# Patient Record
Sex: MALE | Race: WHITE | NOT HISPANIC OR LATINO | Employment: OTHER | URBAN - METROPOLITAN AREA
[De-identification: names, ages, dates, MRNs, and addresses within clinical notes are randomized per-mention and may not be internally consistent; named-entity substitution may affect disease eponyms.]

---

## 2017-03-29 ENCOUNTER — ALLSCRIPTS OFFICE VISIT (OUTPATIENT)
Dept: OTHER | Facility: OTHER | Age: 67
End: 2017-03-29

## 2017-03-29 DIAGNOSIS — E66.9 OBESITY: ICD-10-CM

## 2017-03-29 DIAGNOSIS — Z11.1 ENCOUNTER FOR SCREENING FOR RESPIRATORY TUBERCULOSIS: ICD-10-CM

## 2017-03-29 DIAGNOSIS — N52.9 MALE ERECTILE DYSFUNCTION: ICD-10-CM

## 2017-03-29 DIAGNOSIS — Z78.9 OTHER SPECIFIED HEALTH STATUS: ICD-10-CM

## 2017-03-29 DIAGNOSIS — R73.01 IMPAIRED FASTING GLUCOSE: ICD-10-CM

## 2017-03-29 DIAGNOSIS — Z13.29 ENCOUNTER FOR SCREENING FOR OTHER SUSPECTED ENDOCRINE DISORDER: ICD-10-CM

## 2017-04-03 ENCOUNTER — HOSPITAL ENCOUNTER (OUTPATIENT)
Dept: RADIOLOGY | Facility: HOSPITAL | Age: 67
Discharge: HOME | End: 2017-04-03
Payer: COMMERCIAL

## 2017-04-03 ENCOUNTER — TRANSCRIBE ORDERS (OUTPATIENT)
Dept: RADIOLOGY | Facility: HOSPITAL | Age: 67
End: 2017-04-03

## 2017-04-03 DIAGNOSIS — Z11.1 ENCOUNTER FOR SCREENING FOR RESPIRATORY TUBERCULOSIS: ICD-10-CM

## 2017-04-03 PROCEDURE — 71020 HB CHEST X-RAY 2VW FRONTAL&LATL: CPT

## 2017-04-04 ENCOUNTER — GENERIC CONVERSION - ENCOUNTER (OUTPATIENT)
Dept: OTHER | Facility: OTHER | Age: 67
End: 2017-04-04

## 2018-01-13 NOTE — PROGRESS NOTES
Assessment    1  Screening for tuberculosis (V74 1) (Z11 1)   2  Screening for hypothyroidism (V77 0) (Z13 29)   3  Hepatitis B vaccination status unknown (V49 89) (Z78 9)   4  Measles, mumps, rubella (MMR) vaccination status unknown (V49 89) (Z78 9)   5  Obesity (278 00) (E66 9)   6  Impacted cerumen of both ears (380 4) (H61 23)   7  Encounter for preventive health examination (V70 0) (Z00 00)    Plan  ED (erectile dysfunction), Obesity    · (1) LIPID PANEL FASTING W DIRECT LDL REFLEX; Status:Active; Requested  for:29Mar2017;   Hepatitis B vaccination status unknown    · (Q) HEPATITIS B SURFACE ANTIBODY QL; Status:Active; Requested for:29Mar2017;   Impacted cerumen of both ears    · Removal Impacted Cerumen Using Irrigation / Lavage one or both ears - POC;  Status:Complete;   Done: [id number]  Impaired fasting glucose    · (1) HEMOGLOBIN A1C; Status:Active; Requested for:29Mar2017;   Impaired fasting glucose, Obesity    · (1) CBC/PLT/DIFF; Status:Active; Requested for:29Mar2017;    · (1) COMPREHENSIVE METABOLIC PANEL; Status:Active; Requested for:29Mar2017;   Measles, mumps, rubella (MMR) vaccination status unknown    · (1) MUMPS  ANTIBODIES, IGG; Status:Active; Requested for:29Mar2017;    · (1) RUBELLA IMMUNITY; Status:Active; Requested for:29Mar2017;    · (Q) MEASLES ANTIBODY (IGG); Status:Active; Requested for:29Mar2017;   Obesity, Screening for hypothyroidism    · (1) TSH WITH FT4 REFLEX; Status:Active; Requested for:29Mar2017;   Screening for tuberculosis    · * XR CHEST PA & LATERAL; Status:Active; Requested for:29Mar2017;     Discussion/Summary  Impression: health maintenance visit  Currently, he eats a healthy diet and has an adequate exercise regimen  Prostate cancer screening: the risks and benefits of prostate cancer screening were discussed and PSA was ordered   Testicular cancer screening: the risks and benefits of testicular cancer screening were discussed, self testicular exam technique was taught and monthly self testicular exam was advised  Colorectal cancer screening: the risks and benefits of colorectal cancer screening were discussed, colorectal cancer screening is current and colorectal cancer screening is managed by Dr Adina Longo  Screening lab work includes glucose and lipid profile  The risks and benefits of immunizations were discussed and Declines flu vaccine  He was advised to be evaluated by an ophthalmologist  Advice and education were given regarding nutrition, aerobic exercise, weight bearing exercise, weight loss, sunscreen use, self skin examination and seat belt use  Patient discussion: discussed with the patient  Work physical forms partially completed today  B/L ear lavage performed in office today  No Qtips in inner ear canals  Advised to follow up with ophthalmologist regarding [facility] results today  Will get titers for Hepatitis B and MMR as we do no have vaccination records  Will get CXR to screen for TB as he reports a previous allergy to the TB skin antigen  Blood work (including PSA) as ordered  Declines the flu, Zostavax and Prevnar vaccines  UTD with colonoscopy (had 2/2014 with Dr Adina Longo, due 5 years from that time)  Possible side effects of new medications were reviewed with the patient/guardian today  The treatment plan was reviewed with the patient/guardian  The patient/guardian understands and agrees with the treatment plan      Chief Complaint  Patient here for employment physical       History of Present Illness  HM, Adult Male: The patient is being seen for a health maintenance evaluation  The last health maintenance visit was 2015  Social History: Household members include spouse  Work status: working part-time and occupation: [employer]   The patient has never smoked cigarettes  He reports never drinking alcohol  He has never used illicit drugs  General Health: The patient's health since the last visit is described as good  He has regular dental visits  The patient reports his last dental visit was <6 months  He denies vision problems  Vision care includes wearing reading glasses  He denies hearing loss  Immunizations status:  Declines flu vaccine  Lifestyle:  He consumes a diverse and healthy diet  He does not have any weight concerns  He exercises regularly  He exercises 7 times per week  He does not use tobacco  He denies alcohol use  He denies drug use  Reproductive health:  the patient is sexually active  birth control is not being practiced  He denies erectile dysfunction  Screening: Prostate cancer screening includes last prostate-specific antigen testing 2014  Testicular cancer screening includes monthly self testicular examinations  Colorectal cancer screening includes no previous screening and last colonoscopy done 2014  Metabolic screening includes lipid profile performed 2014, glucose screening performed 2014 and thyroid function test performed 2014  Cardiovascular risk factors: obesity and family history of cardiovascular disease, but no hypertension, no diabetes, no high LDL cholesterol, no low HDL cholesterol, no stress, no tobacco use, no illicit drug use and no sedentary lifestyle  General health risks: no elevated prostate-specific antigen, no undescended testis and no family history of prostate cancer  Safety elements used: seat belt, sunscreen and smoke detector  HPI: Pt presents by himself today for a work physical      No acute complaints today, feels well  Reports he has had an allergic reaction to the TB skin antigen in the past- will need the CXR  Blood work last performed in 2014  Exercises most days of the week  Review of Systems    Constitutional: no fever, not feeling poorly, no chills and not feeling tired  Eyes: no eyesight problems  ENT: no sore throat and no nasal discharge     Cardiovascular: no chest pain, no intermittent leg claudication, no palpitations and no extremity edema  Respiratory: no shortness of breath, no cough, no wheezing and no shortness of breath during exertion  Gastrointestinal: no abdominal pain, no nausea, no constipation, no diarrhea and no blood in stools  Genitourinary: no dysuria    The patient presents with complaints of nocturia (once/ night on some nights )  Musculoskeletal: no arthralgias and no myalgias  Integumentary: no rashes and no skin wound  Neurological: no headache, no numbness, no tingling and no dizziness  Psychiatric: no anxiety and no depression  Endocrine: erectile dysfunction, but no feelings of weakness  Hematologic/Lymphatic: no swollen glands  ROS reviewed  Active Problems    1  ED (erectile dysfunction) (607 84) (N52 9)   2  Glaucoma (365 9) (H40 9)   3  Impacted cerumen of both ears (380 4) (H61 23)   4  Impaired fasting glucose (790 21) (R73 01)   5  Obesity (278 00) (E66 9)   6  Special screening examination for neoplasm of prostate (V76 44) (Z12 5)    Past Medical History    · History of Asthma (493 90) (J45 909)   · History of Colon, diverticulosis (562 10) (K57 30)   · History of low back pain (V13 59) (Z87 39)   · History of Nonspecific reaction to 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 active tuberculosis (795 51)  (R76 11)   · History of Pneumonia (V12 61)   · History of Skin rash (782 1) (R21)    Surgical History    · History of Complete Colonoscopy   · History of Tonsillectomy    Family History  Mother    · Family history of Diabetes Mellitus (V18 0)   · Family history of coronary artery disease (V17 3) (Z80 55)  Father    · Family history of coronary artery disease (V17 3) (Z82 49)   · Family history of Stroke Syndrome (V17 1)    Social History    · Never A Smoker   · Never Drank Alcohol    Current Meds   1  Viagra 100 MG Oral Tablet; TAKE 1 TABLET 1 HOUR BEFORE ACTIVITY DAILY AS   NEEDED; Therapy: 12DDR1880 to (Evaluate:27Mar2017)  Requested for: 50CJQ4317; Last   Rx:26Jan2017 Ordered   2  Viagra 100 MG Oral Tablet; take 1 tablet 1 hour before activity daily as needed; Therapy: 21LWK8542 to (Evaluate:51Fyz0859)  Requested for: 27CBQ8286; Last   Rx:23Mar2016 Ordered    Allergies    1  No Known Drug Allergies    Vitals   Recorded: 21MUH8080 08:39AM   Temperature 98 1 F   Heart Rate 72   Respiration 16   Systolic 269   Diastolic 86   Height 5 ft 5 in   Weight 201 lb 0 80 oz   BMI Calculated 33 46   BSA Calculated 1 99   Pain Scale 0     Physical Exam    Constitutional   General appearance: No acute distress, well appearing and well nourished  Head and Face   Head and face: Normal     Palpation of the face and sinuses: No sinus tenderness  Eyes   Conjunctiva and lids: No erythema, swelling or discharge  Pupils and irises: Equal, round, reactive to light  Ophthalmoscopic examination: Normal fundi and optic discs  Ears, Nose, Mouth, and Throat   External inspection of ears and nose: Normal     Otoscopic examination: Abnormal   The right tympanic membrane was obscured completely by cerumen  The left tympanic membrane was obscured completely by cerumen  Hearing: Normal     Nasal mucosa, septum, and turbinates: Normal without edema or erythema  Lips, teeth, and gums: Normal, good dentition  Oropharynx: Normal with no erythema, edema, exudate or lesions  Neck   Neck: Supple, symmetric, trachea midline, no masses  Thyroid: Normal, no thyromegaly  Pulmonary   Respiratory effort: No increased work of breathing or signs of respiratory distress  Auscultation of lungs: Clear to auscultation  Cardiovascular   Auscultation of heart: Normal rate and rhythm, normal S1 and S2, no murmurs  Carotid pulses: 2+ bilaterally  Abdominal aorta: Normal     Examination of extremities for edema and/or varicosities: Normal     Abdomen   Abdomen: Non-tender, no masses  Liver and spleen: No hepatomegaly or splenomegaly  Examination for hernias: No hernias appreciated      Lymphatic Palpation of lymph nodes in neck: No lymphadenopathy  Musculoskeletal   Gait and station: Normal     Inspection/palpation of digits and nails: Normal without clubbing or cyanosis  Inspection/palpation of joints, bones, and muscles: Normal     Skin   Skin and subcutaneous tissue: Normal without rashes or lesions  Neurologic   Cranial nerves: Cranial nerves 2-12 intact  Psychiatric   Judgment and insight: Normal     Orientation to person, place and time: Normal     Mood and affect: Normal        Procedure    Procedure: cerumen removal    Indication: tympanic membrane(s) could not be visualized and cerumen impaction in both ears  Prep: hydrogen peroxide was placed in the canal prior to the procedure  Procedure Note: The procedure was performed by Maria Fernanda Dutta CMA  A otoscope was placed in the ear canal(s) to visualize the ear canal debris  The ear was cleaned by using warm water irrigation  The procedure was successful  Post-Procedure:   Patient Status: the patient tolerated the procedure well  Complications: there were no complications  Patient instructions: dry ear precautions and avoid using q-tips  Follow-up as needed  Procedure: Indication: routine screening  Inforrmation supplied by Maria Fernanda Dutta CMA a Snellen chart  Results: 20/40 in both eyes without corrective device, 20/50 in the right eye without corrective device, 20/40 in the left eye without corrective device      Attending Note  Collaborating Physician Note: Collaborating Physician: I did not interview and examine the patient and I agree with the Advanced Practitioner note  Signatures   Electronically signed by : Zhane Lacy; Mar 29 2017  9:22AM EST                       (Author)    Electronically signed by :  Jerica Gonzales MD; Mar 29 2017 12:18PM EST                       (Author)

## 2018-01-14 VITALS
DIASTOLIC BLOOD PRESSURE: 86 MMHG | BODY MASS INDEX: 33.49 KG/M2 | WEIGHT: 201.05 LBS | TEMPERATURE: 98.1 F | HEART RATE: 72 BPM | HEIGHT: 65 IN | SYSTOLIC BLOOD PRESSURE: 126 MMHG | RESPIRATION RATE: 16 BRPM

## 2018-01-14 NOTE — RESULT NOTES
Message   CXR normal   If he needs a copy for his work physical forms, we can mail a copy to him or he can pick it up  Verified Results  * XR CHEST PA & LATERAL [id number] 08:28AM Travis Miguel Order Number: [id number]     Test Name Result Flag Reference   XR CHEST PA & LATERAL (Report)     CHEST - DUAL ENERGY     INDICATION: 61-year-old male, positive PPD   COMPARISON: 7/18/2011 chest x-ray     VIEWS: PA (including soft tissue/bone algorithms) and lateral projections; 4 images     FINDINGS:     The lungs are clear  No pleural effusions  The cardiomediastinal silhouette is unremarkable  Bony thorax is unremarkable       Findings are stable       IMPRESSION:     No acute cardiopulmonary disease, stable        Workstation performed: [id number]     Signed by:   Aimee Saldaña MD   4/4/17

## 2018-04-23 DIAGNOSIS — N52.9 ERECTILE DYSFUNCTION, UNSPECIFIED ERECTILE DYSFUNCTION TYPE: Primary | ICD-10-CM

## 2018-04-23 RX ORDER — SILDENAFIL 100 MG/1
100 TABLET, FILM COATED ORAL AS NEEDED
Qty: 10 TABLET | Refills: 5 | Status: SHIPPED | OUTPATIENT
Start: 2018-04-23 | End: 2022-03-08 | Stop reason: SDUPTHER

## 2018-04-23 RX ORDER — SILDENAFIL 100 MG/1
1 TABLET, FILM COATED ORAL DAILY PRN
COMMUNITY
Start: 2014-07-09 | End: 2018-04-23 | Stop reason: SDUPTHER

## 2018-04-23 NOTE — TELEPHONE ENCOUNTER
Ade Pan,   I have not seen this patient for the last few years  You saw him on 2017  Please check if you can refill Rx for Viagra that patient requested  Thank you

## 2018-07-03 ENCOUNTER — OFFICE VISIT (OUTPATIENT)
Dept: FAMILY MEDICINE CLINIC | Facility: CLINIC | Age: 68
End: 2018-07-03
Payer: COMMERCIAL

## 2018-07-03 VITALS
HEART RATE: 64 BPM | BODY MASS INDEX: 36.06 KG/M2 | RESPIRATION RATE: 16 BRPM | DIASTOLIC BLOOD PRESSURE: 88 MMHG | WEIGHT: 216.4 LBS | SYSTOLIC BLOOD PRESSURE: 136 MMHG | TEMPERATURE: 97.4 F | HEIGHT: 65 IN

## 2018-07-03 DIAGNOSIS — Z00.00 WELL ADULT EXAM: Primary | ICD-10-CM

## 2018-07-03 DIAGNOSIS — Z23 NEED FOR PNEUMOCOCCAL VACCINATION: ICD-10-CM

## 2018-07-03 DIAGNOSIS — R73.01 IMPAIRED FASTING GLUCOSE: ICD-10-CM

## 2018-07-03 DIAGNOSIS — R01.0 MURMUR, FUNCTIONAL: ICD-10-CM

## 2018-07-03 DIAGNOSIS — E66.09 CLASS 2 OBESITY DUE TO EXCESS CALORIES WITHOUT SERIOUS COMORBIDITY WITH BODY MASS INDEX (BMI) OF 36.0 TO 36.9 IN ADULT: ICD-10-CM

## 2018-07-03 DIAGNOSIS — Z12.5 SCREENING FOR PROSTATE CANCER: ICD-10-CM

## 2018-07-03 DIAGNOSIS — E78.2 MIXED HYPERLIPIDEMIA: ICD-10-CM

## 2018-07-03 DIAGNOSIS — Z23 NEED FOR TDAP VACCINATION: ICD-10-CM

## 2018-07-03 PROCEDURE — 99397 PER PM REEVAL EST PAT 65+ YR: CPT | Performed by: NURSE PRACTITIONER

## 2018-07-03 PROCEDURE — 90471 IMMUNIZATION ADMIN: CPT

## 2018-07-03 PROCEDURE — 90715 TDAP VACCINE 7 YRS/> IM: CPT

## 2018-07-03 PROCEDURE — 90472 IMMUNIZATION ADMIN EACH ADD: CPT

## 2018-07-03 PROCEDURE — 90670 PCV13 VACCINE IM: CPT

## 2018-07-03 NOTE — PATIENT INSTRUCTIONS
Schedule Echo of your heart  Have labs done now  We will call you with these results  Work on regular exercise, follow a healthy diet  Colonoscopy due for repeat 2/2019 with Dr Nikki Mendoza

## 2018-07-03 NOTE — PROGRESS NOTES
Chief Complaint   Patient presents with    Physical Exam     Preventative physical exam      Health Maintenance   Topic Date Due    Hepatitis C Screening  1950    CRC Screening: Colonoscopy  1950    Fall Risk  03/14/2017    PNEUMOCOCCAL POLYSACCHARIDE VACCINE AGE 65 AND OVER  03/14/2017    DTaP,Tdap,and Td Vaccines (2 - Td) 01/29/2018    INFLUENZA VACCINE  06/01/2018    Depression Screening PHQ-9  07/03/2019     Assessment/Plan:     Diagnoses and all orders for this visit:    Well adult exam    Impaired fasting glucose  -     CBC and differential; Future  -     Comprehensive metabolic panel; Future  -     Hemoglobin A1C; Future  -     TSH, 3rd generation with Free T4 reflex; Future    Class 2 obesity due to excess calories without serious comorbidity with body mass index (BMI) of 36 0 to 36 9 in adult  -     CBC and differential; Future  -     Comprehensive metabolic panel; Future  -     Hemoglobin A1C; Future  -     Lipid panel; Future  -     TSH, 3rd generation with Free T4 reflex; Future    Mixed hyperlipidemia  -     CBC and differential; Future  -     Comprehensive metabolic panel; Future  -     Lipid panel; Future  -     TSH, 3rd generation with Free T4 reflex; Future    Murmur, functional  -     Echo complete with contrast if indicated; Future  -     CBC and differential; Future  -     Comprehensive metabolic panel; Future  -     TSH, 3rd generation with Free T4 reflex; Future    Need for Tdap vaccination  -     TDAP VACCINE GREATER THAN OR EQUAL TO 8YO IM    Need for pneumococcal vaccination  -     PNEUMOCOCCAL CONJUGATE VACCINE 13-VALENT GREATER THAN 6 MONTHS    Screening for prostate cancer  -     PSA, total and free; Future          Subjective:      Patient ID: Brenda Fam is a 79 y o  male  Assessment/Plan    Healthy male exam     1  HM- PSA and routine labs ordered today  Colonoscopy due for repeat 2/2019 with Dr Kalyan Larson and Prevnar administered today    He will check with his insurance regarding the coverage of Shingrix    2  Systolic murmur- new finding  He is asymptomatic but given this is new, we'll check an echo to r/o valvular abnormalities     3  IFG/ Obesity- work on regular exercise 30 minutes 5 x per week, work on weight loss    4  Hyperlipidemia- lipid panel ordered  Follow a low fat/ low cholesterol diet, work on weight loss     5  Patient Counseling:  --Nutrition: Stressed importance of moderation in sodium/caffeine intake, saturated fat and cholesterol, caloric balance, sufficient intake of fresh fruits, vegetables, fiber, calcium, iron, and 1 mg of folate supplement per day (for females capable of pregnancy)  --Discussed the issue of estrogen replacement, calcium supplement, and the daily use of baby aspirin  --Exercise: Stressed the importance of regular exercise  --Substance Abuse: Discussed cessation/primary prevention of tobacco, alcohol, or other drug use; driving or other dangerous activities under the influence; availability of treatment for abuse  --Sexuality: Discussed sexually transmitted diseases, partner selection, use of condoms, avoidance of unintended pregnancy  and contraceptive alternatives  --Injury prevention: Discussed safety belts, safety helmets, smoke detector, smoking near bedding or upholstery  --Dental health: Discussed importance of regular tooth brushing, flossing, and dental visits  --Immunizations reviewed  --Discussed benefits of screening colonoscopy  --After hours service discussed with patient    6  Discussed the patient's BMI with him  The BMI is above average; BMI management plan is completed    7  Follow up in 6 month  Subjective    Leonor Arreola is a 79 y o  male and is here for a comprehensive physical exam  The patient reports no problems    No lab work since 7/2014  PSA at that time 1 9  Lipid panel with / / TG 34/ HDL 55  IFG-     He recently took on more responsibilities are work and started some other side jobs as well- hasn't been exercising as much as he'd like but has been working on this     Colonoscopy done 2/2014 with Dr Demetria Lutz, due for repeat 2/2019    Do you take any herbs or supplements that were not prescribed by a doctor? no  Are you taking calcium supplements? no  Are you taking aspirin daily? No    The following portions of the patient's history were reviewed and updated as appropriate: allergies, current medications, past medical history, past social history and problem list     Review of Systems  Do you have pain that bothers you in your daily life? no  Constitutional: negative  Eyes: negative  Ears, nose, mouth, throat, and face: negative  Respiratory: negative  Cardiovascular: negative  Gastrointestinal: negative  Genitourinary:negative  Hematologic/lymphatic: negative  Musculoskeletal:negative  Neurological: negative  Behavioral/Psych: negative  Endocrine: negative  Objective    General appearance: alert and oriented, in no acute distress  Head: Normocephalic, without obvious abnormality, atraumatic  Eyes: conjunctivae/corneas clear  PERRL, EOM's intact  Fundi benign  Ears: normal TM's and external ear canals both ears  Nose: Nares normal  Septum midline  Mucosa normal  No drainage or sinus tenderness  Throat: lips, mucosa, and tongue normal; teeth and gums normal  Neck: no adenopathy, no carotid bruit, no JVD, supple, symmetrical, trachea midline and thyroid not enlarged, symmetric, no tenderness/mass/nodules  Back: symmetric, no curvature  ROM normal  No CVA tenderness    Lungs: clear to auscultation bilaterally  Heart: regular rate and rhythm, S1, S2 normal and systolic INMOFV:3/2 LUSB  Abdomen: soft, non-tender; bowel sounds normal; no masses,  no organomegaly  Extremities: extremities normal, warm and well-perfused; no cyanosis, clubbing, or edema  Skin: Skin color, texture, turgor normal  No rashes or lesions  Lymph nodes: Cervical, supraclavicular, and axillary nodes normal   Neurologic: Grossly normal             The following portions of the patient's history were reviewed and updated as appropriate: allergies, current medications, past medical history, past social history and problem list     Review of Systems  see above    Objective:    /88 (BP Location: Left arm, Patient Position: Sitting)   Pulse 64   Temp (!) 97 4 °F (36 3 °C) (Tympanic)   Resp 16   Ht 5' 5" (1 651 m)   Wt 98 2 kg (216 lb 6 4 oz)   BMI 36 01 kg/m²      Physical Exam  see above

## 2018-07-06 ENCOUNTER — LAB (OUTPATIENT)
Dept: LAB | Facility: HOSPITAL | Age: 68
End: 2018-07-06
Payer: COMMERCIAL

## 2018-07-06 DIAGNOSIS — R73.01 IMPAIRED FASTING GLUCOSE: ICD-10-CM

## 2018-07-06 DIAGNOSIS — Z12.5 SCREENING FOR PROSTATE CANCER: ICD-10-CM

## 2018-07-06 DIAGNOSIS — R01.0 MURMUR, FUNCTIONAL: ICD-10-CM

## 2018-07-06 DIAGNOSIS — E66.09 CLASS 2 OBESITY DUE TO EXCESS CALORIES WITHOUT SERIOUS COMORBIDITY WITH BODY MASS INDEX (BMI) OF 36.0 TO 36.9 IN ADULT: ICD-10-CM

## 2018-07-06 DIAGNOSIS — E78.2 MIXED HYPERLIPIDEMIA: ICD-10-CM

## 2018-07-06 LAB
ALBUMIN SERPL BCP-MCNC: 3.5 G/DL (ref 3.5–5)
ALP SERPL-CCNC: 70 U/L (ref 46–116)
ALT SERPL W P-5'-P-CCNC: 26 U/L (ref 12–78)
ANION GAP SERPL CALCULATED.3IONS-SCNC: 6 MMOL/L (ref 4–13)
AST SERPL W P-5'-P-CCNC: 17 U/L (ref 5–45)
BASOPHILS # BLD AUTO: 0.04 THOUSANDS/ΜL (ref 0–0.1)
BASOPHILS NFR BLD AUTO: 1 % (ref 0–1)
BILIRUB SERPL-MCNC: 0.47 MG/DL (ref 0.2–1)
BUN SERPL-MCNC: 22 MG/DL (ref 5–25)
CALCIUM SERPL-MCNC: 8.7 MG/DL (ref 8.3–10.1)
CHLORIDE SERPL-SCNC: 108 MMOL/L (ref 100–108)
CHOLEST SERPL-MCNC: 164 MG/DL (ref 50–200)
CO2 SERPL-SCNC: 24 MMOL/L (ref 21–32)
CREAT SERPL-MCNC: 1.23 MG/DL (ref 0.6–1.3)
EOSINOPHIL # BLD AUTO: 0.22 THOUSAND/ΜL (ref 0–0.61)
EOSINOPHIL NFR BLD AUTO: 3 % (ref 0–6)
ERYTHROCYTE [DISTWIDTH] IN BLOOD BY AUTOMATED COUNT: 12.3 % (ref 11.6–15.1)
EST. AVERAGE GLUCOSE BLD GHB EST-MCNC: 111 MG/DL
GFR SERPL CREATININE-BSD FRML MDRD: 61 ML/MIN/1.73SQ M
GLUCOSE P FAST SERPL-MCNC: 111 MG/DL (ref 65–99)
HBA1C MFR BLD: 5.5 % (ref 4.2–6.3)
HCT VFR BLD AUTO: 44.4 % (ref 36.5–49.3)
HDLC SERPL-MCNC: 44 MG/DL (ref 40–60)
HGB BLD-MCNC: 14.9 G/DL (ref 12–17)
IMM GRANULOCYTES # BLD AUTO: 0.01 THOUSAND/UL (ref 0–0.2)
IMM GRANULOCYTES NFR BLD AUTO: 0 % (ref 0–2)
LDLC SERPL CALC-MCNC: 110 MG/DL (ref 0–100)
LYMPHOCYTES # BLD AUTO: 2.06 THOUSANDS/ΜL (ref 0.6–4.47)
LYMPHOCYTES NFR BLD AUTO: 31 % (ref 14–44)
MCH RBC QN AUTO: 32.2 PG (ref 26.8–34.3)
MCHC RBC AUTO-ENTMCNC: 33.6 G/DL (ref 31.4–37.4)
MCV RBC AUTO: 96 FL (ref 82–98)
MONOCYTES # BLD AUTO: 0.67 THOUSAND/ΜL (ref 0.17–1.22)
MONOCYTES NFR BLD AUTO: 10 % (ref 4–12)
NEUTROPHILS # BLD AUTO: 3.69 THOUSANDS/ΜL (ref 1.85–7.62)
NEUTS SEG NFR BLD AUTO: 55 % (ref 43–75)
NONHDLC SERPL-MCNC: 120 MG/DL
NRBC BLD AUTO-RTO: 0 /100 WBCS
PLATELET # BLD AUTO: 189 THOUSANDS/UL (ref 149–390)
PMV BLD AUTO: 11 FL (ref 8.9–12.7)
POTASSIUM SERPL-SCNC: 4.3 MMOL/L (ref 3.5–5.3)
PROT SERPL-MCNC: 7 G/DL (ref 6.4–8.2)
RBC # BLD AUTO: 4.63 MILLION/UL (ref 3.88–5.62)
SODIUM SERPL-SCNC: 138 MMOL/L (ref 136–145)
TRIGL SERPL-MCNC: 51 MG/DL
TSH SERPL DL<=0.05 MIU/L-ACNC: 3.63 UIU/ML (ref 0.36–3.74)
WBC # BLD AUTO: 6.69 THOUSAND/UL (ref 4.31–10.16)

## 2018-07-06 PROCEDURE — 84154 ASSAY OF PSA FREE: CPT

## 2018-07-06 PROCEDURE — 80061 LIPID PANEL: CPT

## 2018-07-06 PROCEDURE — 84153 ASSAY OF PSA TOTAL: CPT

## 2018-07-06 PROCEDURE — 83036 HEMOGLOBIN GLYCOSYLATED A1C: CPT

## 2018-07-06 PROCEDURE — 85025 COMPLETE CBC W/AUTO DIFF WBC: CPT

## 2018-07-06 PROCEDURE — 80053 COMPREHEN METABOLIC PANEL: CPT

## 2018-07-06 PROCEDURE — 36415 COLL VENOUS BLD VENIPUNCTURE: CPT

## 2018-07-06 PROCEDURE — 84443 ASSAY THYROID STIM HORMONE: CPT

## 2018-07-07 LAB
PSA FREE MFR SERPL: 25.8 %
PSA FREE SERPL-MCNC: 0.98 NG/ML
PSA SERPL-MCNC: 3.8 NG/ML (ref 0–4)

## 2018-07-09 ENCOUNTER — TELEPHONE (OUTPATIENT)
Dept: FAMILY MEDICINE CLINIC | Facility: CLINIC | Age: 68
End: 2018-07-09

## 2018-07-09 DIAGNOSIS — E66.09 CLASS 2 OBESITY DUE TO EXCESS CALORIES WITHOUT SERIOUS COMORBIDITY WITH BODY MASS INDEX (BMI) OF 36.0 TO 36.9 IN ADULT: ICD-10-CM

## 2018-07-09 DIAGNOSIS — R73.01 IMPAIRED FASTING GLUCOSE: Primary | ICD-10-CM

## 2018-07-09 DIAGNOSIS — R97.20 ELEVATED PSA MEASUREMENT: ICD-10-CM

## 2018-07-09 DIAGNOSIS — E78.2 MIXED HYPERLIPIDEMIA: ICD-10-CM

## 2018-07-09 NOTE — TELEPHONE ENCOUNTER
Patient spoke to his insurance company and they require a letter from his provider stating that it is recommended that he receive a shingles shot to have it covered by insurance  Patient can be contacted at [phone] with any questions

## 2018-07-27 ENCOUNTER — HOSPITAL ENCOUNTER (OUTPATIENT)
Dept: NON INVASIVE DIAGNOSTICS | Facility: HOSPITAL | Age: 68
Discharge: HOME | End: 2018-07-27
Payer: COMMERCIAL

## 2018-07-27 ENCOUNTER — TRANSCRIBE ORDERS (OUTPATIENT)
Dept: ADMISSIONS | Facility: HOSPITAL | Age: 68
End: 2018-07-27

## 2018-07-27 DIAGNOSIS — R01.0 MURMUR, FUNCTIONAL: ICD-10-CM

## 2018-07-27 DIAGNOSIS — I35.0 SEVERE AORTIC STENOSIS: Primary | ICD-10-CM

## 2018-07-27 PROCEDURE — 93306 TTE W/DOPPLER COMPLETE: CPT

## 2018-07-27 PROCEDURE — 93306 TTE W/DOPPLER COMPLETE: CPT | Performed by: INTERNAL MEDICINE

## 2018-08-30 ENCOUNTER — OFFICE VISIT (OUTPATIENT)
Dept: CARDIOLOGY CLINIC | Facility: CLINIC | Age: 68
End: 2018-08-30
Payer: MEDICARE

## 2018-08-30 VITALS
DIASTOLIC BLOOD PRESSURE: 78 MMHG | HEART RATE: 60 BPM | SYSTOLIC BLOOD PRESSURE: 124 MMHG | HEIGHT: 67 IN | BODY MASS INDEX: 33.57 KG/M2 | WEIGHT: 213.9 LBS

## 2018-08-30 DIAGNOSIS — I35.0 SEVERE AORTIC STENOSIS: ICD-10-CM

## 2018-08-30 DIAGNOSIS — E66.09 CLASS 2 OBESITY DUE TO EXCESS CALORIES WITHOUT SERIOUS COMORBIDITY WITH BODY MASS INDEX (BMI) OF 36.0 TO 36.9 IN ADULT: Primary | ICD-10-CM

## 2018-08-30 DIAGNOSIS — E78.2 MIXED HYPERLIPIDEMIA: ICD-10-CM

## 2018-08-30 PROCEDURE — 99204 OFFICE O/P NEW MOD 45 MIN: CPT | Performed by: INTERNAL MEDICINE

## 2018-08-30 PROCEDURE — 93000 ELECTROCARDIOGRAM COMPLETE: CPT | Performed by: INTERNAL MEDICINE

## 2018-08-30 NOTE — PROGRESS NOTES
Cardiology Consultation     To Myrick  [id number]  1950  HEART & VASCULAR [facility] [facility]    I had the pleasure of seeing To Myrick for a consultation regarding aortic stenosis requested by Jemma Gagnon    History of the Presenting Illness, Discussion/Summary and My Plan are as follows:    He is a very pleasant 53-year-old gentleman who presents for evaluation of aortic stenosis by an echocardiogram from July 2018  He does not have a history of hypertension, diabetes or dyslipidemia  LDL is 110  No family history of premature coronary artery disease  His dad had a heart attack at the age of 72  For evaluation of her murmur, underwent a recent echocardiogram that reported ' severe aortic stenosis', I reviewed his images myself and did the measurements also  (peak gradient-66, mean gradient -34, VTI Ix:  28/99= 0 28; LVOT diameter 2 4, valve area -1 2), also has mild aortic insufficiency    He has been feeling more short of breath than his baseline for the last couple of months although this is only sporadic  When he exercises he does not have any symptoms, sometimes when he is playing with his grand kids he feels more short of breath  He also admits to putting on about 20 lb of weight in the last 1 year and attributes this shortness of breath to that  He denies any chest pains, presyncope or syncope  He remains mentally and physically active  Plan: Aortic stenosis:  Along with mild aortic insufficiency, At this point appears in the moderate range, see measurements above, patient is relatively asymptomatic    I discussed with him-the cardinal symptoms to watch out for-CHEST PAIN, SHORTNESS OF BREATH, LIGHTHEADEDNESS OR PASSING OUT AND INCREASING FATIGUE  He will seek attention for any of these symptoms  Recheck an echo in 6 months    Blood pressure is well controlled    Lipid profile shows a total cholesterol 164, triglycerides 51, HDL 44,     Follow-up in 6 months    1  Class 2 obesity due to excess calories without serious comorbidity with body mass index (BMI) of 36 0 to 36 9 in adult     2  Severe aortic stenosis  Ambulatory referral to Cardiology    POCT ECG    Echo complete with contrast if indicated   3  Mixed hyperlipidemia       Patient Active Problem List   Diagnosis    ED (erectile dysfunction)    Impaired fasting glucose    Obesity    Mixed hyperlipidemia    Elevated PSA measurement    Severe aortic stenosis     Past Medical History:   Diagnosis Date    Asthma     Colon, diverticulosis     Nonspecific reaction to 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 active tuberculosis     CXR HAS BEEN CLEAR   Pneumonia      Social History     Social History    Marital status: /Civil Union     Spouse name: N/A    Number of children: N/A    Years of education: N/A     Occupational History    Not on file  Social History Main Topics    Smoking status: Never Smoker    Smokeless tobacco: Never Used    Alcohol use No    Drug use: No    Sexual activity: Not on file     Other Topics Concern    Not on file     Social History Narrative    No narrative on file      Family History   Problem Relation Age of Onset    Diabetes Mother         MELLITUS    Coronary artery disease Mother     Coronary artery disease Father     Stroke Father         SYNDROME     Past Surgical History:   Procedure Laterality Date    COLONOSCOPY  11/2007    COMPLETE; DONE 11/2007 WITH NO POLYPS, +DIVERTICULA   2/14    TONSILLECTOMY      LAST ASSESSED: 7/9/14       Current Outpatient Prescriptions:     sildenafil (VIAGRA) 100 mg tablet, Take 1 tablet (100 mg total) by mouth as needed for erectile dysfunction, Disp: 10 tablet, Rfl: 5  Allergies   Allergen Reactions    Tuberculin Rash     Vitals:    08/30/18 0802   BP: 124/78   BP Location: Left arm   Patient Position: Sitting   Cuff Size: Large   Pulse: 60   Weight: 97 kg (213 lb 14 4 oz)   Height: 5' 7" (1 702 m)       Labs:not applicable    Imaging: No results found  Review of Systems:  Review of Systems   Constitutional: Negative  HENT: Negative  Eyes: Negative  Respiratory: Positive for shortness of breath  Negative for apnea, cough, choking, chest tightness, wheezing and stridor  Cardiovascular: Negative  Gastrointestinal: Negative  Endocrine: Negative  Genitourinary: Negative  Musculoskeletal: Negative  Skin: Negative  Allergic/Immunologic: Negative  Neurological: Negative  Hematological: Negative  Psychiatric/Behavioral: Negative  Physical Exam:  Physical Exam   Constitutional: He appears well-developed and well-nourished  No distress  HENT:   Head: Normocephalic and atraumatic  Eyes: Pupils are equal, round, and reactive to light  Right eye exhibits no discharge  Left eye exhibits no discharge  Neck: Normal range of motion  No JVD present  No tracheal deviation present  No thyromegaly present  Cardiovascular: Normal rate and intact distal pulses  Exam reveals no friction rub  Murmur heard  Pulmonary/Chest: Effort normal  No stridor  No respiratory distress  He has no wheezes  He has no rales  Abdominal: Soft  He exhibits no distension  There is no tenderness  There is no rebound  Musculoskeletal: Normal range of motion  He exhibits no edema or deformity  Skin: Skin is warm  No rash noted  He is not diaphoretic  No erythema  No pallor

## 2019-05-06 ENCOUNTER — TELEPHONE (OUTPATIENT)
Dept: FAMILY MEDICINE CLINIC | Facility: CLINIC | Age: 69
End: 2019-05-06

## 2021-02-13 DIAGNOSIS — Z23 ENCOUNTER FOR IMMUNIZATION: ICD-10-CM

## 2021-10-25 PROBLEM — E78.5 DYSLIPIDEMIA: Status: ACTIVE | Noted: 2018-07-03

## 2021-10-25 PROBLEM — Z00.00 WELCOME TO MEDICARE PREVENTIVE VISIT: Status: ACTIVE | Noted: 2021-10-25

## 2021-10-27 ENCOUNTER — OFFICE VISIT (OUTPATIENT)
Dept: FAMILY MEDICINE CLINIC | Facility: CLINIC | Age: 71
End: 2021-10-27
Payer: MEDICARE

## 2021-10-27 VITALS
HEART RATE: 74 BPM | WEIGHT: 212 LBS | HEIGHT: 66 IN | SYSTOLIC BLOOD PRESSURE: 124 MMHG | BODY MASS INDEX: 34.07 KG/M2 | RESPIRATION RATE: 14 BRPM | TEMPERATURE: 98.4 F | DIASTOLIC BLOOD PRESSURE: 82 MMHG | OXYGEN SATURATION: 98 %

## 2021-10-27 DIAGNOSIS — Z12.5 SCREENING FOR PROSTATE CANCER: ICD-10-CM

## 2021-10-27 DIAGNOSIS — I35.0 SEVERE AORTIC STENOSIS: ICD-10-CM

## 2021-10-27 DIAGNOSIS — Z00.00 WELCOME TO MEDICARE PREVENTIVE VISIT: Primary | ICD-10-CM

## 2021-10-27 DIAGNOSIS — Z12.11 SCREENING FOR COLON CANCER: ICD-10-CM

## 2021-10-27 DIAGNOSIS — E66.09 CLASS 2 OBESITY DUE TO EXCESS CALORIES WITHOUT SERIOUS COMORBIDITY WITH BODY MASS INDEX (BMI) OF 36.0 TO 36.9 IN ADULT: ICD-10-CM

## 2021-10-27 DIAGNOSIS — Z23 NEED FOR PNEUMOCOCCAL VACCINATION: ICD-10-CM

## 2021-10-27 DIAGNOSIS — R73.01 IMPAIRED FASTING GLUCOSE: ICD-10-CM

## 2021-10-27 DIAGNOSIS — E78.5 DYSLIPIDEMIA: ICD-10-CM

## 2021-10-27 PROCEDURE — G0009 ADMIN PNEUMOCOCCAL VACCINE: HCPCS

## 2021-10-27 PROCEDURE — 1123F ACP DISCUSS/DSCN MKR DOCD: CPT | Performed by: FAMILY MEDICINE

## 2021-10-27 PROCEDURE — G0402 INITIAL PREVENTIVE EXAM: HCPCS | Performed by: FAMILY MEDICINE

## 2021-10-27 PROCEDURE — 90732 PPSV23 VACC 2 YRS+ SUBQ/IM: CPT

## 2021-10-27 PROCEDURE — G0403 EKG FOR INITIAL PREVENT EXAM: HCPCS | Performed by: FAMILY MEDICINE

## 2021-10-31 DIAGNOSIS — L30.9 DERMATITIS: Primary | ICD-10-CM

## 2021-10-31 RX ORDER — DESOXIMETASONE 2.5 MG/G
CREAM TOPICAL 2 TIMES DAILY
Qty: 60 G | Refills: 1 | Status: SHIPPED | OUTPATIENT
Start: 2021-10-31

## 2021-11-03 ENCOUNTER — APPOINTMENT (OUTPATIENT)
Dept: LAB | Facility: HOSPITAL | Age: 71
End: 2021-11-03
Payer: MEDICARE

## 2021-11-03 DIAGNOSIS — R73.01 IMPAIRED FASTING GLUCOSE: ICD-10-CM

## 2021-11-03 DIAGNOSIS — Z12.5 SCREENING FOR PROSTATE CANCER: ICD-10-CM

## 2021-11-03 DIAGNOSIS — R97.20 ELEVATED PSA: Primary | ICD-10-CM

## 2021-11-03 DIAGNOSIS — E78.5 DYSLIPIDEMIA: ICD-10-CM

## 2021-11-03 DIAGNOSIS — E66.09 CLASS 2 OBESITY DUE TO EXCESS CALORIES WITHOUT SERIOUS COMORBIDITY WITH BODY MASS INDEX (BMI) OF 36.0 TO 36.9 IN ADULT: ICD-10-CM

## 2021-11-03 DIAGNOSIS — I35.0 SEVERE AORTIC STENOSIS: ICD-10-CM

## 2021-11-03 LAB
ALBUMIN SERPL BCP-MCNC: 3.6 G/DL (ref 3.5–5)
ALP SERPL-CCNC: 78 U/L (ref 46–116)
ALT SERPL W P-5'-P-CCNC: 36 U/L (ref 12–78)
ANION GAP SERPL CALCULATED.3IONS-SCNC: 2 MMOL/L (ref 4–13)
AST SERPL W P-5'-P-CCNC: 22 U/L (ref 5–45)
BASOPHILS # BLD AUTO: 0.05 THOUSANDS/ΜL (ref 0–0.1)
BASOPHILS NFR BLD AUTO: 1 % (ref 0–1)
BILIRUB SERPL-MCNC: 0.61 MG/DL (ref 0.2–1)
BUN SERPL-MCNC: 17 MG/DL (ref 5–25)
CALCIUM SERPL-MCNC: 8.9 MG/DL (ref 8.3–10.1)
CHLORIDE SERPL-SCNC: 106 MMOL/L (ref 100–108)
CHOLEST SERPL-MCNC: 179 MG/DL (ref 50–200)
CO2 SERPL-SCNC: 27 MMOL/L (ref 21–32)
CREAT SERPL-MCNC: 1.15 MG/DL (ref 0.6–1.3)
EOSINOPHIL # BLD AUTO: 0.11 THOUSAND/ΜL (ref 0–0.61)
EOSINOPHIL NFR BLD AUTO: 2 % (ref 0–6)
ERYTHROCYTE [DISTWIDTH] IN BLOOD BY AUTOMATED COUNT: 12.2 % (ref 11.6–15.1)
EST. AVERAGE GLUCOSE BLD GHB EST-MCNC: 105 MG/DL
GFR SERPL CREATININE-BSD FRML MDRD: 65 ML/MIN/1.73SQ M
GLUCOSE P FAST SERPL-MCNC: 133 MG/DL (ref 65–99)
HBA1C MFR BLD: 5.3 %
HCT VFR BLD AUTO: 45 % (ref 36.5–49.3)
HDLC SERPL-MCNC: 50 MG/DL
HGB BLD-MCNC: 15.1 G/DL (ref 12–17)
IMM GRANULOCYTES # BLD AUTO: 0.01 THOUSAND/UL (ref 0–0.2)
IMM GRANULOCYTES NFR BLD AUTO: 0 % (ref 0–2)
LDLC SERPL CALC-MCNC: 114 MG/DL (ref 0–100)
LYMPHOCYTES # BLD AUTO: 2.31 THOUSANDS/ΜL (ref 0.6–4.47)
LYMPHOCYTES NFR BLD AUTO: 36 % (ref 14–44)
MCH RBC QN AUTO: 32.3 PG (ref 26.8–34.3)
MCHC RBC AUTO-ENTMCNC: 33.6 G/DL (ref 31.4–37.4)
MCV RBC AUTO: 96 FL (ref 82–98)
MONOCYTES # BLD AUTO: 0.61 THOUSAND/ΜL (ref 0.17–1.22)
MONOCYTES NFR BLD AUTO: 10 % (ref 4–12)
NEUTROPHILS # BLD AUTO: 3.32 THOUSANDS/ΜL (ref 1.85–7.62)
NEUTS SEG NFR BLD AUTO: 51 % (ref 43–75)
NONHDLC SERPL-MCNC: 129 MG/DL
NRBC BLD AUTO-RTO: 0 /100 WBCS
PLATELET # BLD AUTO: 186 THOUSANDS/UL (ref 149–390)
PMV BLD AUTO: 10.6 FL (ref 8.9–12.7)
POTASSIUM SERPL-SCNC: 4 MMOL/L (ref 3.5–5.3)
PROT SERPL-MCNC: 7.3 G/DL (ref 6.4–8.2)
PSA SERPL-MCNC: 4.7 NG/ML (ref 0–4)
RBC # BLD AUTO: 4.68 MILLION/UL (ref 3.88–5.62)
SODIUM SERPL-SCNC: 135 MMOL/L (ref 136–145)
TRIGL SERPL-MCNC: 77 MG/DL
WBC # BLD AUTO: 6.41 THOUSAND/UL (ref 4.31–10.16)

## 2021-11-03 PROCEDURE — 83036 HEMOGLOBIN GLYCOSYLATED A1C: CPT

## 2021-11-03 PROCEDURE — 85025 COMPLETE CBC W/AUTO DIFF WBC: CPT

## 2021-11-03 PROCEDURE — G0103 PSA SCREENING: HCPCS

## 2021-11-03 PROCEDURE — 36415 COLL VENOUS BLD VENIPUNCTURE: CPT

## 2021-11-03 PROCEDURE — 80061 LIPID PANEL: CPT

## 2021-11-03 PROCEDURE — 80053 COMPREHEN METABOLIC PANEL: CPT

## 2021-11-11 ENCOUNTER — TELEPHONE (OUTPATIENT)
Dept: UROLOGY | Facility: AMBULATORY SURGERY CENTER | Age: 71
End: 2021-11-11

## 2021-12-07 ENCOUNTER — HOSPITAL ENCOUNTER (OUTPATIENT)
Dept: NON INVASIVE DIAGNOSTICS | Facility: CLINIC | Age: 71
Discharge: HOME | End: 2021-12-07
Payer: MEDICARE

## 2021-12-07 VITALS
DIASTOLIC BLOOD PRESSURE: 82 MMHG | BODY MASS INDEX: 34.07 KG/M2 | SYSTOLIC BLOOD PRESSURE: 124 MMHG | WEIGHT: 212 LBS | HEIGHT: 66 IN | HEART RATE: 65 BPM

## 2021-12-07 DIAGNOSIS — I35.0 SEVERE AORTIC STENOSIS: ICD-10-CM

## 2021-12-07 LAB
AORTIC ROOT: 3.4 CM
AORTIC VALVE MEAN VELOCITY: 27.1 M/S
APICAL FOUR CHAMBER EJECTION FRACTION: 60 %
AV AREA BY CONTINUOUS VTI: 1.3 CM2
AV AREA PEAK VELOCITY: 1.2 CM2
AV LVOT MEAN GRADIENT: 2 MMHG
AV LVOT PEAK GRADIENT: 3 MMHG
AV MEAN GRADIENT: 32 MMHG
AV PEAK GRADIENT: 55 MMHG
AV VALVE AREA: 1.15 CM2
AV VELOCITY RATIO: 0.27
DOP CALC AO PEAK VEL: 3.7 M/S
DOP CALC AO VTI: 90 CM
DOP CALC LVOT AREA: 4.15 CM2
DOP CALC LVOT DIAMETER: 2.3 CM
DOP CALC LVOT PEAK VEL VTI: 25 CM
DOP CALC LVOT PEAK VEL: 1 M/S
DOP CALC LVOT STROKE INDEX: 53.4 ML/M2
DOP CALC LVOT STROKE VOLUME: 103.82 CM3
E WAVE DECELERATION TIME: 253 MS
FRACTIONAL SHORTENING: 38 % (ref 28–44)
INTERVENTRICULAR SEPTUM IN DIASTOLE (PARASTERNAL SHORT AXIS VIEW): 1 CM
LEFT ATRIUM AREA SYSTOLE SINGLE PLANE A4C: 20.4 CM2
LEFT INTERNAL DIMENSION IN SYSTOLE: 3.1 CM (ref 2.1–4)
LEFT VENTRICULAR INTERNAL DIMENSION IN DIASTOLE: 5 CM (ref 5.72–8.52)
LEFT VENTRICULAR POSTERIOR WALL IN END DIASTOLE: 1 CM
LEFT VENTRICULAR STROKE VOLUME: 80 ML
LVOT STOKE VOLUME INDEX: 43 ML/M2
MV E'TISSUE VEL-SEP: 6 CM/S
MV PEAK A VEL: 0.81 M/S
MV PEAK E VEL: 45 CM/S
MV STENOSIS PRESSURE HALF TIME: 0 MS
RIGHT ATRIUM AREA SYSTOLE A4C: 12.4 CM2
RIGHT VENTRICLE ID DIMENSION: 3.4 CM
SL CV ECHO AV MEAN VELOCITY RETROGRADE: 2.6 M/S
SL CV LV EF: 60
SL CV PED ECHO LEFT VENTRICLE DIASTOLIC VOLUME (MOD BIPLANE) 2D: 120 ML
SL CV PED ECHO LEFT VENTRICLE SYSTOLIC VOLUME (MOD BIPLANE) 2D: 39 ML
TRICUSPID VALVE S': 0.9 CM/S
Z-SCORE OF LEFT VENTRICULAR DIMENSION IN END SYSTOLE: -3.33

## 2021-12-07 PROCEDURE — 93306 TTE W/DOPPLER COMPLETE: CPT

## 2021-12-07 PROCEDURE — 93306 TTE W/DOPPLER COMPLETE: CPT | Performed by: INTERNAL MEDICINE

## 2022-05-25 ENCOUNTER — TELEPHONE (OUTPATIENT)
Dept: FAMILY MEDICINE CLINIC | Facility: CLINIC | Age: 72
End: 2022-05-25

## 2022-05-25 DIAGNOSIS — R97.20 ELEVATED PSA MEASUREMENT: Primary | ICD-10-CM

## 2022-05-25 NOTE — TELEPHONE ENCOUNTER
Patient has an appointment 7/1/22 with Dr Jacklyn Velasquez and needs an updated script for a PSA so he will be ready for his urology appointment  Please call patient at [phone] when the script has been placed

## 2022-06-27 ENCOUNTER — APPOINTMENT (OUTPATIENT)
Dept: LAB | Facility: HOSPITAL | Age: 72
End: 2022-06-27
Payer: COMMERCIAL

## 2022-06-27 DIAGNOSIS — R97.20 ELEVATED PSA MEASUREMENT: ICD-10-CM

## 2022-06-27 LAB — PSA SERPL-MCNC: 5.9 NG/ML (ref 0–4)

## 2022-06-27 PROCEDURE — 84153 ASSAY OF PSA TOTAL: CPT

## 2022-07-01 ENCOUNTER — OFFICE VISIT (OUTPATIENT)
Dept: UROLOGY | Facility: AMBULATORY SURGERY CENTER | Age: 72
End: 2022-07-01
Payer: COMMERCIAL

## 2022-07-01 VITALS
OXYGEN SATURATION: 96 % | HEART RATE: 74 BPM | WEIGHT: 201 LBS | HEIGHT: 65 IN | DIASTOLIC BLOOD PRESSURE: 76 MMHG | SYSTOLIC BLOOD PRESSURE: 122 MMHG | RESPIRATION RATE: 16 BRPM | BODY MASS INDEX: 33.49 KG/M2

## 2022-07-01 DIAGNOSIS — R97.20 ELEVATED PSA: ICD-10-CM

## 2022-07-01 PROCEDURE — 1160F RVW MEDS BY RX/DR IN RCRD: CPT | Performed by: NURSE PRACTITIONER

## 2022-07-01 PROCEDURE — 99204 OFFICE O/P NEW MOD 45 MIN: CPT | Performed by: NURSE PRACTITIONER

## 2022-07-01 NOTE — PROGRESS NOTES
7/1/2022    James Cristina  1950  [id number]      Assessment  -Elevated PSA    Discussion/Plan  Travis White is a 81 y o  male who presents in consultation    1  Elevated PSA- we reviewed the results of his recent PSA from 06/27/2022 which was 5 9, previously 4 7 on 11/03/2021  Given his elevated PSA value, I discussed proceeding with further testing to rule out prostate malignancy with a TRUS prostate biopsy and multiparametric MRI of prostate  Patient verbalizes and understands the options, but does not wish to proceed with additional testing at this time  He understands the risk of undiagnosed underlying prostate malignancy  He defers digital rectal examination in the office today  Patient states he would like to return in 3 months with another repeat PSA  Follow-up in 3 months with PSA  He was advised to call sooner with any issues     -All questions answered, patient agrees with plan      History of Present Illness  81 y o  male with a history of elevated PSA presents today for follow up  Patient referred by his PCP  A recent PSA from 06/27/2022 was 5 9  Prior PSA on 11/03/2021 was 4 7  Only additional PSA available to review from 2018 was 3 8  He states he has not received a digital rectal examination  Patient denies any strong family history of prostate cancer  He denies any lower urinary tract symptoms, gross hematuria, or dysuria  Patient denies any prior urologic history, surgical intervention, or instrumentation  Review of Systems  Review of Systems   Constitutional: Negative  HENT: Negative  Respiratory: Negative  Cardiovascular: Negative  Gastrointestinal: Negative  Genitourinary: Negative for decreased urine volume, difficulty urinating, dysuria, flank pain, hematuria and urgency  Musculoskeletal: Negative  Skin: Negative  Neurological: Negative  Psychiatric/Behavioral: Negative          Past Medical History  Past Medical History:   Diagnosis Date    Asthma  Colon, diverticulosis     Nonspecific reaction to 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 active tuberculosis     CXR HAS BEEN CLEAR   Pneumonia        Past Social History  Past Surgical History:   Procedure Laterality Date    COLONOSCOPY  11/2007    COMPLETE; DONE 11/2007 WITH NO POLYPS, +DIVERTICULA  2/14    TONSILLECTOMY      LAST ASSESSED: 7/9/14       Past Family History  Family History   Problem Relation Age of Onset    Diabetes Mother         MELLITUS    Coronary artery disease Mother     Coronary artery disease Father     Stroke Father         SYNDROME       Past Social history  Social History     Socioeconomic History    Marital status: /Civil Union     Spouse name: Not on file    Number of children: Not on file    Years of education: Not on file    Highest education level: Not on file   Occupational History    Not on file   Tobacco Use    Smoking status: Never Smoker    Smokeless tobacco: Never Used   Substance and Sexual Activity    Alcohol use: No    Drug use: No    Sexual activity: Not on file   Other Topics Concern    Not on file   Social History Narrative    Not on file     Social Determinants of Health     Financial Resource Strain: Not on file   Food Insecurity: Not on file   Transportation Needs: Not on file   Physical Activity: Not on file   Stress: Not on file   Social Connections: Not on file   Intimate Partner Violence: Not on file   Housing Stability: Not on file       Current Medications  Current Outpatient Medications   Medication Sig Dispense Refill    desoximetasone (TOPICORT) 0 25 % cream Apply topically 2 (two) times a day (Patient not taking: Reported on 7/1/2022) 60 g 1    sildenafil (Viagra) 100 mg tablet Take 1 tablet (100 mg total) by mouth as needed for erectile dysfunction (Patient not taking: Reported on 7/1/2022) 10 tablet 5     No current facility-administered medications for this visit         Allergies  Allergies   Allergen Reactions    Fish-Derived Products - Food Allergy Hives    Tuberculin Rash       Past Medical History, Social History, Family History, medications and allergies were reviewed  Vitals  Vitals:    07/01/22 0817   BP: 122/76   Pulse: 74   Resp: 16   SpO2: 96%   Weight: 91 2 kg (201 lb)   Height: 5' 5" (1 651 m)       Physical Exam  Physical Exam  Constitutional:       Appearance: Normal appearance  He is well-developed  HENT:      Head: Normocephalic  Eyes:      Pupils: Pupils are equal, round, and reactive to light  Pulmonary:      Effort: Pulmonary effort is normal    Abdominal:      Palpations: Abdomen is soft  Genitourinary:     Comments: Patient defers digital rectal examination in the office today  Musculoskeletal:         General: Normal range of motion  Cervical back: Normal range of motion  Skin:     General: Skin is warm and dry  Neurological:      General: No focal deficit present  Mental Status: He is alert and oriented to person, place, and time  Psychiatric:         Mood and Affect: Mood normal          Behavior: Behavior normal          Thought Content: Thought content normal          Judgment: Judgment normal          Results    I have personally reviewed all pertinent lab results and reviewed with patient  Lab Results   Component Value Date    PSA 5 9 (H) 06/27/2022    PSA 4 7 (H) 11/03/2021    PSA 3 8 07/06/2018     Lab Results   Component Value Date    GLUCOSE 110 07/08/2014    CALCIUM 8 9 11/03/2021     07/08/2014    K 4 0 11/03/2021    CO2 27 11/03/2021     11/03/2021    BUN 17 11/03/2021    CREATININE 1 15 11/03/2021     Lab Results   Component Value Date    WBC 6 41 11/03/2021    HGB 15 1 11/03/2021    HCT 45 0 11/03/2021    MCV 96 11/03/2021     11/03/2021     No results found for this or any previous visit (from the past 1 hour(s))

## 2022-10-12 PROBLEM — Z00.00 WELCOME TO MEDICARE PREVENTIVE VISIT: Status: RESOLVED | Noted: 2021-10-25 | Resolved: 2022-10-12

## 2022-11-11 PROBLEM — I35.0 SEVERE AORTIC STENOSIS: Status: RESOLVED | Noted: 2018-08-30 | Resolved: 2022-11-11

## 2022-11-14 ENCOUNTER — OFFICE VISIT (OUTPATIENT)
Dept: FAMILY MEDICINE CLINIC | Facility: CLINIC | Age: 72
End: 2022-11-14

## 2022-11-14 VITALS
RESPIRATION RATE: 16 BRPM | TEMPERATURE: 98.7 F | DIASTOLIC BLOOD PRESSURE: 78 MMHG | SYSTOLIC BLOOD PRESSURE: 122 MMHG | OXYGEN SATURATION: 98 % | BODY MASS INDEX: 33.29 KG/M2 | HEIGHT: 65 IN | WEIGHT: 199.8 LBS | HEART RATE: 78 BPM

## 2022-11-14 DIAGNOSIS — I35.0 MODERATE AORTIC STENOSIS: ICD-10-CM

## 2022-11-14 DIAGNOSIS — Z11.59 NEED FOR HEPATITIS C SCREENING TEST: ICD-10-CM

## 2022-11-14 DIAGNOSIS — R06.02 SOB (SHORTNESS OF BREATH) ON EXERTION: ICD-10-CM

## 2022-11-14 DIAGNOSIS — E66.09 CLASS 2 OBESITY DUE TO EXCESS CALORIES WITHOUT SERIOUS COMORBIDITY WITH BODY MASS INDEX (BMI) OF 35.0 TO 35.9 IN ADULT: ICD-10-CM

## 2022-11-14 DIAGNOSIS — E78.5 DYSLIPIDEMIA: ICD-10-CM

## 2022-11-14 DIAGNOSIS — R73.01 IMPAIRED FASTING GLUCOSE: Primary | ICD-10-CM

## 2022-11-14 DIAGNOSIS — R97.20 ELEVATED PSA MEASUREMENT: ICD-10-CM

## 2022-11-14 DIAGNOSIS — Z00.00 MEDICARE ANNUAL WELLNESS VISIT, SUBSEQUENT: ICD-10-CM

## 2022-11-14 NOTE — PROGRESS NOTES
Chief Complaint   Patient presents with   • Medicare Wellness Visit     AWV     Health Maintenance   Topic Date Due   • Hepatitis C Screening  Never done   • COVID-19 Vaccine (3 - Booster) 08/25/2021   • Influenza Vaccine (1) Never done   • Medicare Annual Wellness Visit (AWV)  10/27/2022   • BMI: Followup Plan  10/27/2022   • Fall Risk  11/14/2023   • Depression Screening  11/14/2023   • BMI: Adult  11/14/2023   • Colorectal Cancer Screening  06/30/2032   • Pneumococcal Vaccine: 65+ Years  Completed   • HIB Vaccine  Aged Out   • Hepatitis B Vaccine  Aged Out   • IPV Vaccine  Aged Out   • Hepatitis A Vaccine  Aged Out   • Meningococcal ACWY Vaccine  Aged Out   • HPV Vaccine  Aged Out        Assessment and Plan:     Problem List Items Addressed This Visit        Endocrine    Impaired fasting glucose - Primary     Follow a low carb diet, regular exercise  Check HbA1C  Relevant Orders    Comprehensive metabolic panel    Hemoglobin A1C       Cardiovascular and Mediastinum    Moderate aortic stenosis     Patient reports exertional shortness of breath  Denies dizziness, chest pain  Recommended to schedule echocardiogram   Schedule ollow-up visit with cardiology to discuss further cardiac testing, TAVR  Relevant Orders    Echo complete w/ contrast if indicated    Ambulatory Referral to Cardiology       Other    Obesity     Patient lost 13 lb since October 2021  Continue to work dietary and lifestyle modifications, losing weight  Relevant Orders    Comprehensive metabolic panel    Dyslipidemia     Follow a low-cholesterol, low-fat diet, regular exercise  Check CMP, lipid panel  Relevant Orders    Lipid panel    Elevated PSA measurement     PSA 5 9 from June 2021  Patient was seen by urology CRNP in July 2021, declined prostate biopsy  Patient states that he is not interested in any invasive procedure  Recheck PSA  Follow-up with urology               Relevant Orders PSA Total, Diagnostic    Medicare annual wellness visit, subsequent      Other Visit Diagnoses     SOB (shortness of breath) on exertion        Relevant Orders    Echo complete w/ contrast if indicated    Ambulatory Referral to Cardiology    Need for hepatitis C screening test        Relevant Orders    Hepatitis C Antibody ([facility])          Schedule follow-up visit, medicare AWV in 1 year  Call office with any acute problems  BMI Counseling: Body mass index is 33 25 kg/m²  The BMI is above normal  Nutrition recommendations include decreasing portion sizes, encouraging healthy choices of fruits and vegetables, decreasing fast food intake, consuming healthier snacks, limiting drinks that contain sugar, moderation in carbohydrate intake, increasing intake of lean protein, reducing intake of saturated and trans fat and reducing intake of cholesterol  Exercise recommendations include exercising 3-5 times per week  No pharmacotherapy was ordered  Rationale for BMI follow-up plan is due to patient being overweight or obese  Preventive health issues were discussed with patient, and age appropriate screening tests were ordered as noted in patient's After Visit Summary  Personalized health advice and appropriate referrals for health education or preventive services given if needed, as noted in patient's After Visit Summary  History of Present Illness:     Patient presents for a Medicare Wellness Visit    HPI     Patient presents for annual follow-up visit, Medicare AWV  Patient remains active, follows a healthy diet  Lost 13 lb since last visit in 10/2021  PMHx: Dyslipidemia, Obesity, Moderate AS, IFG, ED  Reviewed current medications last blood at work from November 2021  Patient does not taking any medications or dietary supplements  He reports shortness of breath with exertion, needs to stop after walking 3 blocks    Denies chest pain, dizziness, heart palpitations  Echocardiogram done in December 2021 showed EF 57%, grade 1 diastolic dysfunction, moderate aortic stenosis  Patient was seen by cardiology Dr Jonna Licea last year  Patient would like to schedule follow-up visit with cardiology, discuss TAVR surgical procedure  Patient plans a trip to [facility] in March next year  Patient has history of elevated PSA level  PSA 5 9 from June 2021  Patient was seen by urology CRNP in July 2021, declined prostate biopsy  Patient states that he is not interested in any invasive procedure  Reports no urinary symptoms, no nocturia  Family history is positive for MI in his father  Mother had diabetes  Colonoscopy done June 2022 by colorectal surgeon Dr Ellen Snow who recommended to repeat colonoscopy 10 years,    Declined Flu vaccination  Patient Care Team:  Domenic Santos MD as PCP - General     Review of Systems:     Review of Systems   Constitutional: Negative for activity change, appetite change, chills, fatigue and fever  HENT: Negative for congestion, ear pain, hearing loss, nosebleeds, sore throat and trouble swallowing  Eyes: Negative  Respiratory: Positive for shortness of breath (with exertion)  Negative for cough, chest tightness and wheezing  Cardiovascular: Negative for chest pain, palpitations and leg swelling  Gastrointestinal: Negative for abdominal pain, blood in stool, constipation, diarrhea, nausea and vomiting  Genitourinary: Negative for difficulty urinating, dysuria, flank pain and hematuria  Musculoskeletal: Negative for arthralgias, back pain, gait problem, joint swelling and myalgias  Skin: Negative for rash  Neurological: Negative for dizziness, syncope and headaches  Hematological: Negative  Psychiatric/Behavioral: Negative for dysphoric mood and sleep disturbance  The patient is not nervous/anxious           Problem List:     Patient Active Problem List   Diagnosis   • ED (erectile dysfunction)   • Impaired fasting glucose   • Obesity   • Dyslipidemia   • Elevated PSA measurement   • Moderate aortic stenosis   • Medicare annual wellness visit, subsequent      Past Medical and Surgical History:     Past Medical History:   Diagnosis Date   • Asthma    • Colon, diverticulosis    • Nonspecific reaction to 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 active tuberculosis     CXR HAS BEEN CLEAR  • Pneumonia      Past Surgical History:   Procedure Laterality Date   • COLONOSCOPY  11/2007    COMPLETE; DONE 11/2007 WITH NO POLYPS, +DIVERTICULA  2/14   • TONSILLECTOMY      LAST ASSESSED: 7/9/14      Family History:     Family History   Problem Relation Age of Onset   • Diabetes Mother         MELLITUS   • Coronary artery disease Mother    • Coronary artery disease Father    • Stroke Father         SYNDROME      Social History:     Social History     Socioeconomic History   • Marital status: /Civil Union     Spouse name: None   • Number of children: None   • Years of education: None   • Highest education level: None   Occupational History   • None   Tobacco Use   • Smoking status: Never Smoker   • Smokeless tobacco: Never Used   Substance and Sexual Activity   • Alcohol use: No   • Drug use: No   • Sexual activity: None   Other Topics Concern   • None   Social History Narrative   • None     Social Determinants of Health     Financial Resource Strain: Low Risk    • Difficulty of Paying Living Expenses: Not hard at all   Food Insecurity: Not on file   Transportation Needs: No Transportation Needs   • Lack of Transportation (Medical): No   • Lack of Transportation (Non-Medical):  No   Physical Activity: Not on file   Stress: Not on file   Social Connections: Not on file   Intimate Partner Violence: Not on file   Housing Stability: Not on file      Medications and Allergies:     Current Outpatient Medications   Medication Sig Dispense Refill   • desoximetasone (TOPICORT) 0 25 % cream Apply topically 2 (two) times a day (Patient not taking: Reported on 7/1/2022) 60 g 1   • sildenafil (Viagra) 100 mg tablet Take 1 tablet (100 mg total) by mouth as needed for erectile dysfunction (Patient not taking: Reported on 7/1/2022) 10 tablet 5     No current facility-administered medications for this visit  Allergies   Allergen Reactions   • Fish-Derived Products - Food Allergy Hives   • Tuberculin Rash      Immunizations:     Immunization History   Administered Date(s) Administered   • COVID-19 MODERNA VACC 0 5 ML IM 03/04/2021, 03/25/2021   • Pneumococcal Conjugate 13-Valent 07/03/2018   • Pneumococcal Polysaccharide PPV23 10/27/2021   • Tdap 01/29/2008, 07/03/2018      Health Maintenance:         Topic Date Due   • Hepatitis C Screening  Never done   • Colorectal Cancer Screening  06/30/2032         Topic Date Due   • COVID-19 Vaccine (3 - Booster) 08/25/2021   • Influenza Vaccine (1) Never done      Medicare Screening Tests and Risk Assessments:     Jaz Sifuentes is here for his Subsequent Wellness visit  Health Risk Assessment:   Patient rates overall health as very good  Patient feels that their physical health rating is same  Patient is very satisfied with their life  Eyesight was rated as same  Hearing was rated as same  Patient feels that their emotional and mental health rating is same  Patients states they are never, rarely angry  Patient states they are never, rarely unusually tired/fatigued  Pain experienced in the last 7 days has been none  Patient states that he has experienced weight loss or gain in last 6 months  Fall Risk Screening: In the past year, patient has experienced: no history of falling in past year      Home Safety:  Patient does not have trouble with stairs inside or outside of their home  Patient has working smoke alarms and has no working carbon monoxide detector  Home safety hazards include: none  Nutrition:   Current diet is Regular       Medications:   Patient is not currently taking any over-the-counter supplements  Patient is not able to manage medications  Activities of Daily Living (ADLs)/Instrumental Activities of Daily Living (IADLs):   Walk and transfer into and out of bed and chair?: Yes  Dress and groom yourself?: Yes    Bathe or shower yourself?: Yes    Feed yourself? Yes  Do your laundry/housekeeping?: Yes  Manage your money, pay your bills and track your expenses?: Yes  Make your own meals?: Yes    Do your own shopping?: Yes    Durable Medical Equipment Suppliers  None    Previous Hospitalizations:   Any hospitalizations or ED visits within the last 12 months?: No      Advance Care Planning:   Living will: Yes    Durable POA for healthcare: Yes    Advanced directive: Yes    Advanced directive counseling given: Yes      Cognitive Screening:   Provider or family/friend/caregiver concerned regarding cognition?: No    PREVENTIVE SCREENINGS      Cardiovascular Screening:    General: Screening Current      Diabetes Screening:       Due for: Blood Glucose      Colorectal Cancer Screening:     General: Screening Current      Prostate Cancer Screening:    General: Screening Current      Osteoporosis Screening:    General: Risks and Benefits Discussed      Abdominal Aortic Aneurysm (AAA) Screening:    Risk factors include: age between 73-67 yo        Lung Cancer Screening:     General: Screening Not Indicated      Hepatitis C Screening:    General: Risks and Benefits Discussed    Hep C Screening Accepted: Yes      Screening, Brief Intervention, and Referral to Treatment (SBIRT)    Screening  Typical number of drinks in a day: 0  Typical number of drinks in a week: 0  Interpretation: Low risk drinking behavior  AUDIT-C Screenin) How often did you have a drink containing alcohol in the past year? monthly or less  2) How many drinks did you have on a typical day when you were drinking in the past year? 1 to 2  3) How often did you have 6 or more drinks on one occasion in the past year? never    AUDIT-C Score: 1  Interpretation: Score 0-3 (male): Negative screen for alcohol misuse    Single Item Drug Screening:  How often have you used an illegal drug (including marijuana) or a prescription medication for non-medical reasons in the past year? never    Single Item Drug Screen Score: 0  Interpretation: Negative screen for possible drug use disorder    Other Counseling Topics:   Car/seat belt/driving safety, skin self-exam and calcium and vitamin D intake and regular weightbearing exercise  No exam data present     Physical Exam:     /78 (BP Location: Left arm, Patient Position: Sitting)   Pulse 78   Temp 98 7 °F (37 1 °C) (Tympanic)   Resp 16   Ht 5' 5" (1 651 m)   Wt 90 6 kg (199 lb 12 8 oz)   SpO2 98%   BMI 33 25 kg/m²     Physical Exam  Vitals and nursing note reviewed  Constitutional:       Appearance: Normal appearance  He is obese  HENT:      Head: Normocephalic and atraumatic  Eyes:      Conjunctiva/sclera: Conjunctivae normal       Pupils: Pupils are equal, round, and reactive to light  Cardiovascular:      Rate and Rhythm: Normal rate and regular rhythm  Heart sounds: Murmur (harsh systolic murmur at R USB  ) heard  Pulmonary:      Effort: Pulmonary effort is normal       Breath sounds: Normal breath sounds  Abdominal:      General: Bowel sounds are normal  There is no distension  Palpations: Abdomen is soft  Tenderness: There is no abdominal tenderness  Musculoskeletal:         General: No swelling, tenderness or deformity  Normal range of motion  Cervical back: Normal range of motion and neck supple  Right lower leg: No edema  Left lower leg: No edema  Skin:     General: Skin is warm and dry  Findings: No rash  Neurological:      General: No focal deficit present  Mental Status: He is alert and oriented to person, place, and time  Motor: No weakness        Gait: Gait normal    Psychiatric:         Mood and Affect: Mood normal           Tanya Harvey MD

## 2022-11-14 NOTE — ASSESSMENT & PLAN NOTE
PSA 5 9 from June 2021  Patient was seen by urology CRNP in July 2021, declined prostate biopsy  Patient states that he is not interested in any invasive procedure  Recheck PSA  Follow-up with urology

## 2022-11-14 NOTE — PATIENT INSTRUCTIONS
Medicare Preventive Visit Patient Instructions  Thank you for completing your Welcome to Medicare Visit or Medicare Annual Wellness Visit today  Your next wellness visit will be due in one year (11/15/2023)  The screening/preventive services that you may require over the next 5-10 years are detailed below  Some tests may not apply to you based off risk factors and/or age  Screening tests ordered at today's visit but not completed yet may show as past due  Also, please note that scanned in results may not display below  Preventive Screenings:  Service Recommendations Previous Testing/Comments   Colorectal Cancer Screening  · Colonoscopy    · Fecal Occult Blood Test (FOBT)/Fecal Immunochemical Test (FIT)  · Fecal DNA/Cologuard Test  · Flexible Sigmoidoscopy Age: 39-70 years old   Colonoscopy: every 10 years (May be performed more frequently if at higher risk)  OR  FOBT/FIT: every 1 year  OR  Cologuard: every 3 years  OR  Sigmoidoscopy: every 5 years  Screening may be recommended earlier than age 39 if at higher risk for colorectal cancer  Also, an individualized decision between you and your healthcare provider will decide whether screening between the ages of 74-80 would be appropriate  Colonoscopy: 06/30/2022  FOBT/FIT: Not on file  Cologuard: Not on file  Sigmoidoscopy: Not on file          Prostate Cancer Screening Individualized decision between patient and health care provider in men between ages of 53-78   Medicare will cover every 12 months beginning on the day after your 50th birthday PSA: 5 9 ng/mL           Hepatitis C Screening Once for adults born between 1945 and 1965  More frequently in patients at high risk for Hepatitis C Hep C Antibody: Not on file        Diabetes Screening 1-2 times per year if you're at risk for diabetes or have pre-diabetes Fasting glucose: 133 mg/dL (11/3/2021)  A1C: 5 3 % (11/3/2021)      Cholesterol Screening Once every 5 years if you don't have a lipid disorder   May order more often based on risk factors  Lipid panel: 11/03/2021         Other Preventive Screenings Covered by Medicare:  1  Abdominal Aortic Aneurysm (AAA) Screening: covered once if your at risk  You're considered to be at risk if you have a family history of AAA or a male between the age of 73-68 who smoking at least 100 cigarettes in your lifetime  2  Lung Cancer Screening: covers low dose CT scan once per year if you meet all of the following conditions: (1) Age 50-69; (2) No signs or symptoms of lung cancer; (3) Current smoker or have quit smoking within the last 15 years; (4) You have a tobacco smoking history of at least 20 pack years (packs per day x number of years you smoked); (5) You get a written order from a healthcare provider  3  Glaucoma Screening: covered annually if you're considered high risk: (1) You have diabetes OR (2) Family history of glaucoma OR (3)  aged 48 and older OR (3)  American aged 72 and older  3  Osteoporosis Screening: covered every 2 years if you meet one of the following conditions: (1) Have a vertebral abnormality; (2) On glucocorticoid therapy for more than 3 months; (3) Have primary hyperparathyroidism; (4) On osteoporosis medications and need to assess response to drug therapy  5  HIV Screening: covered annually if you're between the age of 12-76  Also covered annually if you are younger than 13 and older than 72 with risk factors for HIV infection  For pregnant patients, it is covered up to 3 times per pregnancy      Immunizations:  Immunization Recommendations   Influenza Vaccine Annual influenza vaccination during flu season is recommended for all persons aged >= 6 months who do not have contraindications   Pneumococcal Vaccine   * Pneumococcal conjugate vaccine = PCV13 (Prevnar 13), PCV15 (Vaxneuvance), PCV20 (Prevnar 20)  * Pneumococcal polysaccharide vaccine = PPSV23 (Pneumovax) Adults 25-60 years old: 1-3 doses may be recommended based on certain risk factors  Adults 72 years old: 1-2 doses may be recommended based off what pneumonia vaccine you previously received   Hepatitis B Vaccine 3 dose series if at intermediate or high risk (ex: diabetes, end stage renal disease, liver disease)   Tetanus (Td) Vaccine - COST NOT COVERED BY MEDICARE PART B Following completion of primary series, a booster dose should be given every 10 years to maintain immunity against tetanus  Td may also be given as tetanus wound prophylaxis  Tdap Vaccine - COST NOT COVERED BY MEDICARE PART B Recommended at least once for all adults  For pregnant patients, recommended with each pregnancy  Shingles Vaccine (Shingrix) - COST NOT COVERED BY MEDICARE PART B  2 shot series recommended in those aged 48 and above     Health Maintenance Due:      Topic Date Due   • Hepatitis C Screening  Never done   • Colorectal Cancer Screening  06/30/2032     Immunizations Due:      Topic Date Due   • COVID-19 Vaccine (3 - Booster) 08/25/2021   • Influenza Vaccine (1) Never done     Advance Directives   What are advance directives? Advance directives are legal documents that state your wishes and plans for medical care  These plans are made ahead of time in case you lose your ability to make decisions for yourself  Advance directives can apply to any medical decision, such as the treatments you want, and if you want to donate organs  What are the types of advance directives? There are many types of advance directives, and each state has rules about how to use them  You may choose a combination of any of the following:  · Living will: This is a written record of the treatment you want  You can also choose which treatments you do not want, which to limit, and which to stop at a certain time  This includes surgery, medicine, IV fluid, and tube feedings  · Durable power of  for healthcare [facility]):   This is a written record that states who you want to make healthcare choices for you when you are unable to make them for yourself  This person, called a proxy, is usually a family member or a friend  You may choose more than 1 proxy  · Do not resuscitate (DNR) order:  A DNR order is used in case your heart stops beating or you stop breathing  It is a request not to have certain forms of treatment, such as CPR  A DNR order may be included in other types of advance directives  · Medical directive: This covers the care that you want if you are in a coma, near death, or unable to make decisions for yourself  You can list the treatments you want for each condition  Treatment may include pain medicine, surgery, blood transfusions, dialysis, IV or tube feedings, and a ventilator (breathing machine)  · Values history: This document has questions about your views, beliefs, and how you feel and think about life  This information can help others choose the care that you would choose  Why are advance directives important? An advance directive helps you control your care  Although spoken wishes may be used, it is better to have your wishes written down  Spoken wishes can be misunderstood, or not followed  Treatments may be given even if you do not want them  An advance directive may make it easier for your family to make difficult choices about your care  Weight Management   Why it is important to manage your weight:  Being overweight increases your risk of health conditions such as heart disease, high blood pressure, type 2 diabetes, and certain types of cancer  It can also increase your risk for osteoarthritis, sleep apnea, and other respiratory problems  Aim for a slow, steady weight loss  Even a small amount of weight loss can lower your risk of health problems  How to lose weight safely:  A safe and healthy way to lose weight is to eat fewer calories and get regular exercise  You can lose up about 1 pound a week by decreasing the number of calories you eat by 500 calories each day     Healthy meal plan for weight management:  A healthy meal plan includes a variety of foods, contains fewer calories, and helps you stay healthy  A healthy meal plan includes the following:  · Eat whole-grain foods more often  A healthy meal plan should contain fiber  Fiber is the part of grains, fruits, and vegetables that is not broken down by your body  Whole-grain foods are healthy and provide extra fiber in your diet  Some examples of whole-grain foods are whole-wheat breads and pastas, oatmeal, brown rice, and bulgur  · Eat a variety of vegetables every day  Include dark, leafy greens such as spinach, kale, jose angel greens, and mustard greens  Eat yellow and orange vegetables such as carrots, sweet potatoes, and winter squash  · Eat a variety of fruits every day  Choose fresh or canned fruit (canned in its own juice or light syrup) instead of juice  Fruit juice has very little or no fiber  · Eat low-fat dairy foods  Drink fat-free (skim) milk or 1% milk  Eat fat-free yogurt and low-fat cottage cheese  Try low-fat cheeses such as mozzarella and other reduced-fat cheeses  · Choose meat and other protein foods that are low in fat  Choose beans or other legumes such as split peas or lentils  Choose fish, skinless poultry (chicken or turkey), or lean cuts of red meat (beef or pork)  Before you cook meat or poultry, cut off any visible fat  · Use less fat and oil  Try baking foods instead of frying them  Add less fat, such as margarine, sour cream, regular salad dressing and mayonnaise to foods  Eat fewer high-fat foods  Some examples of high-fat foods include french fries, doughnuts, ice cream, and cakes  · Eat fewer sweets  Limit foods and drinks that are high in sugar  This includes candy, cookies, regular soda, and sweetened drinks  Exercise:  Exercise at least 30 minutes per day on most days of the week  Some examples of exercise include walking, biking, dancing, and swimming   You can also fit in more physical activity by taking the stairs instead of the elevator or parking farther away from stores  Ask your healthcare provider about the best exercise plan for you  © Copyright [employer] 2018 Information is for End User's use only and may not be sold, redistributed or otherwise used for commercial purposes   All illustrations and images included in CareNotes® are the copyrighted property of A D A M , Inc  or [address] [place] [address] [employer]

## 2022-11-14 NOTE — ASSESSMENT & PLAN NOTE
Patient reports exertional shortness of breath  Denies dizziness, chest pain  Recommended to schedule echocardiogram   Schedule ollow-up visit with cardiology to discuss further cardiac testing, TAVR

## 2022-11-14 NOTE — ASSESSMENT & PLAN NOTE
Patient lost 13 lb since October 2021  Continue to work dietary and lifestyle modifications, losing weight

## 2022-11-29 ENCOUNTER — APPOINTMENT (OUTPATIENT)
Dept: LAB | Facility: CLINIC | Age: 72
End: 2022-11-29

## 2022-11-29 DIAGNOSIS — E78.5 DYSLIPIDEMIA: ICD-10-CM

## 2022-11-29 DIAGNOSIS — R97.20 ELEVATED PSA MEASUREMENT: ICD-10-CM

## 2022-11-29 DIAGNOSIS — E66.09 CLASS 2 OBESITY DUE TO EXCESS CALORIES WITHOUT SERIOUS COMORBIDITY WITH BODY MASS INDEX (BMI) OF 35.0 TO 35.9 IN ADULT: ICD-10-CM

## 2022-11-29 DIAGNOSIS — Z11.59 NEED FOR HEPATITIS C SCREENING TEST: ICD-10-CM

## 2022-11-29 DIAGNOSIS — R97.20 ELEVATED PSA: ICD-10-CM

## 2022-11-29 DIAGNOSIS — R73.01 IMPAIRED FASTING GLUCOSE: ICD-10-CM

## 2022-11-29 LAB
ALBUMIN SERPL BCP-MCNC: 3.4 G/DL (ref 3.5–5)
ALP SERPL-CCNC: 75 U/L (ref 46–116)
ALT SERPL W P-5'-P-CCNC: 24 U/L (ref 12–78)
ANION GAP SERPL CALCULATED.3IONS-SCNC: 3 MMOL/L (ref 4–13)
AST SERPL W P-5'-P-CCNC: 13 U/L (ref 5–45)
BILIRUB SERPL-MCNC: 0.56 MG/DL (ref 0.2–1)
BUN SERPL-MCNC: 21 MG/DL (ref 5–25)
CALCIUM ALBUM COR SERPL-MCNC: 9.4 MG/DL (ref 8.3–10.1)
CALCIUM SERPL-MCNC: 8.9 MG/DL (ref 8.3–10.1)
CHLORIDE SERPL-SCNC: 109 MMOL/L (ref 96–108)
CHOLEST SERPL-MCNC: 177 MG/DL
CO2 SERPL-SCNC: 27 MMOL/L (ref 21–32)
CREAT SERPL-MCNC: 1.23 MG/DL (ref 0.6–1.3)
EST. AVERAGE GLUCOSE BLD GHB EST-MCNC: 111 MG/DL
GFR SERPL CREATININE-BSD FRML MDRD: 59 ML/MIN/1.73SQ M
GLUCOSE P FAST SERPL-MCNC: 125 MG/DL (ref 65–99)
HBA1C MFR BLD: 5.5 %
HCV AB SER QL: NORMAL
HDLC SERPL-MCNC: 48 MG/DL
LDLC SERPL CALC-MCNC: 119 MG/DL (ref 0–100)
NONHDLC SERPL-MCNC: 129 MG/DL
POTASSIUM SERPL-SCNC: 4.6 MMOL/L (ref 3.5–5.3)
PROT SERPL-MCNC: 7.8 G/DL (ref 6.4–8.4)
PSA SERPL-MCNC: 6.4 NG/ML (ref 0–4)
PSA SERPL-MCNC: 6.4 NG/ML (ref 0–4)
SODIUM SERPL-SCNC: 139 MMOL/L (ref 135–147)
TRIGL SERPL-MCNC: 51 MG/DL

## 2022-12-02 ENCOUNTER — TELEPHONE (OUTPATIENT)
Dept: UROLOGY | Facility: HOSPITAL | Age: 72
End: 2022-12-02

## 2022-12-02 ENCOUNTER — TELEPHONE (OUTPATIENT)
Dept: UROLOGY | Facility: AMBULATORY SURGERY CENTER | Age: 72
End: 2022-12-02

## 2022-12-02 NOTE — TELEPHONE ENCOUNTER
LM for patient the his PSA is 6 4 - he was last seen 7/2022 - he was to follow up in 6 motnhs but canceled appointment It is recommended that he have and MRI of prostate, a biopsy in the office or to scheduled a follow up to discuss [phone] was left for him to call

## 2022-12-02 NOTE — TELEPHONE ENCOUNTER
----- Message from [id number] Fatuma Rubio sent at 12/2/2022  2:36 PM EST -----  Received results of recent PSA which is 6 4  He was last seen in the office in July 2022  We had discussed further testing, but he deferred and was to follow-up in 3 months  Patient had canceled follow-up  Recommend patient proceed with multiparametric MRI of prostate or prostate biopsy in the office  If patient has any further questions, he should be scheduled for an appointment as soon as possible to discuss

## 2023-01-09 ENCOUNTER — HOSPITAL ENCOUNTER (OUTPATIENT)
Dept: NON INVASIVE DIAGNOSTICS | Facility: CLINIC | Age: 73
Discharge: HOME | End: 2023-01-09

## 2023-01-09 VITALS
HEART RATE: 65 BPM | BODY MASS INDEX: 33.15 KG/M2 | SYSTOLIC BLOOD PRESSURE: 122 MMHG | DIASTOLIC BLOOD PRESSURE: 78 MMHG | WEIGHT: 199 LBS | HEIGHT: 65 IN

## 2023-01-09 DIAGNOSIS — I35.0 MODERATE AORTIC STENOSIS: ICD-10-CM

## 2023-01-09 DIAGNOSIS — R06.02 SOB (SHORTNESS OF BREATH) ON EXERTION: ICD-10-CM

## 2023-01-09 LAB
AORTIC ROOT: 3.4 CM
AORTIC VALVE MEAN VELOCITY: 33.2 M/S
APICAL FOUR CHAMBER EJECTION FRACTION: 54 %
ASCENDING AORTA: 3.6 CM
AV AREA BY CONTINUOUS VTI: 0.6 CM2
AV AREA PEAK VELOCITY: 0.5 CM2
AV LVOT MEAN GRADIENT: 1 MMHG
AV LVOT PEAK GRADIENT: 1 MMHG
AV MEAN GRADIENT: 53 MMHG
AV PEAK GRADIENT: 89 MMHG
AV VALVE AREA: 0.57 CM2
AV VELOCITY RATIO: 0.12
DOP CALC AO PEAK VEL: 4.53 M/S
DOP CALC AO VTI: 116.07 CM
DOP CALC LVOT AREA: 4.15 CM2
DOP CALC LVOT DIAMETER: 2.3 CM
DOP CALC LVOT PEAK VEL VTI: 15.92 CM
DOP CALC LVOT PEAK VEL: 0.53 M/S
DOP CALC LVOT STROKE INDEX: 34.8 ML/M2
DOP CALC LVOT STROKE VOLUME: 66.11 CM3
E WAVE DECELERATION TIME: 421 MS
FRACTIONAL SHORTENING: 43 % (ref 28–44)
INTERVENTRICULAR SEPTUM IN DIASTOLE (PARASTERNAL SHORT AXIS VIEW): 1.2 CM
INTERVENTRICULAR SEPTUM: 1.2 CM (ref 0.6–1.1)
LAAS-AP2: 25.5 CM2
LAAS-AP4: 22.4 CM2
LEFT ATRIUM AREA SYSTOLE SINGLE PLANE A4C: 18.3 CM2
LEFT ATRIUM SIZE: 4.3 CM
LEFT INTERNAL DIMENSION IN SYSTOLE: 2.8 CM (ref 2.1–4)
LEFT VENTRICULAR INTERNAL DIMENSION IN DIASTOLE: 4.9 CM (ref 3.5–6)
LEFT VENTRICULAR POSTERIOR WALL IN END DIASTOLE: 1.2 CM
LEFT VENTRICULAR STROKE VOLUME: 82 ML
LVSV (TEICH): 82 ML
MV E'TISSUE VEL-SEP: 6 CM/S
MV PEAK A VEL: 0.83 M/S
MV PEAK E VEL: 59 CM/S
MV STENOSIS PRESSURE HALF TIME: 122 MS
MV VALVE AREA P 1/2 METHOD: 1.8 CM2
RIGHT ATRIUM AREA SYSTOLE A4C: 14.1 CM2
RIGHT VENTRICLE ID DIMENSION: 3.5 CM
SL CV LEFT ATRIUM LENGTH A2C: 6 CM
SL CV LV EF: 60
SL CV PED ECHO LEFT VENTRICLE DIASTOLIC VOLUME (MOD BIPLANE) 2D: 112 ML
SL CV PED ECHO LEFT VENTRICLE SYSTOLIC VOLUME (MOD BIPLANE) 2D: 30 ML
TRICUSPID ANNULAR PLANE SYSTOLIC EXCURSION: 1.7 CM

## 2023-01-10 PROBLEM — Z00.00 MEDICARE ANNUAL WELLNESS VISIT, SUBSEQUENT: Status: RESOLVED | Noted: 2021-10-25 | Resolved: 2023-01-10

## 2023-02-01 ENCOUNTER — TELEPHONE (OUTPATIENT)
Dept: CARDIAC SURGERY | Facility: CLINIC | Age: 73
End: 2023-02-01

## 2023-02-01 ENCOUNTER — TELEPHONE (OUTPATIENT)
Dept: CARDIOLOGY CLINIC | Facility: CLINIC | Age: 73
End: 2023-02-01

## 2023-02-01 ENCOUNTER — PREP FOR PROCEDURE (OUTPATIENT)
Dept: CARDIOLOGY CLINIC | Facility: CLINIC | Age: 73
End: 2023-02-01

## 2023-02-01 ENCOUNTER — OFFICE VISIT (OUTPATIENT)
Dept: CARDIOLOGY CLINIC | Facility: CLINIC | Age: 73
End: 2023-02-01

## 2023-02-01 VITALS
HEART RATE: 82 BPM | DIASTOLIC BLOOD PRESSURE: 80 MMHG | BODY MASS INDEX: 34.35 KG/M2 | HEIGHT: 65 IN | OXYGEN SATURATION: 99 % | SYSTOLIC BLOOD PRESSURE: 110 MMHG | WEIGHT: 206.2 LBS

## 2023-02-01 DIAGNOSIS — I35.0 MODERATE AORTIC STENOSIS: ICD-10-CM

## 2023-02-01 DIAGNOSIS — R06.02 SOB (SHORTNESS OF BREATH) ON EXERTION: ICD-10-CM

## 2023-02-01 DIAGNOSIS — I35.0 SEVERE AORTIC STENOSIS BY PRIOR ECHOCARDIOGRAM: Primary | ICD-10-CM

## 2023-02-01 DIAGNOSIS — I35.0 MODERATE AORTIC STENOSIS: Primary | ICD-10-CM

## 2023-02-01 DIAGNOSIS — R07.9 CHEST PAIN, UNSPECIFIED TYPE: Primary | ICD-10-CM

## 2023-02-01 NOTE — TELEPHONE ENCOUNTER
Patient scheduled for R+LHC at [facility] on 02/17/2023     with Dr Nikki Joseph  Patient aware of general instructions, labs test required     No Meds holds:   Can we please check insurance for approval

## 2023-02-01 NOTE — PROGRESS NOTES
Cardiology New Patient Visit     Nathan Rivera  [id number]  1950  HEART & VASCULAR [facility] [place]  [phone] [address]    Diagnoses and all orders for this visit:    Moderate aortic stenosis  -     Ambulatory Referral to Cardiology  -     POCT ECG    SOB (shortness of breath) on exertion  -     Ambulatory Referral to Cardiology  -     POCT ECG        I had the pleasure of seeing Nathan Rivera who presents for an overdue visit - seen in 2018 last    History of the Presenting Illness, Discussion/Summary and My Plan are as follows:::    He is a very pleasant 77-year-old gentleman who presents for a long overdue follow up of aortic stenosis now severe, originally saw me for moderate AS from July 2018      He does not have a history of hypertension, diabetes or dyslipidemia  LDL is 110  No family history of premature coronary artery disease  His dad had a heart attack at the age of 72        For evaluation of murmur, echo in 2018 reported ' severe aortic stenosis', I reviewed his images myself and did the measurements also  In 2018-(peak gradient-66, mean gradient -34, VTI Ix:  28/99= 0 28; LVOT diameter 2 4, valve area -1 2), also had mild aortic insufficiency  I personally reviewed his current echo with measurements stated below    At that time he was asymptomatic, shortness of breath only with unaccustomed exertion and in the setting of having put on 20 pounds in the preceding 1 year  Subsequently was lost to follow-up  He is here today, with shortness of breath with exertion, mild chest discomfort with exertion noticed since October 2022  In addition he feels dizzy when he stands up too quickly which is also a new symptom  Has lost a little weight recently    He also underwent an echo that now shows critical aortic stenosis       He remains mentally and physically active, 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        Plan:     Aortic stenosis:  Moderate in 2018, n now symptomatic and severe/critical aortic stenosis:   January 2023: Peak/mean gradients of 103/63,, index 0 12 (although limited by LVOT Doppler measurement), valve area 0 6, EF 60%, trace AI     CP and HURD: likely from AS, will set up for cor angio as well  Also worse with eating  Intolerant to ASA - dyspepsia  Will set him up for coronary angiography as well as give him a referral for cardiac surgery      Blood pressure is well controlled     Follow-up in 6 months     Latest Reference Range & Units 11/29/22 07:11   Cholesterol See Comment mg/dL 177   Triglycerides See Comment mg/dL 51   HDL >=40 mg/dL 48   Non-HDL Cholesterol mg/dl 129   LDL Calculated 0 - 100 mg/dL 119 (H)   (H): Data is abnormally high     Latest Reference Range & Units 11/03/21 07:28 11/29/22 07:11   BUN 5 - 25 mg/dL 17 21   Creatinine 0 60 - 1 30 mg/dL 1 15 1 23     1  Moderate aortic stenosis  Ambulatory Referral to Cardiology    POCT ECG      2  SOB (shortness of breath) on exertion  Ambulatory Referral to Cardiology    POCT ECG        Patient Active Problem List   Diagnosis   • ED (erectile dysfunction)   • Impaired fasting glucose   • Obesity   • Dyslipidemia   • Elevated PSA measurement   • Moderate aortic stenosis     Past Medical History:   Diagnosis Date   • Asthma    • Colon, diverticulosis    • Nonspecific reaction to 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 active tuberculosis     CXR HAS BEEN CLEAR     • Pneumonia      Social History     Socioeconomic History   • Marital status: /Civil Union     Spouse name: Not on file   • Number of children: Not on file   • Years of education: Not on file   • Highest education level: Not on file   Occupational History   • Not on file   Tobacco Use   • Smoking status: Never   • Smokeless tobacco: Never   Substance and Sexual Activity   • Alcohol use: No   • Drug use: No   • Sexual activity: Not on file   Other Topics Concern   • Not on file   Social History Narrative   • Not on file     Social Determinants of Health     Financial Resource Strain: Low Risk    • Difficulty of Paying Living Expenses: Not hard at all   Food Insecurity: Not on file   Transportation Needs: No Transportation Needs   • Lack of Transportation (Medical): No   • Lack of Transportation (Non-Medical): No   Physical Activity: Not on file   Stress: Not on file   Social Connections: Not on file   Intimate Partner Violence: Not on file   Housing Stability: Not on file      Family History   Problem Relation Age of Onset   • Diabetes Mother         MELLITUS   • Coronary artery disease Mother    • Coronary artery disease Father    • Stroke Father         SYNDROME     Past Surgical History:   Procedure Laterality Date   • COLONOSCOPY  11/2007    COMPLETE; DONE 11/2007 WITH NO POLYPS, +DIVERTICULA  2/14   • TONSILLECTOMY      LAST ASSESSED: 7/9/14       Current Outpatient Medications:   •  desoximetasone (TOPICORT) 0 25 % cream, Apply topically 2 (two) times a day (Patient taking differently: Apply topically 2 (two) times a day prn), Disp: 60 g, Rfl: 1  •  sildenafil (Viagra) 100 mg tablet, Take 1 tablet (100 mg total) by mouth as needed for erectile dysfunction, Disp: 10 tablet, Rfl: 5  Allergies   Allergen Reactions   • Fish-Derived Products - Food Allergy Hives   • Tuberculin, Ppd Rash     Vitals:    02/01/23 0752   BP: 110/80   BP Location: Right arm   Patient Position: Sitting   Cuff Size: Large   Pulse: 82   SpO2: 99%   Weight: 93 5 kg (206 lb 3 2 oz)   Height: 5' 5" (1 651 m)           Imaging: Echo complete w/ contrast if indicated    Result Date: 1/9/2023  Narrative: •  Left Ventricle: Left ventricular cavity size is normal  Wall thickness is mildly increased  There is mild concentric hypertrophy  The left ventricular ejection fraction is 60%   Systolic function is normal  Wall motion is normal  Diastolic function is mildly abnormal, consistent with grade I (abnormal) relaxation  •  Aortic Valve: The aortic valve is trileaflet  The leaflets are severely calcified  There is severely reduced mobility  There is critical stenosis  The aortic valve mean gradient is 53 mmHg  The dimensionless velocity index is 0 12  The aortic valve area is 0 57 cm2  •  Mitral Valve: There is mild annular calcification  Review of Systems:  Review of Systems   Constitutional: Negative  HENT: Negative  Eyes: Negative  Respiratory: Positive for chest tightness and shortness of breath  Negative for cough, choking, wheezing and stridor  Cardiovascular: Negative  Endocrine: Negative  Neurological: Positive for light-headedness  Negative for dizziness, tremors, seizures, syncope, facial asymmetry, speech difficulty, numbness and headaches  Physical Exam:  /80 (BP Location: Right arm, Patient Position: Sitting, Cuff Size: Large)   Pulse 82   Ht 5' 5" (1 651 m)   Wt 93 5 kg (206 lb 3 2 oz)   SpO2 99%   BMI 34 31 kg/m²   Physical Exam  Constitutional:       General: He is not in acute distress  Appearance: He is not ill-appearing  HENT:      Nose: No congestion or rhinorrhea  Mouth/Throat:      Mouth: Mucous membranes are moist       Pharynx: No posterior oropharyngeal erythema  Neck:      Vascular: No carotid bruit  Comments: 1+ carotid bilaterally  Cardiovascular:      Rate and Rhythm: Normal rate and regular rhythm  Heart sounds: Murmur (High-pitched aortic stenotic murmur with radiation to the carotids) heard  No gallop  Pulmonary:      Effort: Pulmonary effort is normal  No respiratory distress  Breath sounds: No wheezing or rhonchi  Abdominal:      General: There is no distension  Palpations: There is no mass  Tenderness: There is no abdominal tenderness  Hernia: No hernia is present     Musculoskeletal:         General: No swelling, tenderness, deformity or signs of injury  Normal range of motion  Cervical back: Normal range of motion  No rigidity or tenderness  Neurological:      Mental Status: He is alert  This note was completed in part utilizing m-modal fluency direct voice recognition software  Grammatical errors, random word insertion, spelling mistakes, occasional wrong word or "sound-alike" substitutions and incomplete sentences may be an occasional consequence of the system secondary to software limitations, ambient noise and hardware issues  At the time of dictation, efforts were made to edit, clarify and /or correct errors  Please read the chart carefully and recognize, using context, where substitutions have occurred  If you have any questions or concerns about the context, text or information contained within the body of this dictation, please contact myself, the provider, for further clarification

## 2023-02-01 NOTE — H&P (VIEW-ONLY)
Cardiology New Patient Visit     Emerson Gowers  [id number]  1950  HEART & VASCULAR [facility] [place]  [phone] [address]    Diagnoses and all orders for this visit:    Moderate aortic stenosis  -     Ambulatory Referral to Cardiology  -     POCT ECG    SOB (shortness of breath) on exertion  -     Ambulatory Referral to Cardiology  -     POCT ECG        I had the pleasure of seeing Emerson Gowers who presents for an overdue visit - seen in 2018 last    History of the Presenting Illness, Discussion/Summary and My Plan are as follows:::    He is a very pleasant 19-year-old gentleman who presents for a long overdue follow up of aortic stenosis now severe, originally saw me for moderate AS from July 2018      He does not have a history of hypertension, diabetes or dyslipidemia  LDL is 110  No family history of premature coronary artery disease  His dad had a heart attack at the age of 72        For evaluation of murmur, echo in 2018 reported ' severe aortic stenosis', I reviewed his images myself and did the measurements also  In 2018-(peak gradient-66, mean gradient -34, VTI Ix:  28/99= 0 28; LVOT diameter 2 4, valve area -1 2), also had mild aortic insufficiency  I personally reviewed his current echo with measurements stated below    At that time he was asymptomatic, shortness of breath only with unaccustomed exertion and in the setting of having put on 20 pounds in the preceding 1 year  Subsequently was lost to follow-up  He is here today, with shortness of breath with exertion, mild chest discomfort with exertion noticed since October 2022  In addition he feels dizzy when he stands up too quickly which is also a new symptom  Has lost a little weight recently    He also underwent an echo that now shows critical aortic stenosis       He remains mentally and physically active, 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        Plan:     Aortic stenosis:  Moderate in 2018, n now symptomatic and severe/critical aortic stenosis:   January 2023: Peak/mean gradients of 103/63,, index 0 12 (although limited by LVOT Doppler measurement), valve area 0 6, EF 60%, trace AI     CP and HURD: likely from AS, will set up for cor angio as well  Also worse with eating  Intolerant to ASA - dyspepsia  Will set him up for coronary angiography as well as give him a referral for cardiac surgery      Blood pressure is well controlled     Follow-up in 6 months     Latest Reference Range & Units 11/29/22 07:11   Cholesterol See Comment mg/dL 177   Triglycerides See Comment mg/dL 51   HDL >=40 mg/dL 48   Non-HDL Cholesterol mg/dl 129   LDL Calculated 0 - 100 mg/dL 119 (H)   (H): Data is abnormally high     Latest Reference Range & Units 11/03/21 07:28 11/29/22 07:11   BUN 5 - 25 mg/dL 17 21   Creatinine 0 60 - 1 30 mg/dL 1 15 1 23     1  Moderate aortic stenosis  Ambulatory Referral to Cardiology    POCT ECG      2  SOB (shortness of breath) on exertion  Ambulatory Referral to Cardiology    POCT ECG        Patient Active Problem List   Diagnosis   • ED (erectile dysfunction)   • Impaired fasting glucose   • Obesity   • Dyslipidemia   • Elevated PSA measurement   • Moderate aortic stenosis     Past Medical History:   Diagnosis Date   • Asthma    • Colon, diverticulosis    • Nonspecific reaction to 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 active tuberculosis     CXR HAS BEEN CLEAR     • Pneumonia      Social History     Socioeconomic History   • Marital status: /Civil Union     Spouse name: Not on file   • Number of children: Not on file   • Years of education: Not on file   • Highest education level: Not on file   Occupational History   • Not on file   Tobacco Use   • Smoking status: Never   • Smokeless tobacco: Never   Substance and Sexual Activity   • Alcohol use: No   • Drug use: No   • Sexual activity: Not on file   Other Topics Concern   • Not on file   Social History Narrative   • Not on file     Social Determinants of Health     Financial Resource Strain: Low Risk    • Difficulty of Paying Living Expenses: Not hard at all   Food Insecurity: Not on file   Transportation Needs: No Transportation Needs   • Lack of Transportation (Medical): No   • Lack of Transportation (Non-Medical): No   Physical Activity: Not on file   Stress: Not on file   Social Connections: Not on file   Intimate Partner Violence: Not on file   Housing Stability: Not on file      Family History   Problem Relation Age of Onset   • Diabetes Mother         MELLITUS   • Coronary artery disease Mother    • Coronary artery disease Father    • Stroke Father         SYNDROME     Past Surgical History:   Procedure Laterality Date   • COLONOSCOPY  11/2007    COMPLETE; DONE 11/2007 WITH NO POLYPS, +DIVERTICULA  2/14   • TONSILLECTOMY      LAST ASSESSED: 7/9/14       Current Outpatient Medications:   •  desoximetasone (TOPICORT) 0 25 % cream, Apply topically 2 (two) times a day (Patient taking differently: Apply topically 2 (two) times a day prn), Disp: 60 g, Rfl: 1  •  sildenafil (Viagra) 100 mg tablet, Take 1 tablet (100 mg total) by mouth as needed for erectile dysfunction, Disp: 10 tablet, Rfl: 5  Allergies   Allergen Reactions   • Fish-Derived Products - Food Allergy Hives   • Tuberculin, Ppd Rash     Vitals:    02/01/23 0752   BP: 110/80   BP Location: Right arm   Patient Position: Sitting   Cuff Size: Large   Pulse: 82   SpO2: 99%   Weight: 93 5 kg (206 lb 3 2 oz)   Height: 5' 5" (1 651 m)           Imaging: Echo complete w/ contrast if indicated    Result Date: 1/9/2023  Narrative: •  Left Ventricle: Left ventricular cavity size is normal  Wall thickness is mildly increased  There is mild concentric hypertrophy  The left ventricular ejection fraction is 60%   Systolic function is normal  Wall motion is normal  Diastolic function is mildly abnormal, consistent with grade I (abnormal) relaxation  •  Aortic Valve: The aortic valve is trileaflet  The leaflets are severely calcified  There is severely reduced mobility  There is critical stenosis  The aortic valve mean gradient is 53 mmHg  The dimensionless velocity index is 0 12  The aortic valve area is 0 57 cm2  •  Mitral Valve: There is mild annular calcification  Review of Systems:  Review of Systems   Constitutional: Negative  HENT: Negative  Eyes: Negative  Respiratory: Positive for chest tightness and shortness of breath  Negative for cough, choking, wheezing and stridor  Cardiovascular: Negative  Endocrine: Negative  Neurological: Positive for light-headedness  Negative for dizziness, tremors, seizures, syncope, facial asymmetry, speech difficulty, numbness and headaches  Physical Exam:  /80 (BP Location: Right arm, Patient Position: Sitting, Cuff Size: Large)   Pulse 82   Ht 5' 5" (1 651 m)   Wt 93 5 kg (206 lb 3 2 oz)   SpO2 99%   BMI 34 31 kg/m²   Physical Exam  Constitutional:       General: He is not in acute distress  Appearance: He is not ill-appearing  HENT:      Nose: No congestion or rhinorrhea  Mouth/Throat:      Mouth: Mucous membranes are moist       Pharynx: No posterior oropharyngeal erythema  Neck:      Vascular: No carotid bruit  Comments: 1+ carotid bilaterally  Cardiovascular:      Rate and Rhythm: Normal rate and regular rhythm  Heart sounds: Murmur (High-pitched aortic stenotic murmur with radiation to the carotids) heard  No gallop  Pulmonary:      Effort: Pulmonary effort is normal  No respiratory distress  Breath sounds: No wheezing or rhonchi  Abdominal:      General: There is no distension  Palpations: There is no mass  Tenderness: There is no abdominal tenderness  Hernia: No hernia is present     Musculoskeletal:         General: No swelling, tenderness, deformity or signs of injury  Normal range of motion  Cervical back: Normal range of motion  No rigidity or tenderness  Neurological:      Mental Status: He is alert  This note was completed in part utilizing m-modal fluency direct voice recognition software  Grammatical errors, random word insertion, spelling mistakes, occasional wrong word or "sound-alike" substitutions and incomplete sentences may be an occasional consequence of the system secondary to software limitations, ambient noise and hardware issues  At the time of dictation, efforts were made to edit, clarify and /or correct errors  Please read the chart carefully and recognize, using context, where substitutions have occurred  If you have any questions or concerns about the context, text or information contained within the body of this dictation, please contact myself, the provider, for further clarification

## 2023-02-04 ENCOUNTER — APPOINTMENT (OUTPATIENT)
Dept: LAB | Facility: CLINIC | Age: 73
End: 2023-02-04

## 2023-02-04 LAB
ALBUMIN SERPL BCP-MCNC: 3.7 G/DL (ref 3.5–5)
ALP SERPL-CCNC: 70 U/L (ref 46–116)
ALT SERPL W P-5'-P-CCNC: 21 U/L (ref 12–78)
ANION GAP SERPL CALCULATED.3IONS-SCNC: 5 MMOL/L (ref 4–13)
AST SERPL W P-5'-P-CCNC: 15 U/L (ref 5–45)
BASOPHILS # BLD AUTO: 0.04 THOUSANDS/ÂΜL (ref 0–0.1)
BASOPHILS NFR BLD AUTO: 1 % (ref 0–1)
BILIRUB SERPL-MCNC: 0.58 MG/DL (ref 0.2–1)
BUN SERPL-MCNC: 17 MG/DL (ref 5–25)
CALCIUM SERPL-MCNC: 8.5 MG/DL (ref 8.3–10.1)
CHLORIDE SERPL-SCNC: 106 MMOL/L (ref 96–108)
CO2 SERPL-SCNC: 27 MMOL/L (ref 21–32)
CREAT SERPL-MCNC: 1.15 MG/DL (ref 0.6–1.3)
EOSINOPHIL # BLD AUTO: 0.09 THOUSAND/ÂΜL (ref 0–0.61)
EOSINOPHIL NFR BLD AUTO: 1 % (ref 0–6)
ERYTHROCYTE [DISTWIDTH] IN BLOOD BY AUTOMATED COUNT: 12.3 % (ref 11.6–15.1)
GFR SERPL CREATININE-BSD FRML MDRD: 64 ML/MIN/1.73SQ M
GLUCOSE P FAST SERPL-MCNC: 113 MG/DL (ref 65–99)
HCT VFR BLD AUTO: 44 % (ref 36.5–49.3)
HGB BLD-MCNC: 14.9 G/DL (ref 12–17)
IMM GRANULOCYTES # BLD AUTO: 0.01 THOUSAND/UL (ref 0–0.2)
IMM GRANULOCYTES NFR BLD AUTO: 0 % (ref 0–2)
LYMPHOCYTES # BLD AUTO: 2.56 THOUSANDS/ÂΜL (ref 0.6–4.47)
LYMPHOCYTES NFR BLD AUTO: 38 % (ref 14–44)
MCH RBC QN AUTO: 32 PG (ref 26.8–34.3)
MCHC RBC AUTO-ENTMCNC: 33.9 G/DL (ref 31.4–37.4)
MCV RBC AUTO: 95 FL (ref 82–98)
MONOCYTES # BLD AUTO: 0.62 THOUSAND/ÂΜL (ref 0.17–1.22)
MONOCYTES NFR BLD AUTO: 9 % (ref 4–12)
NEUTROPHILS # BLD AUTO: 3.39 THOUSANDS/ÂΜL (ref 1.85–7.62)
NEUTS SEG NFR BLD AUTO: 51 % (ref 43–75)
NRBC BLD AUTO-RTO: 0 /100 WBCS
PLATELET # BLD AUTO: 205 THOUSANDS/UL (ref 149–390)
PMV BLD AUTO: 10.4 FL (ref 8.9–12.7)
POTASSIUM SERPL-SCNC: 3.8 MMOL/L (ref 3.5–5.3)
PROT SERPL-MCNC: 7.3 G/DL (ref 6.4–8.4)
RBC # BLD AUTO: 4.65 MILLION/UL (ref 3.88–5.62)
SODIUM SERPL-SCNC: 138 MMOL/L (ref 135–147)
WBC # BLD AUTO: 6.71 THOUSAND/UL (ref 4.31–10.16)

## 2023-02-16 PROBLEM — I35.0 CRITICAL AORTIC VALVE STENOSIS: Status: ACTIVE | Noted: 2023-02-09

## 2023-02-17 ENCOUNTER — HOSPITAL ENCOUNTER (OUTPATIENT)
Facility: HOSPITAL | Age: 73
Setting detail: OUTPATIENT SURGERY
Discharge: HOME | End: 2023-02-17
Attending: INTERNAL MEDICINE | Admitting: INTERNAL MEDICINE

## 2023-02-17 VITALS
RESPIRATION RATE: 18 BRPM | OXYGEN SATURATION: 96 % | BODY MASS INDEX: 32.62 KG/M2 | SYSTOLIC BLOOD PRESSURE: 109 MMHG | HEIGHT: 66 IN | DIASTOLIC BLOOD PRESSURE: 55 MMHG | HEART RATE: 59 BPM | WEIGHT: 203 LBS | TEMPERATURE: 97.6 F

## 2023-02-17 DIAGNOSIS — I25.10 CORONARY ARTERY DISEASE INVOLVING NATIVE CORONARY ARTERY: Primary | ICD-10-CM

## 2023-02-17 DIAGNOSIS — I35.0 SEVERE AORTIC STENOSIS BY PRIOR ECHOCARDIOGRAM: ICD-10-CM

## 2023-02-17 DIAGNOSIS — L30.9 DERMATITIS: ICD-10-CM

## 2023-02-17 LAB
ATRIAL RATE: 68 BPM
P AXIS: 3 DEGREES
PR INTERVAL: 140 MS
QRS AXIS: -19 DEGREES
QRSD INTERVAL: 88 MS
QT INTERVAL: 398 MS
QTC INTERVAL: 423 MS
T WAVE AXIS: 58 DEGREES
VENTRICULAR RATE: 68 BPM

## 2023-02-17 RX ORDER — ROSUVASTATIN CALCIUM 20 MG/1
20 TABLET, COATED ORAL DAILY
Qty: 30 TABLET | Refills: 6 | Status: SHIPPED | OUTPATIENT
Start: 2023-02-17

## 2023-02-17 RX ORDER — VERAPAMIL HYDROCHLORIDE 2.5 MG/ML
INJECTION, SOLUTION INTRAVENOUS CODE/TRAUMA/SEDATION MEDICATION
Status: DISCONTINUED | OUTPATIENT
Start: 2023-02-17 | End: 2023-02-17 | Stop reason: HOSPADM

## 2023-02-17 RX ORDER — NITROGLYCERIN 20 MG/100ML
INJECTION INTRAVENOUS CODE/TRAUMA/SEDATION MEDICATION
Status: DISCONTINUED | OUTPATIENT
Start: 2023-02-17 | End: 2023-02-17 | Stop reason: HOSPADM

## 2023-02-17 RX ORDER — ASPIRIN 81 MG/1
324 TABLET, CHEWABLE ORAL ONCE
Status: COMPLETED | OUTPATIENT
Start: 2023-02-17 | End: 2023-02-17

## 2023-02-17 RX ORDER — METOPROLOL SUCCINATE 25 MG/1
25 TABLET, EXTENDED RELEASE ORAL DAILY
Qty: 30 TABLET | Refills: 6 | Status: SHIPPED | OUTPATIENT
Start: 2023-02-17

## 2023-02-17 RX ORDER — SODIUM CHLORIDE 9 MG/ML
125 INJECTION, SOLUTION INTRAVENOUS CONTINUOUS
Status: DISCONTINUED | OUTPATIENT
Start: 2023-02-17 | End: 2023-02-17

## 2023-02-17 RX ORDER — ONDANSETRON 2 MG/ML
4 INJECTION INTRAMUSCULAR; INTRAVENOUS EVERY 8 HOURS PRN
Status: DISCONTINUED | OUTPATIENT
Start: 2023-02-17 | End: 2023-02-17 | Stop reason: HOSPADM

## 2023-02-17 RX ORDER — LIDOCAINE HYDROCHLORIDE 10 MG/ML
INJECTION, SOLUTION EPIDURAL; INFILTRATION; INTRACAUDAL; PERINEURAL CODE/TRAUMA/SEDATION MEDICATION
Status: DISCONTINUED | OUTPATIENT
Start: 2023-02-17 | End: 2023-02-17 | Stop reason: HOSPADM

## 2023-02-17 RX ORDER — ASPIRIN 81 MG/1
81 TABLET, CHEWABLE ORAL DAILY
COMMUNITY
End: 2023-02-17

## 2023-02-17 RX ORDER — ASPIRIN 81 MG/1
81 TABLET ORAL DAILY
Refills: 0
Start: 2023-02-17

## 2023-02-17 RX ORDER — FENTANYL CITRATE 50 UG/ML
INJECTION, SOLUTION INTRAMUSCULAR; INTRAVENOUS CODE/TRAUMA/SEDATION MEDICATION
Status: DISCONTINUED | OUTPATIENT
Start: 2023-02-17 | End: 2023-02-17 | Stop reason: HOSPADM

## 2023-02-17 RX ORDER — SODIUM CHLORIDE 9 MG/ML
100 INJECTION, SOLUTION INTRAVENOUS CONTINUOUS
Status: DISCONTINUED | OUTPATIENT
Start: 2023-02-17 | End: 2023-02-17 | Stop reason: HOSPADM

## 2023-02-17 RX ORDER — HEPARIN SODIUM 1000 [USP'U]/ML
INJECTION, SOLUTION INTRAVENOUS; SUBCUTANEOUS CODE/TRAUMA/SEDATION MEDICATION
Status: DISCONTINUED | OUTPATIENT
Start: 2023-02-17 | End: 2023-02-17 | Stop reason: HOSPADM

## 2023-02-17 RX ORDER — MIDAZOLAM HYDROCHLORIDE 2 MG/2ML
INJECTION, SOLUTION INTRAMUSCULAR; INTRAVENOUS CODE/TRAUMA/SEDATION MEDICATION
Status: DISCONTINUED | OUTPATIENT
Start: 2023-02-17 | End: 2023-02-17 | Stop reason: HOSPADM

## 2023-02-17 RX ORDER — ACETAMINOPHEN 325 MG/1
650 TABLET ORAL EVERY 6 HOURS PRN
Status: DISCONTINUED | OUTPATIENT
Start: 2023-02-17 | End: 2023-02-17 | Stop reason: HOSPADM

## 2023-02-17 RX ORDER — DESOXIMETASONE 2.5 MG/G
CREAM TOPICAL 2 TIMES DAILY
Start: 2023-02-17

## 2023-02-17 RX ADMIN — SODIUM CHLORIDE 125 ML/HR: 0.9 INJECTION, SOLUTION INTRAVENOUS at 07:06

## 2023-02-17 RX ADMIN — ASPIRIN 324 MG: 81 TABLET, CHEWABLE ORAL at 06:55

## 2023-02-17 NOTE — INTERVAL H&P NOTE
H&P reviewed  After examining the patient I find no changes in the patients condition since the H&P had been written      Vitals:    02/17/23 0653   BP: 117/74   Pulse: 76   Resp: 16   Temp: (!) 97 4 °F (36 3 °C)   SpO2: 98%       For pre-TAVR vs SAVR coronary angiography in setting of symptomatic critical aortic stenosis (REKHA 0 5 cm2, mean 53 mmHg, 4 5 m/s, DI 0 14)  -1/9 TTE: LVEF 60%, mild LVH, no WMA, G1 DD, normal RV size & function    ECG 02/17/23  Sinus rhythm, normal ECG, HR 68      Annmarie Candelario MD / 02/17/23 / 7:24 AM

## 2023-02-17 NOTE — DISCHARGE INSTR - AVS FIRST PAGE
1  Please see the post cardiac catheterization dishcarge instructions  No heavy lifting, greater than 10 lbs  or strenuous  activity for 48 hrs  2 Remove band aid tomorrow  Shower and wash area- wrist gently with soap and water- beginning tomorrow  Rinse and pat dry  Apply new water seal band aid  Repeat this process for 5 days  No powders, creams lotions or antibiotic ointments  for 5 days  No tub baths, hot tubs or swimming for 5 days  3  Please call our office ([phone]) if you have any fever, redness, swelling, discharge from your wrist access site      4 No driving for 1 day

## 2023-02-22 ENCOUNTER — OFFICE VISIT (OUTPATIENT)
Dept: CARDIAC SURGERY | Facility: CLINIC | Age: 73
End: 2023-02-22

## 2023-02-22 VITALS
HEIGHT: 66 IN | SYSTOLIC BLOOD PRESSURE: 125 MMHG | HEART RATE: 60 BPM | BODY MASS INDEX: 33.41 KG/M2 | OXYGEN SATURATION: 99 % | DIASTOLIC BLOOD PRESSURE: 62 MMHG | WEIGHT: 207.9 LBS | TEMPERATURE: 97.4 F

## 2023-02-22 DIAGNOSIS — R07.9 CHEST PAIN, UNSPECIFIED TYPE: ICD-10-CM

## 2023-02-22 DIAGNOSIS — Z01.810 PRE-OPERATIVE CARDIOVASCULAR EXAMINATION: ICD-10-CM

## 2023-02-22 DIAGNOSIS — Z01.812 ENCOUNTER FOR PRE-OPERATIVE LABORATORY TESTING: ICD-10-CM

## 2023-02-22 DIAGNOSIS — Z13.1 ENCOUNTER FOR SCREENING FOR DIABETES MELLITUS: ICD-10-CM

## 2023-02-22 DIAGNOSIS — I35.0 CRITICAL AORTIC VALVE STENOSIS: Primary | ICD-10-CM

## 2023-02-22 DIAGNOSIS — R06.02 SHORTNESS OF BREATH: ICD-10-CM

## 2023-02-22 DIAGNOSIS — I25.10 CORONARY ARTERY DISEASE INVOLVING NATIVE CORONARY ARTERY OF NATIVE HEART WITHOUT ANGINA PECTORIS: ICD-10-CM

## 2023-02-22 RX ORDER — CHLORHEXIDINE GLUCONATE 0.12 MG/ML
15 RINSE ORAL ONCE
OUTPATIENT
Start: 2023-02-22 | End: 2023-02-22

## 2023-02-22 RX ORDER — CEFAZOLIN SODIUM 2 G/50ML
2000 SOLUTION INTRAVENOUS ONCE
OUTPATIENT
Start: 2023-02-22 | End: 2023-02-22

## 2023-02-22 NOTE — H&P
Preop History and Physical - Cardiothoracic Surgery   Farooq Figueroa 81 y o  male MRN: [id number]    Physician Requesting Consult: No att  providers found    Reason for Consult / Principal Problem: Aortic stenosis, Non-Rheumatic, Coronary artery disease    History of Present Illness: Farooq Figueroa is a 81y o  year old male who has been referred for critical aortic stenosis and coronary artery disease  His medical history is significant for HLD, diverticulosis, elevated PSA, CAD, impaired fasting glucose, aortic stenosis  He has been following with Dr Darrian Higgins for aortic stenosis since 2018 - an ECHO at that time demonstrated moderate aortic stenosis  An ECHO in 2021 again revealed moderate AS  He was lost to cardiology follow up, but had continued routine follow up with his PCP who ordered an ECHO to follow AS, and an ECHO on 1/9/23 revealed now critical AS with peak velocity of 4 53 m/s, mean gradient of 53 mmHg and REKHA 0 6 cm2  A cardiac cath was ordered, and performed on 2/17 - demonstrated ostial LAD disease of 90%  Patient was then referred to our office to discuss his surgical options  Patient has a pertinent family history of SCD in his dad, who had MI at age 72  Patient is retired, 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[facility] [place]  He remains very active - he works on his yard, has recently vacuumed the house and cleaned his car, and had no symptoms  He endorses HURD with going up a hill - he needs to stop, rest for about 15 seconds - and then is able to continue on  He denies fatigue, lightheadedness, orthopnea, PND, LE edema, weight changes  He lives with his wife in [place], and travels frequently with her  He drives, does not use an assist device for ambulation  He is a lifelong nonsmoker, does not drink  Up to date with dental care  Had a colonoscopy in June 2022       Past Medical History:  Past Medical History:   Diagnosis Date   • Asthma    • Colon, diverticulosis    • Nonspecific reaction to 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 active tuberculosis     CXR HAS BEEN CLEAR  • Pneumonia          Past Surgical History:   Past Surgical History:   Procedure Laterality Date   • CARDIAC CATHETERIZATION  02/17/2023    Procedure: Cardiac catheterization;  Surgeon: Emily Madrid DO;  Location: [facility]; Service: Cardiology   • CARDIAC CATHETERIZATION N/A 02/17/2023    Procedure: Cardiac Coronary Angiogram;  Surgeon: Emily Madrid DO;  Location: [facility]; Service: Cardiology   • COLONOSCOPY  11/2007    COMPLETE; DONE 11/2007 WITH NO POLYPS, +DIVERTICULA  2/14   • COLONOSCOPY  06/10/2022   • TONSILLECTOMY      LAST ASSESSED: 7/9/14         Family History:  Family History   Problem Relation Age of Onset   • Diabetes Mother         MELLITUS   • Coronary artery disease Mother    • Coronary artery disease Father    • Stroke Father         SYNDROME         Social History:    Social History     Substance and Sexual Activity   Alcohol Use No     Social History     Substance and Sexual Activity   Drug Use No     Social History     Tobacco Use   Smoking Status Never   Smokeless Tobacco Never       Home Medications:   Prior to Admission medications    Medication Sig Start Date End Date Taking? Authorizing Provider   aspirin (ECOTRIN LOW STRENGTH) 81 mg EC tablet Take 1 tablet (81 mg total) by mouth daily 2/17/23   MEI Cates   desoximetasone (TOPICORT) 0 25 % cream Apply topically 2 (two) times a day prn 2/17/23   MEI Cates   metoprolol succinate (TOPROL-XL) 25 mg 24 hr tablet Take 1 tablet (25 mg total) by mouth daily 2/17/23   MEI Cates   metoprolol tartrate (LOPRESSOR) 25 mg tablet  2/17/23   Historical Provider, MD   rosuvastatin (CRESTOR) 20 MG tablet Take 1 tablet (20 mg total) by mouth daily 2/17/23   MEI Cates       Allergies:   Allergies   Allergen Reactions   • Fish-Derived Products - Food Allergy Hives   • Tuberculin, Ppd Rash       Review of Systems:  Review of Systems - History obtained from chart review and the patient  General ROS: negative  Psychological ROS: negative  Ophthalmic ROS: negative  ENT ROS: negative  Allergy and Immunology ROS: negative  Hematological and Lymphatic ROS: negative  Endocrine ROS: negative  Respiratory ROS: positive for - shortness of breath  Cardiovascular ROS: positive for - dyspnea on exertion and shortness of breath  Gastrointestinal ROS: no abdominal pain, change in bowel habits, or black or bloody stools  Genito-Urinary ROS: no dysuria, trouble voiding, or hematuria  Musculoskeletal ROS: negative  Neurological ROS: no TIA or stroke symptoms  Dermatological ROS: negative    Vital Signs:     Vitals:    02/22/23 0949 02/22/23 0952   BP: 129/63 125/62   BP Location: Left arm Right arm   Patient Position: Sitting Sitting   Cuff Size: Large Standard   Pulse: 60    Temp: (!) 97 4 °F (36 3 °C)    TempSrc: Tympanic    SpO2: 99%    Weight: 94 3 kg (207 lb 14 4 oz)    Height: 5' 6" (1 676 m)        Physical Exam:    General: alert, oriented, NAD   HEENT/NECK:  PERRL  No jugular venous distention  Cardiac:Regular rate and rhythm, grade IV/VI systolic Murmur  Carotid arteries: bruit vs radiation of cardiac murmur   Pulmonary:  Breath sounds clear bilaterally  Abdomen:  Non-tender, Non-distended  Positive bowel sounds  Upper extremities: 2+ radial pulses; brisk capillary refill  Lower extremities: Extremities warm/dry  PT/DP pulses 2+ bilaterally  No edema B/L  Neuro: Alert and oriented X 3  Sensation is grossly intact  No focal deficits  Musculoskeletal: MEJÍA   Skin: Warm/Dry, without rashes or lesions      Lab Results:               Invalid input(s): LABGLOM      Lab Results   Component Value Date    HGBA1C 5 5 11/29/2022     No results found for: CKTOTAL, CKMB, CKMBINDEX, TROPONINI    Imaging Studies:     Cardiac Catheterization: 1/9/23  Left Anterior Descending   [facility] LAD lesion is 90% stenosed  FRIEDA flow is 3  The lesion is located at the bifurcation  The lesion is calcified  Left Circumflex   There is mild diffuse disease throughout the vessel  Right Coronary Artery   There is mild diffuse disease throughout the vessel  Echocardiogram: 1/9/23  Interpretation Summary       •  Left Ventricle: Left ventricular cavity size is normal  Wall thickness is mildly increased  There is mild concentric hypertrophy  The left ventricular ejection fraction is 60%  Systolic function is normal  Wall motion is normal  Diastolic function is mildly abnormal, consistent with grade I (abnormal) relaxation  •  Aortic Valve: The aortic valve is trileaflet  The leaflets are severely calcified  There is severely reduced mobility  There is critical stenosis  The aortic valve mean gradient is 53 mmHg  The dimensionless velocity index is 0 12  The aortic valve area is 0 57 cm2  •  Mitral Valve: There is mild annular calcification      Findings    Left Ventricle Left ventricular cavity size is normal  Wall thickness is mildly increased  There is mild concentric hypertrophy  The left ventricular ejection fraction is 60%  Systolic function is normal   Wall motion is normal  Diastolic function is mildly abnormal, consistent with grade I (abnormal) relaxation  Right Ventricle Right ventricular cavity size is normal  Systolic function is normal  Wall thickness is normal    Left Atrium The atrium is normal in size  Right Atrium The atrium is normal in size  Aortic Valve The aortic valve is trileaflet  The leaflets are severely calcified  There is severely reduced mobility  There is trace regurgitation  There is critical stenosis  The aortic valve mean gradient is 53 mmHg  The dimensionless velocity index is 0 12  The aortic valve area is 0 57 cm2  Mitral Valve The leaflets exhibit normal mobility  There is mild annular calcification  There is trace regurgitation   There is no evidence of stenosis  Tricuspid Valve Tricuspid valve structure is normal  There is trace regurgitation  There is no evidence of stenosis  Pulmonic Valve Pulmonic valve structure is normal  There is trace regurgitation  There is no evidence of stenosis  Ascending Aorta The aortic root is normal in size  IVC/SVC The inferior vena cava is normal in size  Respirophasic changes were normal    Pericardium There is no pericardial effusion  The pericardium is normal in appearance       Left Ventricle Measurements    Function/Volumes   A4C EF 54 %         LVOT stroke volume 66 11 cm3         LVOT stroke volume index 34 8 ml/m2         Dimensions   LVIDd 4 9 cm         LVIDS 2 8 cm         IVSd 1 2 cm         LVPWd 1 2 cm         LVOT area 4 15 cm2         FS 43 %         Diastolic Filling   MV E' Tissue Velocity Septal 6 cm/s         E wave deceleration time 421 ms         MV Peak E Leonel 59 cm/s         MV Peak A Leonel 0 83 m/s          Report Measurements   AV LVOT peak gradient 1 mmHg              Interventricular Septum Measurements    Shunt Ratio   LVOT peak VTI 15 92 cm         LVOT peak leonel 0 53 m/s              Right Ventricle Measurements    Dimensions   RVID d 3 5 cm         Tricuspid annular plane systolic excursion 1 7 cm               Left Atrium Measurements    Dimensions   LA size 4 3 cm         LA length (A2C) 6 cm         JONATHAN A4C 18 3 cm2               Right Atrium Measurements    Dimensions   RAA A4C 14 1 cm2               Atrial Septum Measurements    Shunt Ratio   LVOT peak VTI 15 92 cm         LVOT peak leonel 0 53 m/s               Aortic Valve Measurements    Stenosis   Aortic valve peak velocity 4 53 m/s         LVOT peak leonel 0 53 m/s         Ao  07 cm         LVOT peak VTI 15 92 cm         AV mean gradient 53 mmHg         LVOT mn grad 1 mmHg         AV peak gradient 89 mmHg         AV LVOT peak gradient 1 mmHg         Area/Dimensions   DVI 0 12          AV valve area 0 57 cm2         AV area by cont VTI 0 6 cm2         AV area peak didi 0 5 cm2         LVOT diameter 2 3 cm         LVOT area 4 15 cm2               Mitral Valve Measurements    Stenosis   MV stenosis pressure 1/2 time 122 ms         MV valve area p 1/2 method 1 8 cm2               Aorta Measurements    Aortic Dimensions   Ao root 3 4 cm         Asc Ao 3 6 cm                  I have personally reviewed pertinent reports  Assessment:  Patient Active Problem List    Diagnosis Date Noted   • Coronary artery disease involving native coronary artery 02/17/2023   • Critical aortic valve stenosis 02/09/2023   • Elevated PSA measurement 07/09/2018   • Dyslipidemia 07/03/2018   • Obesity 09/02/2015   • ED (erectile dysfunction) 07/09/2014   • Impaired fasting glucose 08/17/2012     Severe coronary artery disease; Ongoing CABG workup  Severe aortic stenosis; Ongoing AVR workup    Plan:  Risks and benefits of aortic valve replacement and coronary artery bypass grafting were discussed in detail today with the patient  They understand and wish to proceed with further workup and ultimately surgical intervention  We have ordered routine preoperative laboratory and vascular studies, including carotid artery ultrasound and CT chest without contrast   Pending the results of these tests, they will be scheduled for surgery on Monday March 6, 2023 with VERE[facility] was comfortable with our recommendations, and their questions were answered to their satisfaction  Thank you for allowing us to participate in the care of this patient  The patient recently had a screening colonoscopy in June 2022  Therefore GI referral is not indicated at this time       SIGNATURE: Meseret Lomeli PA-C  DATE: February 22, 2023  TIME: 10:36 AM

## 2023-02-22 NOTE — LETTER
February 22, 2023     Benja ReGalo [address]    Patient: Evelyn Davison   YOB: 1950   Date of Visit: 2/22/2023       Dear Dr Marcy Blake:    Thank you for referring Evelyn Davison to me for evaluation  Below are my notes for this consultation  If you have questions, please do not hesitate to call me  I look forward to following your patient along with you  Sincerely,        Teresita Valdes,         CC: MD Patti Addison PA-C  2/22/2023 11:06 AM  Attested  Consultation - Cardiothoracic Surgery   Evelyn Davison 81 y o  male MRN: [id number]    Physician Requesting Consult: No att  providers found    Reason for Consult / Principal Problem: Aortic stenosis, Non-Rheumatic, Coronary artery disease    History of Present Illness: Evelyn Davison is a 81y o  year old male who has been referred for critical aortic stenosis and coronary artery disease  His medical history is significant for HLD, diverticulosis, elevated PSA, CAD, impaired fasting glucose, aortic stenosis  He has been following with Dr Marcy Blake for aortic stenosis since 2018 - an ECHO at that time demonstrated moderate aortic stenosis  An ECHO in 2021 again revealed moderate AS  He was lost to cardiology follow up, but had continued routine follow up with his PCP who ordered an ECHO to follow AS, and an ECHO on 1/9/23 revealed now critical AS with peak velocity of 4 53 m/s, mean gradient of 53 mmHg and REKHA 0 6 cm2  A cardiac cath was ordered, and performed on 2/17 - demonstrated ostial LAD disease of 90%  Patient was then referred to our office to discuss his surgical options  Patient has a pertinent family history of SCD in his dad, who had MI at age 72  Patient is retired, 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[facility]   He remains very active - he works on his yard, has recently vacuumed the house and cleaned his car, and had no symptoms  He endorses HURD with going up a hill - he needs to stop, rest for about 15 seconds - and then is able to continue on  He denies fatigue, lightheadedness, orthopnea, PND, LE edema, weight changes  He lives with his wife in [place], and travels frequently with her  He drives, does not use an assist device for ambulation  He is a lifelong nonsmoker, does not drink  Up to date with dental care  Had a colonoscopy in June 2022  Past Medical History:  Past Medical History:   Diagnosis Date   • Asthma    • Colon, diverticulosis    • Nonspecific reaction to 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 active tuberculosis     CXR HAS BEEN CLEAR  • Pneumonia          Past Surgical History:   Past Surgical History:   Procedure Laterality Date   • CARDIAC CATHETERIZATION  02/17/2023    Procedure: Cardiac catheterization;  Surgeon: Antonieta Layton DO;  Location: [facility]; Service: Cardiology   • CARDIAC CATHETERIZATION N/A 02/17/2023    Procedure: Cardiac Coronary Angiogram;  Surgeon: Antonieta Layton DO;  Location: [facility]; Service: Cardiology   • COLONOSCOPY  11/2007    COMPLETE; DONE 11/2007 WITH NO POLYPS, +DIVERTICULA  2/14   • COLONOSCOPY  06/10/2022   • TONSILLECTOMY      LAST ASSESSED: 7/9/14         Family History:  Family History   Problem Relation Age of Onset   • Diabetes Mother         MELLITUS   • Coronary artery disease Mother    • Coronary artery disease Father    • Stroke Father         SYNDROME         Social History:    Social History     Substance and Sexual Activity   Alcohol Use No     Social History     Substance and Sexual Activity   Drug Use No     Social History     Tobacco Use   Smoking Status Never   Smokeless Tobacco Never       Home Medications:   Prior to Admission medications    Medication Sig Start Date End Date Taking?  Authorizing Provider   aspirin (ECOTRIN LOW STRENGTH) 81 mg EC tablet Take 1 tablet (81 mg total) by mouth daily 2/17/23   MEI Thompson desoximetasone (TOPICORT) 0 25 % cream Apply topically 2 (two) times a day prn 2/17/23   MEI Viera   metoprolol succinate (TOPROL-XL) 25 mg 24 hr tablet Take 1 tablet (25 mg total) by mouth daily 2/17/23   MEI Viera   metoprolol tartrate (LOPRESSOR) 25 mg tablet  2/17/23   Historical Provider, MD   rosuvastatin (CRESTOR) 20 MG tablet Take 1 tablet (20 mg total) by mouth daily 2/17/23   MEI Viera       Allergies: Allergies   Allergen Reactions   • Fish-Derived Products - Food Allergy Hives   • Tuberculin, Ppd Rash       Review of Systems:  Review of Systems - History obtained from chart review and the patient  General ROS: negative  Psychological ROS: negative  Ophthalmic ROS: negative  ENT ROS: negative  Allergy and Immunology ROS: negative  Hematological and Lymphatic ROS: negative  Endocrine ROS: negative  Respiratory ROS: positive for - shortness of breath  Cardiovascular ROS: positive for - dyspnea on exertion and shortness of breath  Gastrointestinal ROS: no abdominal pain, change in bowel habits, or black or bloody stools  Genito-Urinary ROS: no dysuria, trouble voiding, or hematuria  Musculoskeletal ROS: negative  Neurological ROS: no TIA or stroke symptoms  Dermatological ROS: negative    Vital Signs:     Vitals:    02/22/23 0949 02/22/23 0952   BP: 129/63 125/62   BP Location: Left arm Right arm   Patient Position: Sitting Sitting   Cuff Size: Large Standard   Pulse: 60    Temp: (!) 97 4 °F (36 3 °C)    TempSrc: Tympanic    SpO2: 99%    Weight: 94 3 kg (207 lb 14 4 oz)    Height: 5' 6" (1 676 m)        Physical Exam:    General: alert, oriented, NAD   HEENT/NECK:  PERRL  No jugular venous distention  Cardiac:Regular rate and rhythm, grade IV/VI systolic Murmur  Carotid arteries: bruit vs radiation of cardiac murmur   Pulmonary:  Breath sounds clear bilaterally  Abdomen:  Non-tender, Non-distended  Positive bowel sounds     Upper extremities: 2+ radial pulses; brisk capillary refill  Lower extremities: Extremities warm/dry  PT/DP pulses 2+ bilaterally  No edema B/L  Neuro: Alert and oriented X 3  Sensation is grossly intact  No focal deficits  Musculoskeletal: MEJÍA   Skin: Warm/Dry, without rashes or lesions  Lab Results:               Invalid input(s): LABGLOM      Lab Results   Component Value Date    HGBA1C 5 5 11/29/2022     No results found for: CKTOTAL, CKMB, CKMBINDEX, TROPONINI    Imaging Studies:     Cardiac Catheterization: 1/9/23  Left Anterior Descending   Ost LAD lesion is 90% stenosed  FRIEDA flow is 3  The lesion is located at the bifurcation  The lesion is calcified  Left Circumflex   There is mild diffuse disease throughout the vessel  Right Coronary Artery   There is mild diffuse disease throughout the vessel  Echocardiogram: 1/9/23  Interpretation Summary       •  Left Ventricle: Left ventricular cavity size is normal  Wall thickness is mildly increased  There is mild concentric hypertrophy  The left ventricular ejection fraction is 60%  Systolic function is normal  Wall motion is normal  Diastolic function is mildly abnormal, consistent with grade I (abnormal) relaxation  •  Aortic Valve: The aortic valve is trileaflet  The leaflets are severely calcified  There is severely reduced mobility  There is critical stenosis  The aortic valve mean gradient is 53 mmHg  The dimensionless velocity index is 0 12  The aortic valve area is 0 57 cm2  •  Mitral Valve: There is mild annular calcification      Findings    Left Ventricle Left ventricular cavity size is normal  Wall thickness is mildly increased  There is mild concentric hypertrophy  The left ventricular ejection fraction is 60%  Systolic function is normal   Wall motion is normal  Diastolic function is mildly abnormal, consistent with grade I (abnormal) relaxation     Right Ventricle Right ventricular cavity size is normal  Systolic function is normal  Wall thickness is normal  Left Atrium The atrium is normal in size  Right Atrium The atrium is normal in size  Aortic Valve The aortic valve is trileaflet  The leaflets are severely calcified  There is severely reduced mobility  There is trace regurgitation  There is critical stenosis  The aortic valve mean gradient is 53 mmHg  The dimensionless velocity index is 0 12  The aortic valve area is 0 57 cm2  Mitral Valve The leaflets exhibit normal mobility  There is mild annular calcification  There is trace regurgitation  There is no evidence of stenosis  Tricuspid Valve Tricuspid valve structure is normal  There is trace regurgitation  There is no evidence of stenosis  Pulmonic Valve Pulmonic valve structure is normal  There is trace regurgitation  There is no evidence of stenosis  Ascending Aorta The aortic root is normal in size  IVC/SVC The inferior vena cava is normal in size  Respirophasic changes were normal    Pericardium There is no pericardial effusion  The pericardium is normal in appearance       Left Ventricle Measurements    Function/Volumes   A4C EF 54 %         LVOT stroke volume 66 11 cm3         LVOT stroke volume index 34 8 ml/m2         Dimensions   LVIDd 4 9 cm         LVIDS 2 8 cm         IVSd 1 2 cm         LVPWd 1 2 cm         LVOT area 4 15 cm2         FS 43 %         Diastolic Filling   MV E' Tissue Velocity Septal 6 cm/s         E wave deceleration time 421 ms         MV Peak E Leoenl 59 cm/s         MV Peak A Leonel 0 83 m/s          Report Measurements   AV LVOT peak gradient 1 mmHg              Interventricular Septum Measurements    Shunt Ratio   LVOT peak VTI 15 92 cm         LVOT peak leonel 0 53 m/s              Right Ventricle Measurements    Dimensions   RVID d 3 5 cm         Tricuspid annular plane systolic excursion 1 7 cm               Left Atrium Measurements    Dimensions   LA size 4 3 cm         LA length (A2C) 6 cm         JONATHAN A4C 18 3 cm2               Right Atrium Measurements    Dimensions RAA A4C 14 1 cm2               Atrial Septum Measurements    Shunt Ratio   LVOT peak VTI 15 92 cm         LVOT peak didi 0 53 m/s               Aortic Valve Measurements    Stenosis   Aortic valve peak velocity 4 53 m/s         LVOT peak didi 0 53 m/s         Ao  07 cm         LVOT peak VTI 15 92 cm         AV mean gradient 53 mmHg         LVOT mn grad 1 mmHg         AV peak gradient 89 mmHg         AV LVOT peak gradient 1 mmHg         Area/Dimensions   DVI 0 12          AV valve area 0 57 cm2         AV area by cont VTI 0 6 cm2         AV area peak didi 0 5 cm2         LVOT diameter 2 3 cm         LVOT area 4 15 cm2               Mitral Valve Measurements    Stenosis   MV stenosis pressure 1/2 time 122 ms         MV valve area p 1/2 method 1 8 cm2               Aorta Measurements    Aortic Dimensions   Ao root 3 4 cm         Asc Ao 3 6 cm                  I have personally reviewed pertinent reports  Assessment:  Patient Active Problem List    Diagnosis Date Noted   • Coronary artery disease involving native coronary artery 02/17/2023   • Critical aortic valve stenosis 02/09/2023   • Elevated PSA measurement 07/09/2018   • Dyslipidemia 07/03/2018   • Obesity 09/02/2015   • ED (erectile dysfunction) 07/09/2014   • Impaired fasting glucose 08/17/2012     Severe coronary artery disease; Ongoing CABG workup  Severe aortic stenosis; Ongoing AVR workup    Plan:  Risks and benefits of aortic valve replacement and coronary artery bypass grafting were discussed in detail today with the patient  They understand and wish to proceed with further workup and ultimately surgical intervention  We have ordered routine preoperative laboratory and vascular studies, including carotid artery ultrasound and CT chest without contrast   Pending the results of these tests, they will be scheduled for surgery on Monday March 6, 2023 with VERENA [facility] [employer] was comfortable with our recommendations, and their questions were answered to their satisfaction  Thank you for allowing us to participate in the care of this patient  The patient recently had a screening colonoscopy in June 2022  Therefore GI referral is not indicated at this time  SIGNATURE: Varsha Badillo PA-C  DATE: February 22, 2023  TIME: 10:36 AM  Attestation signed by Gaby Teran DO at 2/22/2023 12:49 PM:  I supervised the Advanced Practitioner  ? I performed, in its entirety, the assessment/plan component of the visit  I agree with the Advanced Practitioner's note with the following additions/exceptions:      Mr Molly Garcia was seen in the office for his aortic stenosis and proximal LAD stenosis  His echocardiogram as well as his cardiac catheterization of been reviewed by myself personally and findings shared with him  This demonstrates severe aortic stenosis with associated proximal LAD stenosis  We discussed treatment options and I have recommended a tissue aortic valve replacement with CABG x1 with LIMA to LAD  The risks, benefits, and alternatives of the surgery been discussed with him  He consents and is willing to proceed with surgery      Gaby Teran DO 02/22/23

## 2023-02-22 NOTE — PROGRESS NOTES
Consultation - Cardiothoracic Surgery   Nereida Villegas 81 y o  male MRN: [id number]    Physician Requesting Consult: No att  providers found    Reason for Consult / Principal Problem: Aortic stenosis, Non-Rheumatic, Coronary artery disease    History of Present Illness: Nereida Villegas is a 81y o  year old male who has been referred for critical aortic stenosis and coronary artery disease  His medical history is significant for HLD, diverticulosis, elevated PSA, CAD, impaired fasting glucose, aortic stenosis  He has been following with Dr Inez Rao for aortic stenosis since 2018 - an ECHO at that time demonstrated moderate aortic stenosis  An ECHO in 2021 again revealed moderate AS  He was lost to cardiology follow up, but had continued routine follow up with his PCP who ordered an ECHO to follow AS, and an ECHO on 1/9/23 revealed now critical AS with peak velocity of 4 53 m/s, mean gradient of 53 mmHg and REKHA 0 6 cm2  A cardiac cath was ordered, and performed on 2/17 - demonstrated ostial LAD disease of 90%  Patient was then referred to our office to discuss his surgical options  Patient has a pertinent family history of SCD in his dad, who had MI at age 72  Patient is retired, 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 [place]  He remains very active - he works on his yard, has recently vacuumed the house and cleaned his car, and had no symptoms  He endorses HURD with going up a hill - he needs to stop, rest for about 15 seconds - and then is able to continue on  He denies fatigue, lightheadedness, orthopnea, PND, LE edema, weight changes  He lives with his wife in [place], and travels frequently with her  He drives, does not use an assist device for ambulation  He is a lifelong nonsmoker, does not drink  Up to date with dental care  Had a colonoscopy in June 2022       Past Medical History:  Past Medical History:   Diagnosis Date   • Asthma    • Colon, diverticulosis • Nonspecific reaction to 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 active tuberculosis     CXR HAS BEEN CLEAR  • Pneumonia          Past Surgical History:   Past Surgical History:   Procedure Laterality Date   • CARDIAC CATHETERIZATION  02/17/2023    Procedure: Cardiac catheterization;  Surgeon: Nicholas Reed DO;  [facility]; Service: Cardiology   • CARDIAC CATHETERIZATION N/A 02/17/2023    Procedure: Cardiac Coronary Angiogram;  Surgeon: Nicholas Reed DO;  [facility]; Service: Cardiology   • COLONOSCOPY  11/2007    COMPLETE; DONE 11/2007 WITH NO POLYPS, +DIVERTICULA  2/14   • COLONOSCOPY  06/10/2022   • TONSILLECTOMY      LAST ASSESSED: 7/9/14         Family History:  Family History   Problem Relation Age of Onset   • Diabetes Mother         MELLITUS   • Coronary artery disease Mother    • Coronary artery disease Father    • Stroke Father         SYNDROME         Social History:    Social History     Substance and Sexual Activity   Alcohol Use No     Social History     Substance and Sexual Activity   Drug Use No     Social History     Tobacco Use   Smoking Status Never   Smokeless Tobacco Never       Home Medications:   Prior to Admission medications    Medication Sig Start Date End Date Taking? Authorizing Provider   aspirin (ECOTRIN LOW STRENGTH) 81 mg EC tablet Take 1 tablet (81 mg total) by mouth daily 2/17/23   MEI Pagan   desoximetasone (TOPICORT) 0 25 % cream Apply topically 2 (two) times a day prn 2/17/23   MEI Pagan   metoprolol succinate (TOPROL-XL) 25 mg 24 hr tablet Take 1 tablet (25 mg total) by mouth daily 2/17/23   MEI Pagan   metoprolol tartrate (LOPRESSOR) 25 mg tablet  2/17/23   Historical Provider, MD   rosuvastatin (CRESTOR) 20 MG tablet Take 1 tablet (20 mg total) by mouth daily 2/17/23   MEI Pagan       Allergies:   Allergies   Allergen Reactions   • Fish-Derived Products - Food Allergy Hives   • Tuberculin, Ppd Rash       Review of Systems:  Review of Systems - History obtained from chart review and the patient  General ROS: negative  Psychological ROS: negative  Ophthalmic ROS: negative  ENT ROS: negative  Allergy and Immunology ROS: negative  Hematological and Lymphatic ROS: negative  Endocrine ROS: negative  Respiratory ROS: positive for - shortness of breath  Cardiovascular ROS: positive for - dyspnea on exertion and shortness of breath  Gastrointestinal ROS: no abdominal pain, change in bowel habits, or black or bloody stools  Genito-Urinary ROS: no dysuria, trouble voiding, or hematuria  Musculoskeletal ROS: negative  Neurological ROS: no TIA or stroke symptoms  Dermatological ROS: negative    Vital Signs:     Vitals:    02/22/23 0949 02/22/23 0952   BP: 129/63 125/62   BP Location: Left arm Right arm   Patient Position: Sitting Sitting   Cuff Size: Large Standard   Pulse: 60    Temp: (!) 97 4 °F (36 3 °C)    TempSrc: Tympanic    SpO2: 99%    Weight: 94 3 kg (207 lb 14 4 oz)    Height: 5' 6" (1 676 m)        Physical Exam:    General: alert, oriented, NAD   HEENT/NECK:  PERRL  No jugular venous distention  Cardiac:Regular rate and rhythm, grade IV/VI systolic Murmur  Carotid arteries: bruit vs radiation of cardiac murmur   Pulmonary:  Breath sounds clear bilaterally  Abdomen:  Non-tender, Non-distended  Positive bowel sounds  Upper extremities: 2+ radial pulses; brisk capillary refill  Lower extremities: Extremities warm/dry  PT/DP pulses 2+ bilaterally  No edema B/L  Neuro: Alert and oriented X 3  Sensation is grossly intact  No focal deficits  Musculoskeletal: MEJÍA   Skin: Warm/Dry, without rashes or lesions      Lab Results:               Invalid input(s): LABGLOM      Lab Results   Component Value Date    HGBA1C 5 5 11/29/2022     No results found for: CKTOTAL, CKMB, CKMBINDEX, TROPONINI    Imaging Studies:     Cardiac Catheterization: 1/9/23  Left Anterior Descending   Ost LAD lesion is 90% stenosed  FRIEDA flow is 3  The lesion is located at the bifurcation  The lesion is calcified  Left Circumflex   There is mild diffuse disease throughout the vessel  Right Coronary Artery   There is mild diffuse disease throughout the vessel  Echocardiogram: 1/9/23  Interpretation Summary       •  Left Ventricle: Left ventricular cavity size is normal  Wall thickness is mildly increased  There is mild concentric hypertrophy  The left ventricular ejection fraction is 60%  Systolic function is normal  Wall motion is normal  Diastolic function is mildly abnormal, consistent with grade I (abnormal) relaxation  •  Aortic Valve: The aortic valve is trileaflet  The leaflets are severely calcified  There is severely reduced mobility  There is critical stenosis  The aortic valve mean gradient is 53 mmHg  The dimensionless velocity index is 0 12  The aortic valve area is 0 57 cm2  •  Mitral Valve: There is mild annular calcification      Findings    Left Ventricle Left ventricular cavity size is normal  Wall thickness is mildly increased  There is mild concentric hypertrophy  The left ventricular ejection fraction is 60%  Systolic function is normal   Wall motion is normal  Diastolic function is mildly abnormal, consistent with grade I (abnormal) relaxation  Right Ventricle Right ventricular cavity size is normal  Systolic function is normal  Wall thickness is normal    Left Atrium The atrium is normal in size  Right Atrium The atrium is normal in size  Aortic Valve The aortic valve is trileaflet  The leaflets are severely calcified  There is severely reduced mobility  There is trace regurgitation  There is critical stenosis  The aortic valve mean gradient is 53 mmHg  The dimensionless velocity index is 0 12  The aortic valve area is 0 57 cm2  Mitral Valve The leaflets exhibit normal mobility  There is mild annular calcification  There is trace regurgitation   There is no evidence of stenosis  Tricuspid Valve Tricuspid valve structure is normal  There is trace regurgitation  There is no evidence of stenosis  Pulmonic Valve Pulmonic valve structure is normal  There is trace regurgitation  There is no evidence of stenosis  Ascending Aorta The aortic root is normal in size  IVC/SVC The inferior vena cava is normal in size  Respirophasic changes were normal    Pericardium There is no pericardial effusion  The pericardium is normal in appearance       Left Ventricle Measurements    Function/Volumes   A4C EF 54 %         LVOT stroke volume 66 11 cm3         LVOT stroke volume index 34 8 ml/m2         Dimensions   LVIDd 4 9 cm         LVIDS 2 8 cm         IVSd 1 2 cm         LVPWd 1 2 cm         LVOT area 4 15 cm2         FS 43 %         Diastolic Filling   MV E' Tissue Velocity Septal 6 cm/s         E wave deceleration time 421 ms         MV Peak E Leonel 59 cm/s         MV Peak A Leonel 0 83 m/s          Report Measurements   AV LVOT peak gradient 1 mmHg              Interventricular Septum Measurements    Shunt Ratio   LVOT peak VTI 15 92 cm         LVOT peak leonel 0 53 m/s              Right Ventricle Measurements    Dimensions   RVID d 3 5 cm         Tricuspid annular plane systolic excursion 1 7 cm               Left Atrium Measurements    Dimensions   LA size 4 3 cm         LA length (A2C) 6 cm         JONATHAN A4C 18 3 cm2               Right Atrium Measurements    Dimensions   RAA A4C 14 1 cm2               Atrial Septum Measurements    Shunt Ratio   LVOT peak VTI 15 92 cm         LVOT peak leonel 0 53 m/s               Aortic Valve Measurements    Stenosis   Aortic valve peak velocity 4 53 m/s         LVOT peak leonel 0 53 m/s         Ao  07 cm         LVOT peak VTI 15 92 cm         AV mean gradient 53 mmHg         LVOT mn grad 1 mmHg         AV peak gradient 89 mmHg         AV LVOT peak gradient 1 mmHg         Area/Dimensions   DVI 0 12          AV valve area 0 57 cm2         AV area by cont VTI 0 6 cm2         AV area peak didi 0 5 cm2         LVOT diameter 2 3 cm         LVOT area 4 15 cm2               Mitral Valve Measurements    Stenosis   MV stenosis pressure 1/2 time 122 ms         MV valve area p 1/2 method 1 8 cm2               Aorta Measurements    Aortic Dimensions   Ao root 3 4 cm         Asc Ao 3 6 cm                  I have personally reviewed pertinent reports  Assessment:  Patient Active Problem List    Diagnosis Date Noted   • Coronary artery disease involving native coronary artery 02/17/2023   • Critical aortic valve stenosis 02/09/2023   • Elevated PSA measurement 07/09/2018   • Dyslipidemia 07/03/2018   • Obesity 09/02/2015   • ED (erectile dysfunction) 07/09/2014   • Impaired fasting glucose 08/17/2012     Severe coronary artery disease; Ongoing CABG workup  Severe aortic stenosis; Ongoing AVR workup    Plan:  Risks and benefits of aortic valve replacement and coronary artery bypass grafting were discussed in detail today with the patient  They understand and wish to proceed with further workup and ultimately surgical intervention  We have ordered routine preoperative laboratory and vascular studies, including carotid artery ultrasound and CT chest without contrast   Pending the results of these tests, they will be scheduled for surgery on Monday March 6, 2023 with VERENA Macario was comfortable with our recommendations, and their questions were answered to their satisfaction  Thank you for allowing us to participate in the care of this patient  The patient recently had a screening colonoscopy in June 2022  Therefore GI referral is not indicated at this time       SIGNATURE: Anibal Hunt PA-C  DATE: February 22, 2023  TIME: 10:36 AM

## 2023-02-22 NOTE — H&P (VIEW-ONLY)
Consultation - Cardiothoracic Surgery   Cristo Dolan 81 y o  male MRN: [id number]    Physician Requesting Consult: No att  providers found    Reason for Consult / Principal Problem: Aortic stenosis, Non-Rheumatic, Coronary artery disease    History of Present Illness: Cristo Dolan is a 81y o  year old male who has been referred for critical aortic stenosis and coronary artery disease  His medical history is significant for HLD, diverticulosis, elevated PSA, CAD, impaired fasting glucose, aortic stenosis  He has been following with Dr Rhea Yung for aortic stenosis since 2018 - an ECHO at that time demonstrated moderate aortic stenosis  An ECHO in 2021 again revealed moderate AS  He was lost to cardiology follow up, but had continued routine follow up with his PCP who ordered an ECHO to follow AS, and an ECHO on 1/9/23 revealed now critical AS with peak velocity of 4 53 m/s, mean gradient of 53 mmHg and REKHA 0 6 cm2  A cardiac cath was ordered, and performed on 2/17 - demonstrated ostial LAD disease of 90%  Patient was then referred to our office to discuss his surgical options  Patient has a pertinent family history of SCD in his dad, who had MI at age 72  Patient is retired, 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[facility]  He remains very active - he works on his yard, has recently vacuumed the house and cleaned his car, and had no symptoms  He endorses HURD with going up a hill - he needs to stop, rest for about 15 seconds - and then is able to continue on  He denies fatigue, lightheadedness, orthopnea, PND, LE edema, weight changes  He lives with his wife in [facility], and travels frequently with her  He drives, does not use an assist device for ambulation  He is a lifelong nonsmoker, does not drink  Up to date with dental care  Had a colonoscopy in June 2022       Past Medical History:  Past Medical History:   Diagnosis Date   • Asthma    • Colon, diverticulosis • Nonspecific reaction to 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 active tuberculosis     CXR HAS BEEN CLEAR  • Pneumonia          Past Surgical History:   Past Surgical History:   Procedure Laterality Date   • CARDIAC CATHETERIZATION  02/17/2023    Procedure: Cardiac catheterization;  Surgeon: Tramaine Brown DO;  [facility]; Service: Cardiology   • CARDIAC CATHETERIZATION N/A 02/17/2023    Procedure: Cardiac Coronary Angiogram;  Surgeon: Tramaine Brown DO;  [facility]; Service: Cardiology   • COLONOSCOPY  11/2007    COMPLETE; DONE 11/2007 WITH NO POLYPS, +DIVERTICULA  2/14   • COLONOSCOPY  06/10/2022   • TONSILLECTOMY      LAST ASSESSED: 7/9/14         Family History:  Family History   Problem Relation Age of Onset   • Diabetes Mother         MELLITUS   • Coronary artery disease Mother    • Coronary artery disease Father    • Stroke Father         SYNDROME         Social History:    Social History     Substance and Sexual Activity   Alcohol Use No     Social History     Substance and Sexual Activity   Drug Use No     Social History     Tobacco Use   Smoking Status Never   Smokeless Tobacco Never       Home Medications:   Prior to Admission medications    Medication Sig Start Date End Date Taking? Authorizing Provider   aspirin (ECOTRIN LOW STRENGTH) 81 mg EC tablet Take 1 tablet (81 mg total) by mouth daily 2/17/23   MEI Low   desoximetasone (TOPICORT) 0 25 % cream Apply topically 2 (two) times a day prn 2/17/23   MEI Low   metoprolol succinate (TOPROL-XL) 25 mg 24 hr tablet Take 1 tablet (25 mg total) by mouth daily 2/17/23   MEI Low   metoprolol tartrate (LOPRESSOR) 25 mg tablet  2/17/23   Historical Provider, MD   rosuvastatin (CRESTOR) 20 MG tablet Take 1 tablet (20 mg total) by mouth daily 2/17/23   MEI Low       Allergies:   Allergies   Allergen Reactions   • Fish-Derived Products - Food Allergy Hives   • Tuberculin, Ppd Rash       Review of Systems:  Review of Systems - History obtained from chart review and the patient  General ROS: negative  Psychological ROS: negative  Ophthalmic ROS: negative  ENT ROS: negative  Allergy and Immunology ROS: negative  Hematological and Lymphatic ROS: negative  Endocrine ROS: negative  Respiratory ROS: positive for - shortness of breath  Cardiovascular ROS: positive for - dyspnea on exertion and shortness of breath  Gastrointestinal ROS: no abdominal pain, change in bowel habits, or black or bloody stools  Genito-Urinary ROS: no dysuria, trouble voiding, or hematuria  Musculoskeletal ROS: negative  Neurological ROS: no TIA or stroke symptoms  Dermatological ROS: negative    Vital Signs:     Vitals:    02/22/23 0949 02/22/23 0952   BP: 129/63 125/62   BP Location: Left arm Right arm   Patient Position: Sitting Sitting   Cuff Size: Large Standard   Pulse: 60    Temp: (!) 97 4 °F (36 3 °C)    TempSrc: Tympanic    SpO2: 99%    Weight: 94 3 kg (207 lb 14 4 oz)    Height: 5' 6" (1 676 m)        Physical Exam:    General: alert, oriented, NAD   HEENT/NECK:  PERRL  No jugular venous distention  Cardiac:Regular rate and rhythm, grade IV/VI systolic Murmur  Carotid arteries: bruit vs radiation of cardiac murmur   Pulmonary:  Breath sounds clear bilaterally  Abdomen:  Non-tender, Non-distended  Positive bowel sounds  Upper extremities: 2+ radial pulses; brisk capillary refill  Lower extremities: Extremities warm/dry  PT/DP pulses 2+ bilaterally  No edema B/L  Neuro: Alert and oriented X 3  Sensation is grossly intact  No focal deficits  Musculoskeletal: MEJÍA   Skin: Warm/Dry, without rashes or lesions      Lab Results:               Invalid input(s): LABGLOM      Lab Results   Component Value Date    HGBA1C 5 5 11/29/2022     No results found for: CKTOTAL, CKMB, CKMBINDEX, TROPONINI    Imaging Studies:     Cardiac Catheterization: 1/9/23  Left Anterior Descending   Ost LAD lesion is 90% stenosed  FRIEDA flow is 3  The lesion is located at the bifurcation  The lesion is calcified  Left Circumflex   There is mild diffuse disease throughout the vessel  Right Coronary Artery   There is mild diffuse disease throughout the vessel  Echocardiogram: 1/9/23  Interpretation Summary       •  Left Ventricle: Left ventricular cavity size is normal  Wall thickness is mildly increased  There is mild concentric hypertrophy  The left ventricular ejection fraction is 60%  Systolic function is normal  Wall motion is normal  Diastolic function is mildly abnormal, consistent with grade I (abnormal) relaxation  •  Aortic Valve: The aortic valve is trileaflet  The leaflets are severely calcified  There is severely reduced mobility  There is critical stenosis  The aortic valve mean gradient is 53 mmHg  The dimensionless velocity index is 0 12  The aortic valve area is 0 57 cm2  •  Mitral Valve: There is mild annular calcification      Findings    Left Ventricle Left ventricular cavity size is normal  Wall thickness is mildly increased  There is mild concentric hypertrophy  The left ventricular ejection fraction is 60%  Systolic function is normal   Wall motion is normal  Diastolic function is mildly abnormal, consistent with grade I (abnormal) relaxation  Right Ventricle Right ventricular cavity size is normal  Systolic function is normal  Wall thickness is normal    Left Atrium The atrium is normal in size  Right Atrium The atrium is normal in size  Aortic Valve The aortic valve is trileaflet  The leaflets are severely calcified  There is severely reduced mobility  There is trace regurgitation  There is critical stenosis  The aortic valve mean gradient is 53 mmHg  The dimensionless velocity index is 0 12  The aortic valve area is 0 57 cm2  Mitral Valve The leaflets exhibit normal mobility  There is mild annular calcification  There is trace regurgitation   There is no evidence of stenosis  Tricuspid Valve Tricuspid valve structure is normal  There is trace regurgitation  There is no evidence of stenosis  Pulmonic Valve Pulmonic valve structure is normal  There is trace regurgitation  There is no evidence of stenosis  Ascending Aorta The aortic root is normal in size  IVC/SVC The inferior vena cava is normal in size  Respirophasic changes were normal    Pericardium There is no pericardial effusion  The pericardium is normal in appearance       Left Ventricle Measurements    Function/Volumes   A4C EF 54 %         LVOT stroke volume 66 11 cm3         LVOT stroke volume index 34 8 ml/m2         Dimensions   LVIDd 4 9 cm         LVIDS 2 8 cm         IVSd 1 2 cm         LVPWd 1 2 cm         LVOT area 4 15 cm2         FS 43 %         Diastolic Filling   MV E' Tissue Velocity Septal 6 cm/s         E wave deceleration time 421 ms         MV Peak E Leonel 59 cm/s         MV Peak A Leonel 0 83 m/s          Report Measurements   AV LVOT peak gradient 1 mmHg              Interventricular Septum Measurements    Shunt Ratio   LVOT peak VTI 15 92 cm         LVOT peak leonel 0 53 m/s              Right Ventricle Measurements    Dimensions   RVID d 3 5 cm         Tricuspid annular plane systolic excursion 1 7 cm               Left Atrium Measurements    Dimensions   LA size 4 3 cm         LA length (A2C) 6 cm         JONATHAN A4C 18 3 cm2               Right Atrium Measurements    Dimensions   RAA A4C 14 1 cm2               Atrial Septum Measurements    Shunt Ratio   LVOT peak VTI 15 92 cm         LVOT peak leonel 0 53 m/s               Aortic Valve Measurements    Stenosis   Aortic valve peak velocity 4 53 m/s         LVOT peak leonel 0 53 m/s         Ao  07 cm         LVOT peak VTI 15 92 cm         AV mean gradient 53 mmHg         LVOT mn grad 1 mmHg         AV peak gradient 89 mmHg         AV LVOT peak gradient 1 mmHg         Area/Dimensions   DVI 0 12          AV valve area 0 57 cm2         AV area by cont VTI 0 6 cm2         AV area peak didi 0 5 cm2         LVOT diameter 2 3 cm         LVOT area 4 15 cm2               Mitral Valve Measurements    Stenosis   MV stenosis pressure 1/2 time 122 ms         MV valve area p 1/2 method 1 8 cm2               Aorta Measurements    Aortic Dimensions   Ao root 3 4 cm         Asc Ao 3 6 cm                  I have personally reviewed pertinent reports  Assessment:  Patient Active Problem List    Diagnosis Date Noted   • Coronary artery disease involving native coronary artery 02/17/2023   • Critical aortic valve stenosis 02/09/2023   • Elevated PSA measurement 07/09/2018   • Dyslipidemia 07/03/2018   • Obesity 09/02/2015   • ED (erectile dysfunction) 07/09/2014   • Impaired fasting glucose 08/17/2012     Severe coronary artery disease; Ongoing CABG workup  Severe aortic stenosis; Ongoing AVR workup    Plan:  Risks and benefits of aortic valve replacement and coronary artery bypass grafting were discussed in detail today with the patient  They understand and wish to proceed with further workup and ultimately surgical intervention  We have ordered routine preoperative laboratory and vascular studies, including carotid artery ultrasound and CT chest without contrast   Pending the results of these tests, they will be scheduled for surgery on Monday March 6, 2023 with Dr Sharlotte Currier, D O Emerson Gowers was comfortable with our recommendations, and their questions were answered to their satisfaction  Thank you for allowing us to participate in the care of this patient  The patient recently had a screening colonoscopy in June 2022  Therefore GI referral is not indicated at this time       SIGNATURE: Rubi Branch PA-C  DATE: February 22, 2023  TIME: 10:36 AM

## 2023-02-25 ENCOUNTER — APPOINTMENT (OUTPATIENT)
Dept: LAB | Facility: CLINIC | Age: 73
End: 2023-02-25

## 2023-02-25 DIAGNOSIS — Z13.1 ENCOUNTER FOR SCREENING FOR DIABETES MELLITUS: ICD-10-CM

## 2023-02-25 DIAGNOSIS — I35.0 CRITICAL AORTIC VALVE STENOSIS: ICD-10-CM

## 2023-02-25 DIAGNOSIS — I25.10 CORONARY ARTERY DISEASE INVOLVING NATIVE CORONARY ARTERY OF NATIVE HEART WITHOUT ANGINA PECTORIS: ICD-10-CM

## 2023-02-25 DIAGNOSIS — Z01.812 ENCOUNTER FOR PRE-OPERATIVE LABORATORY TESTING: ICD-10-CM

## 2023-02-25 DIAGNOSIS — Z01.810 PRE-OPERATIVE CARDIOVASCULAR EXAMINATION: ICD-10-CM

## 2023-02-25 LAB
ABO GROUP BLD: NORMAL
BLD GP AB SCN SERPL QL: NEGATIVE
CHOLEST SERPL-MCNC: 110 MG/DL
EST. AVERAGE GLUCOSE BLD GHB EST-MCNC: 108 MG/DL
HBA1C MFR BLD: 5.4 %
HDLC SERPL-MCNC: 49 MG/DL
INR PPP: 1.03 (ref 0.84–1.19)
LDLC SERPL CALC-MCNC: 52 MG/DL (ref 0–100)
LDLC SERPL DIRECT ASSAY-MCNC: 57 MG/DL (ref 0–100)
NONHDLC SERPL-MCNC: 61 MG/DL
PROTHROMBIN TIME: 13.7 SECONDS (ref 11.6–14.5)
RH BLD: POSITIVE
SPECIMEN EXPIRATION DATE: NORMAL
TRIGL SERPL-MCNC: 45 MG/DL

## 2023-02-26 LAB — MRSA NOSE QL CULT: NORMAL

## 2023-02-27 NOTE — PROGRESS NOTES
2/28/2023    Tao Levi  1950  [id number]      Assessment  -Elevated PSA    Discussion/Plan  Oracio Doyle is a 81 y o  male being managed by our office    1  Elevated PSA- we discussed the results of his recent PSA from 11/29/2022 which is 6 4, previously 5 9  His PSA continues to rise  Would recommend proceeding with multiparametric MRI of prostate for further evaluation  However, he is scheduled for urgent aortic valve replacement on 3/6/2023 and would like to hold off on further testing of elevated PSA at this time  We discussed repeating PSA after cardiac surgery to rule out false elevated result and likely schedule a multiparametric MRI of prostate if PSA remains elevated  Patient has no other urinary complaints  Follow-up in 3 months with repeat PSA  He was advised to call sooner with any questions or issues     -All questions answered, patient agrees with plan      History of Present Illness  81 y o  male with a history of elevated PSA presents today for follow up  Patient last seen in the office in consultation in July 2022  He had presented with PSA of 5 9 from 6/27/2022  Since her last office visit, patient developed symptom of shortness of breath and was evaluated by cardiology  He was found to have severe aortic stenosis and is currently scheduled for aortic valve replacement on 3/6/2023  Patient denies any lower urinary tract symptoms, gross hematuria, or dysuria  He denies any strong family history of prostate malignancy  PSAs    11/29/2022  6 4  6/27/2022  5 9  11/3/2021  4 7  7/6/2018  3 8      Review of Systems  Review of Systems   Constitutional: Negative  HENT: Negative  Respiratory: Negative  Cardiovascular: Negative  Gastrointestinal: Negative  Genitourinary: Negative for decreased urine volume, difficulty urinating, dysuria, flank pain, frequency, hematuria and urgency  Musculoskeletal: Negative  Skin: Negative  Neurological: Negative  Psychiatric/Behavioral: Negative  AUA SYMPTOM SCORE    Flowsheet Row Most Recent Value   AUA SYMPTOM SCORE    How often have you had a sensation of not emptying your bladder completely after you finished urinating? 0 (P)     How often have you had to urinate again less than two hours after you finished urinating? 1 (P)     How often have you found you stopped and started again several times when you urinate? 0 (P)     How often have you found it difficult to postpone urination? 0 (P)     How often have you had a weak urinary stream? 0 (P)     How often have you had to push or strain to begin urination? 0 (P)     How many times did you most typically get up to urinate from the time you went to bed at night until the time you got up in the morning? 0 (P)     Quality of Life: If you were to spend the rest of your life with your urinary condition just the way it is now, how would you feel about that? 0 (P)     AUA SYMPTOM SCORE 1 (P)           Past Medical History  Past Medical History:   Diagnosis Date   • Asthma    • Colon, diverticulosis    • Nonspecific reaction to 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 active tuberculosis     CXR HAS BEEN CLEAR  • Pneumonia        Past Social History  Past Surgical History:   Procedure Laterality Date   • CARDIAC CATHETERIZATION  02/17/2023    Procedure: Cardiac catheterization;  Surgeon: Vicky Tran DO;  Location: [facility]; Service: Cardiology   • CARDIAC CATHETERIZATION N/A 02/17/2023    Procedure: Cardiac Coronary Angiogram;  Surgeon: Vicky Tran DO;  Location: [facility]; Service: Cardiology   • COLONOSCOPY  11/2007    COMPLETE; DONE 11/2007 WITH NO POLYPS, +DIVERTICULA   2/14   • COLONOSCOPY  06/10/2022   • TONSILLECTOMY      LAST ASSESSED: 7/9/14       Past Family History  Family History   Problem Relation Age of Onset   • Diabetes Mother         MELLITUS   • Coronary artery disease Mother    • Coronary artery disease Father    • Stroke Father         SYNDROME       Past Social history  Social History     Socioeconomic History   • Marital status: /Civil Union     Spouse name: Not on file   • Number of children: Not on file   • Years of education: Not on file   • Highest education level: Not on file   Occupational History   • Not on file   Tobacco Use   • Smoking status: Never   • Smokeless tobacco: Never   Substance and Sexual Activity   • Alcohol use: No   • Drug use: No   • Sexual activity: Not on file   Other Topics Concern   • Not on file   Social History Narrative   • Not on file     Social Determinants of Health     Financial Resource Strain: Low Risk    • Difficulty of Paying Living Expenses: Not hard at all   Food Insecurity: Not on file   Transportation Needs: No Transportation Needs   • Lack of Transportation (Medical): No   • Lack of Transportation (Non-Medical): No   Physical Activity: Not on file   Stress: Not on file   Social Connections: Not on file   Intimate Partner Violence: Not on file   Housing Stability: Not on file       Current Medications  Current Outpatient Medications   Medication Sig Dispense Refill   • aspirin (ECOTRIN LOW STRENGTH) 81 mg EC tablet Take 1 tablet (81 mg total) by mouth daily  0   • desoximetasone (TOPICORT) 0 25 % cream Apply topically 2 (two) times a day prn     • metoprolol succinate (TOPROL-XL) 25 mg 24 hr tablet Take 1 tablet (25 mg total) by mouth daily (Patient not taking: Reported on 2/22/2023) 30 tablet 6   • metoprolol tartrate (LOPRESSOR) 25 mg tablet  (Patient not taking: Reported on 2/22/2023)     • mupirocin (BACTROBAN) 2 % nasal ointment into each nostril 2 (two) times a day 10 g 0   • rosuvastatin (CRESTOR) 20 MG tablet Take 1 tablet (20 mg total) by mouth daily 30 tablet 6     No current facility-administered medications for this visit         Allergies  Allergies   Allergen Reactions   • Fish-Derived Products - Food Allergy Hives   • Tuberculin, Ppd Rash       Past Medical History, Social History, Family History, medications and allergies were reviewed  Vitals  There were no vitals filed for this visit  Physical Exam  Physical Exam  Constitutional:       Appearance: Normal appearance  He is well-developed  HENT:      Head: Normocephalic  Eyes:      Pupils: Pupils are equal, round, and reactive to light  Pulmonary:      Effort: Pulmonary effort is normal    Abdominal:      Palpations: Abdomen is soft  Musculoskeletal:         General: Normal range of motion  Cervical back: Normal range of motion  Skin:     General: Skin is warm and dry  Neurological:      General: No focal deficit present  Mental Status: He is alert and oriented to person, place, and time  Psychiatric:         Mood and Affect: Mood normal          Behavior: Behavior normal          Thought Content: Thought content normal          Judgment: Judgment normal          Results    I have personally reviewed all pertinent lab results and reviewed with patient  Lab Results   Component Value Date    PSA 6 4 (H) 11/29/2022    PSA 6 4 (H) 11/29/2022    PSA 5 9 (H) 06/27/2022     Lab Results   Component Value Date    GLUCOSE 110 07/08/2014    CALCIUM 8 5 02/04/2023     07/08/2014    K 3 8 02/04/2023    CO2 27 02/04/2023     02/04/2023    BUN 17 02/04/2023    CREATININE 1 15 02/04/2023     Lab Results   Component Value Date    WBC 6 71 02/04/2023    HGB 14 9 02/04/2023    HCT 44 0 02/04/2023    MCV 95 02/04/2023     02/04/2023     No results found for this or any previous visit (from the past 1 hour(s))

## 2023-02-28 ENCOUNTER — OFFICE VISIT (OUTPATIENT)
Dept: UROLOGY | Facility: AMBULATORY SURGERY CENTER | Age: 73
End: 2023-02-28

## 2023-02-28 VITALS
OXYGEN SATURATION: 97 % | HEART RATE: 57 BPM | DIASTOLIC BLOOD PRESSURE: 70 MMHG | SYSTOLIC BLOOD PRESSURE: 110 MMHG | HEIGHT: 66 IN | WEIGHT: 207 LBS | BODY MASS INDEX: 33.27 KG/M2

## 2023-02-28 DIAGNOSIS — R97.20 ELEVATED PSA: Primary | ICD-10-CM

## 2023-03-01 ENCOUNTER — HOSPITAL ENCOUNTER (OUTPATIENT)
Dept: RADIOLOGY | Facility: HOSPITAL | Age: 73
Discharge: HOME | End: 2023-03-01

## 2023-03-01 DIAGNOSIS — Z01.810 PRE-OPERATIVE CARDIOVASCULAR EXAMINATION: ICD-10-CM

## 2023-03-01 DIAGNOSIS — I25.10 CORONARY ARTERY DISEASE INVOLVING NATIVE CORONARY ARTERY OF NATIVE HEART WITHOUT ANGINA PECTORIS: ICD-10-CM

## 2023-03-01 DIAGNOSIS — I35.0 CRITICAL AORTIC VALVE STENOSIS: ICD-10-CM

## 2023-03-01 DIAGNOSIS — Z01.812 ENCOUNTER FOR PRE-OPERATIVE LABORATORY TESTING: ICD-10-CM

## 2023-03-02 ENCOUNTER — HOSPITAL ENCOUNTER (OUTPATIENT)
Dept: NON INVASIVE DIAGNOSTICS | Facility: CLINIC | Age: 73
Discharge: HOME | End: 2023-03-02

## 2023-03-02 DIAGNOSIS — I25.10 CORONARY ARTERY DISEASE INVOLVING NATIVE CORONARY ARTERY OF NATIVE HEART WITHOUT ANGINA PECTORIS: ICD-10-CM

## 2023-03-02 DIAGNOSIS — Z01.812 ENCOUNTER FOR PRE-OPERATIVE LABORATORY TESTING: ICD-10-CM

## 2023-03-02 DIAGNOSIS — Z01.810 PRE-OPERATIVE CARDIOVASCULAR EXAMINATION: ICD-10-CM

## 2023-03-02 DIAGNOSIS — I35.0 CRITICAL AORTIC VALVE STENOSIS: ICD-10-CM

## 2023-03-02 LAB
ABO GROUP BLD: NORMAL
BLD GP AB SCN SERPL QL: NEGATIVE
RH BLD: POSITIVE
SPECIMEN EXPIRATION DATE: NORMAL

## 2023-03-03 ENCOUNTER — TELEPHONE (OUTPATIENT)
Dept: CARDIAC SURGERY | Facility: CLINIC | Age: 73
End: 2023-03-03

## 2023-03-06 ENCOUNTER — ANESTHESIA (OUTPATIENT)
Dept: PERIOP | Facility: HOSPITAL | Age: 73
End: 2023-03-06

## 2023-03-06 ENCOUNTER — ANESTHESIA EVENT (OUTPATIENT)
Dept: PERIOP | Facility: HOSPITAL | Age: 73
End: 2023-03-06

## 2023-03-06 ENCOUNTER — APPOINTMENT (OUTPATIENT)
Dept: RADIOLOGY | Facility: HOSPITAL | Age: 73
End: 2023-03-06

## 2023-03-06 ENCOUNTER — HOSPITAL ENCOUNTER (INPATIENT)
Facility: HOSPITAL | Age: 73
LOS: 4 days | Discharge: HOME HEALTH | End: 2023-03-10
Attending: THORACIC SURGERY (CARDIOTHORACIC VASCULAR SURGERY) | Admitting: THORACIC SURGERY (CARDIOTHORACIC VASCULAR SURGERY)

## 2023-03-06 ENCOUNTER — APPOINTMENT (OUTPATIENT)
Dept: NON INVASIVE DIAGNOSTICS | Facility: HOSPITAL | Age: 73
End: 2023-03-06
Attending: THORACIC SURGERY (CARDIOTHORACIC VASCULAR SURGERY)

## 2023-03-06 DIAGNOSIS — I25.10 CAD IN NATIVE ARTERY: ICD-10-CM

## 2023-03-06 DIAGNOSIS — Z95.2 S/P AVR (AORTIC VALVE REPLACEMENT): ICD-10-CM

## 2023-03-06 DIAGNOSIS — Z95.1 S/P CABG X 1: ICD-10-CM

## 2023-03-06 DIAGNOSIS — I35.9 NONRHEUMATIC AORTIC VALVE DISORDER: ICD-10-CM

## 2023-03-06 DIAGNOSIS — I35.0 CRITICAL AORTIC VALVE STENOSIS: ICD-10-CM

## 2023-03-06 DIAGNOSIS — N17.9 AKI (ACUTE KIDNEY INJURY) (HCC): ICD-10-CM

## 2023-03-06 DIAGNOSIS — R73.01 IMPAIRED FASTING GLUCOSE: ICD-10-CM

## 2023-03-06 DIAGNOSIS — I25.10 CORONARY ARTERY DISEASE INVOLVING NATIVE CORONARY ARTERY OF NATIVE HEART WITHOUT ANGINA PECTORIS: Primary | ICD-10-CM

## 2023-03-06 DIAGNOSIS — D62 ACUTE BLOOD LOSS AS CAUSE OF POSTOPERATIVE ANEMIA: ICD-10-CM

## 2023-03-06 LAB
ABO GROUP BLD BPU: NORMAL
ABO GROUP BLD: NORMAL
ANION GAP SERPL CALCULATED.3IONS-SCNC: 3 MMOL/L (ref 4–13)
APTT PPP: 29 SECONDS (ref 23–37)
APTT PPP: 32 SECONDS (ref 23–37)
ATRIAL RATE: 52 BPM
BACTERIA UR QL AUTO: NORMAL /HPF
BASE EXCESS BLDA CALC-SCNC: -1 MMOL/L (ref -2–3)
BASE EXCESS BLDA CALC-SCNC: -2 MMOL/L (ref -2–3)
BASE EXCESS BLDA CALC-SCNC: -2 MMOL/L (ref -2–3)
BASE EXCESS BLDA CALC-SCNC: -3 MMOL/L (ref -2–3)
BASE EXCESS BLDA CALC-SCNC: -5.5 MMOL/L
BASE EXCESS BLDA CALC-SCNC: 0 MMOL/L (ref -2–3)
BASE EXCESS BLDA CALC-SCNC: 0 MMOL/L (ref -2–3)
BILIRUB UR QL STRIP: NEGATIVE
BLD GP AB SCN SERPL QL: NEGATIVE
BPU ID: NORMAL
BUN SERPL-MCNC: 14 MG/DL (ref 5–25)
CA-I BLD-SCNC: 1.09 MMOL/L (ref 1.12–1.32)
CA-I BLD-SCNC: 1.1 MMOL/L (ref 1.12–1.32)
CA-I BLD-SCNC: 1.1 MMOL/L (ref 1.12–1.32)
CA-I BLD-SCNC: 1.11 MMOL/L (ref 1.12–1.32)
CA-I BLD-SCNC: 1.24 MMOL/L (ref 1.12–1.32)
CA-I BLD-SCNC: 1.3 MMOL/L (ref 1.12–1.32)
CALCIUM SERPL-MCNC: 8.7 MG/DL (ref 8.3–10.1)
CFFFLEV: 372.3 MG/DL (ref 278–581)
CFFMA (FUNCTIONAL FIBRINOGEN MAX AMPLITUDE): 20.4 MM (ref 15–32)
CHLORIDE SERPL-SCNC: 113 MMOL/L (ref 96–108)
CKA(ANGLE): 71.4 DEG (ref 63–78)
CKHR(HEPARINASE REACTION TIME): 7 MIN (ref 4.3–8.3)
CKK(CLOT KINETICS): 1.5 MIN (ref 0.8–2.1)
CKMA(MAX AMPLITUDE): 59.2 MM (ref 52–69)
CKR(REACTION TIME): 7.1 MIN (ref 4.6–9.1)
CLARITY UR: CLEAR
CO2 SERPL-SCNC: 24 MMOL/L (ref 21–32)
COLOR UR: COLORLESS
CREAT SERPL-MCNC: 1.01 MG/DL (ref 0.6–1.3)
CROSSMATCH: NORMAL
CRTMA(RAPIDTEG MAX AMPLITUDE): 59.6 MM (ref 52–70)
ERYTHROCYTE [DISTWIDTH] IN BLOOD BY AUTOMATED COUNT: 12.6 % (ref 11.6–15.1)
FIBRINOGEN PPP-MCNC: 292 MG/DL (ref 227–495)
FIBRINOGEN PPP-MCNC: 295 MG/DL (ref 227–495)
FIO2 GAS DIL.REBREATH: 65 L
GFR SERPL CREATININE-BSD FRML MDRD: 75 ML/MIN/1.73SQ M
GLUCOSE SERPL-MCNC: 100 MG/DL (ref 65–140)
GLUCOSE SERPL-MCNC: 126 MG/DL (ref 65–140)
GLUCOSE SERPL-MCNC: 128 MG/DL (ref 65–140)
GLUCOSE SERPL-MCNC: 138 MG/DL (ref 65–140)
GLUCOSE SERPL-MCNC: 138 MG/DL (ref 65–140)
GLUCOSE SERPL-MCNC: 139 MG/DL (ref 65–140)
GLUCOSE SERPL-MCNC: 142 MG/DL (ref 65–140)
GLUCOSE SERPL-MCNC: 143 MG/DL (ref 65–140)
GLUCOSE SERPL-MCNC: 149 MG/DL (ref 65–140)
GLUCOSE SERPL-MCNC: 151 MG/DL (ref 65–140)
GLUCOSE SERPL-MCNC: 159 MG/DL (ref 65–140)
GLUCOSE SERPL-MCNC: 159 MG/DL (ref 65–140)
GLUCOSE SERPL-MCNC: 169 MG/DL (ref 65–140)
GLUCOSE SERPL-MCNC: 176 MG/DL (ref 65–140)
GLUCOSE SERPL-MCNC: 197 MG/DL (ref 65–140)
GLUCOSE UR STRIP-MCNC: NEGATIVE MG/DL
HCO3 BLDA-SCNC: 18.4 MMOL/L (ref 22–28)
HCO3 BLDA-SCNC: 23.2 MMOL/L (ref 22–28)
HCO3 BLDA-SCNC: 23.4 MMOL/L (ref 22–28)
HCO3 BLDA-SCNC: 23.6 MMOL/L (ref 22–28)
HCO3 BLDA-SCNC: 24 MMOL/L (ref 24–30)
HCO3 BLDA-SCNC: 24.3 MMOL/L (ref 22–28)
HCO3 BLDA-SCNC: 25.2 MMOL/L (ref 22–28)
HCO3 BLDA-SCNC: 25.3 MMOL/L (ref 24–30)
HCO3 BLDA-SCNC: 25.4 MMOL/L (ref 22–28)
HCT VFR BLD AUTO: 33.4 % (ref 36.5–49.3)
HCT VFR BLD AUTO: 33.7 % (ref 36.5–49.3)
HCT VFR BLD CALC: 27 % (ref 36.5–49.3)
HCT VFR BLD CALC: 28 % (ref 36.5–49.3)
HCT VFR BLD CALC: 28 % (ref 36.5–49.3)
HCT VFR BLD CALC: 30 % (ref 36.5–49.3)
HCT VFR BLD CALC: 35 % (ref 36.5–49.3)
HCT VFR BLD CALC: 36 % (ref 36.5–49.3)
HCT VFR BLD CALC: 38 % (ref 36.5–49.3)
HCT VFR BLD CALC: 39 % (ref 36.5–49.3)
HGB BLD-MCNC: 11.2 G/DL (ref 12–17)
HGB BLD-MCNC: 11.4 G/DL (ref 12–17)
HGB BLDA-MCNC: 10.2 G/DL (ref 12–17)
HGB BLDA-MCNC: 11.9 G/DL (ref 12–17)
HGB BLDA-MCNC: 12.2 G/DL (ref 12–17)
HGB BLDA-MCNC: 12.9 G/DL (ref 12–17)
HGB BLDA-MCNC: 13.3 G/DL (ref 12–17)
HGB BLDA-MCNC: 9.2 G/DL (ref 12–17)
HGB BLDA-MCNC: 9.5 G/DL (ref 12–17)
HGB BLDA-MCNC: 9.5 G/DL (ref 12–17)
HGB UR QL STRIP.AUTO: ABNORMAL
INR PPP: 1.36 (ref 0.84–1.19)
INR PPP: 1.38 (ref 0.84–1.19)
KCT BLD-ACNC: 144 SEC (ref 89–137)
KCT BLD-ACNC: 151 SEC (ref 89–137)
KCT BLD-ACNC: 455 SEC (ref 89–137)
KCT BLD-ACNC: 581 SEC (ref 89–137)
KCT BLD-ACNC: 619 SEC (ref 89–137)
KCT BLD-ACNC: 825 SEC (ref 89–137)
KCT BLD-ACNC: >1000 SEC (ref 89–137)
KETONES UR STRIP-MCNC: NEGATIVE MG/DL
LEUKOCYTE ESTERASE UR QL STRIP: NEGATIVE
MCH RBC QN AUTO: 32.5 PG (ref 26.8–34.3)
MCHC RBC AUTO-ENTMCNC: 34.1 G/DL (ref 31.4–37.4)
MCV RBC AUTO: 95 FL (ref 82–98)
NITRITE UR QL STRIP: NEGATIVE
NON-SQ EPI CELLS URNS QL MICRO: NORMAL /HPF
O2 CT BLDA-SCNC: 16.4 ML/DL (ref 16–23)
OXYHGB MFR BLDA: 95.7 % (ref 94–97)
P AXIS: 23 DEGREES
PCO2 BLD: 25 MMOL/L (ref 21–32)
PCO2 BLD: 26 MMOL/L (ref 21–32)
PCO2 BLD: 26 MMOL/L (ref 21–32)
PCO2 BLD: 27 MMOL/L (ref 21–32)
PCO2 BLD: 27 MMOL/L (ref 21–32)
PCO2 BLD: 40 MM HG (ref 36–44)
PCO2 BLD: 42.3 MM HG (ref 36–44)
PCO2 BLD: 45.7 MM HG (ref 36–44)
PCO2 BLD: 45.7 MM HG (ref 42–50)
PCO2 BLD: 48.4 MM HG (ref 36–44)
PCO2 BLD: 48.5 MM HG (ref 42–50)
PCO2 BLD: 49 MM HG (ref 36–44)
PCO2 BLD: 49.3 MM HG (ref 36–44)
PCO2 BLDA: 31.1 MM HG (ref 36–44)
PH BLD: 7.29 [PH] (ref 7.35–7.45)
PH BLD: 7.3 [PH] (ref 7.35–7.45)
PH BLD: 7.3 [PH] (ref 7.35–7.45)
PH BLD: 7.31 [PH] (ref 7.35–7.45)
PH BLD: 7.33 [PH] (ref 7.3–7.4)
PH BLD: 7.33 [PH] (ref 7.3–7.4)
PH BLD: 7.39 [PH] (ref 7.35–7.45)
PH BLD: 7.41 [PH] (ref 7.35–7.45)
PH BLDA: 7.39 [PH] (ref 7.35–7.45)
PH UR STRIP.AUTO: 6 [PH]
PLATELET # BLD AUTO: 125 THOUSANDS/UL (ref 149–390)
PLATELET # BLD AUTO: 126 THOUSANDS/UL (ref 149–390)
PLATELET # BLD AUTO: 160 THOUSANDS/UL (ref 149–390)
PMV BLD AUTO: 10.5 FL (ref 8.9–12.7)
PMV BLD AUTO: 10.6 FL (ref 8.9–12.7)
PMV BLD AUTO: 10.9 FL (ref 8.9–12.7)
PO2 BLD: 121 MM HG (ref 75–129)
PO2 BLD: 243 MM HG (ref 75–129)
PO2 BLD: 43 MM HG (ref 35–45)
PO2 BLD: 62 MM HG (ref 35–45)
PO2 BLD: 66 MM HG (ref 75–129)
PO2 BLD: 70 MM HG (ref 75–129)
PO2 BLD: >400 MM HG (ref 75–129)
PO2 BLD: >400 MM HG (ref 75–129)
PO2 BLDA: 95.9 MM HG (ref 75–129)
POTASSIUM BLD-SCNC: 3.8 MMOL/L (ref 3.5–5.3)
POTASSIUM BLD-SCNC: 3.9 MMOL/L (ref 3.5–5.3)
POTASSIUM BLD-SCNC: 4.1 MMOL/L (ref 3.5–5.3)
POTASSIUM BLD-SCNC: 4.2 MMOL/L (ref 3.5–5.3)
POTASSIUM BLD-SCNC: 4.4 MMOL/L (ref 3.5–5.3)
POTASSIUM BLD-SCNC: 4.7 MMOL/L (ref 3.5–5.3)
POTASSIUM SERPL-SCNC: 4 MMOL/L (ref 3.5–5.3)
POTASSIUM SERPL-SCNC: 4.2 MMOL/L (ref 3.5–5.3)
POTASSIUM SERPL-SCNC: 4.4 MMOL/L (ref 3.5–5.3)
PR INTERVAL: 144 MS
PROT UR STRIP-MCNC: NEGATIVE MG/DL
PROTHROMBIN TIME: 17 SECONDS (ref 11.6–14.5)
PROTHROMBIN TIME: 17.2 SECONDS (ref 11.6–14.5)
QRS AXIS: 30 DEGREES
QRSD INTERVAL: 88 MS
QT INTERVAL: 438 MS
QTC INTERVAL: 407 MS
RBC # BLD AUTO: 3.51 MILLION/UL (ref 3.88–5.62)
RBC #/AREA URNS AUTO: NORMAL /HPF
RH BLD: POSITIVE
SAO2 % BLD FROM PO2: 100 % (ref 60–85)
SAO2 % BLD FROM PO2: 75 % (ref 60–85)
SAO2 % BLD FROM PO2: 89 % (ref 60–85)
SAO2 % BLD FROM PO2: 91 % (ref 60–85)
SAO2 % BLD FROM PO2: 91 % (ref 60–85)
SAO2 % BLD FROM PO2: 98 % (ref 60–85)
SODIUM BLD-SCNC: 135 MMOL/L (ref 136–145)
SODIUM BLD-SCNC: 135 MMOL/L (ref 136–145)
SODIUM BLD-SCNC: 138 MMOL/L (ref 136–145)
SODIUM BLD-SCNC: 138 MMOL/L (ref 136–145)
SODIUM BLD-SCNC: 139 MMOL/L (ref 136–145)
SODIUM BLD-SCNC: 141 MMOL/L (ref 136–145)
SODIUM SERPL-SCNC: 140 MMOL/L (ref 135–147)
SP GR UR STRIP.AUTO: 1.01 (ref 1–1.03)
SPECIMEN EXPIRATION DATE: NORMAL
SPECIMEN SOURCE: ABNORMAL
T WAVE AXIS: -66 DEGREES
UNIT DISPENSE STATUS: NORMAL
UNIT PRODUCT CODE: NORMAL
UNIT PRODUCT VOLUME: 350 ML
UNIT RH: NORMAL
UROBILINOGEN UR STRIP-ACNC: <2 MG/DL
VENTRICULAR RATE: 52 BPM
WBC # BLD AUTO: 12.06 THOUSAND/UL (ref 4.31–10.16)
WBC #/AREA URNS AUTO: NORMAL /HPF

## 2023-03-06 PROCEDURE — 02RF08Z REPLACEMENT OF AORTIC VALVE WITH ZOOPLASTIC TISSUE, OPEN APPROACH: ICD-10-PCS | Performed by: THORACIC SURGERY (CARDIOTHORACIC VASCULAR SURGERY)

## 2023-03-06 PROCEDURE — 5A1221Z PERFORMANCE OF CARDIAC OUTPUT, CONTINUOUS: ICD-10-PCS | Performed by: THORACIC SURGERY (CARDIOTHORACIC VASCULAR SURGERY)

## 2023-03-06 PROCEDURE — 30233N0 TRANSFUSION OF AUTOLOGOUS RED BLOOD CELLS INTO PERIPHERAL VEIN, PERCUTANEOUS APPROACH: ICD-10-PCS | Performed by: THORACIC SURGERY (CARDIOTHORACIC VASCULAR SURGERY)

## 2023-03-06 PROCEDURE — 02HV33Z INSERTION OF INFUSION DEVICE INTO SUPERIOR VENA CAVA, PERCUTANEOUS APPROACH: ICD-10-PCS | Performed by: ANESTHESIOLOGY

## 2023-03-06 PROCEDURE — 30233R1 TRANSFUSION OF NONAUTOLOGOUS PLATELETS INTO PERIPHERAL VEIN, PERCUTANEOUS APPROACH: ICD-10-PCS | Performed by: THORACIC SURGERY (CARDIOTHORACIC VASCULAR SURGERY)

## 2023-03-06 PROCEDURE — 02100Z9 BYPASS CORONARY ARTERY, ONE ARTERY FROM LEFT INTERNAL MAMMARY, OPEN APPROACH: ICD-10-PCS | Performed by: THORACIC SURGERY (CARDIOTHORACIC VASCULAR SURGERY)

## 2023-03-06 PROCEDURE — 5A1223Z PERFORMANCE OF CARDIAC PACING, CONTINUOUS: ICD-10-PCS | Performed by: THORACIC SURGERY (CARDIOTHORACIC VASCULAR SURGERY)

## 2023-03-06 DEVICE — VALVE AORTIC INSPIRIS RESILIA 25MM: Type: IMPLANTABLE DEVICE | Site: HEART | Status: FUNCTIONAL

## 2023-03-06 RX ORDER — ROCURONIUM BROMIDE 10 MG/ML
INJECTION, SOLUTION INTRAVENOUS AS NEEDED
Status: DISCONTINUED | OUTPATIENT
Start: 2023-03-06 | End: 2023-03-06

## 2023-03-06 RX ORDER — HEPARIN SODIUM 1000 [USP'U]/ML
10000 INJECTION, SOLUTION INTRAVENOUS; SUBCUTANEOUS ONCE
Status: COMPLETED | OUTPATIENT
Start: 2023-03-06 | End: 2023-03-06

## 2023-03-06 RX ORDER — ESMOLOL HYDROCHLORIDE 10 MG/ML
INJECTION INTRAVENOUS AS NEEDED
Status: DISCONTINUED | OUTPATIENT
Start: 2023-03-06 | End: 2023-03-06

## 2023-03-06 RX ORDER — CEFAZOLIN SODIUM 2 G/50ML
2000 SOLUTION INTRAVENOUS EVERY 8 HOURS
Status: COMPLETED | OUTPATIENT
Start: 2023-03-06 | End: 2023-03-07

## 2023-03-06 RX ORDER — PROTAMINE SULFATE 10 MG/ML
INJECTION, SOLUTION INTRAVENOUS AS NEEDED
Status: DISCONTINUED | OUTPATIENT
Start: 2023-03-06 | End: 2023-03-06

## 2023-03-06 RX ORDER — HYDROMORPHONE HCL/PF 1 MG/ML
0.5 SYRINGE (ML) INJECTION EVERY 2 HOUR PRN
Status: DISCONTINUED | OUTPATIENT
Start: 2023-03-06 | End: 2023-03-07

## 2023-03-06 RX ORDER — CEFAZOLIN SODIUM 2 G/50ML
2000 SOLUTION INTRAVENOUS ONCE
Status: COMPLETED | OUTPATIENT
Start: 2023-03-06 | End: 2023-03-06

## 2023-03-06 RX ORDER — SODIUM CHLORIDE 9 MG/ML
INJECTION, SOLUTION INTRAVENOUS CONTINUOUS PRN
Status: DISCONTINUED | OUTPATIENT
Start: 2023-03-06 | End: 2023-03-06

## 2023-03-06 RX ORDER — CARDIOPLEGIC SOLUTION NO.16 26/1052.8
PLASTIC BAG, PERFUSION (ML) PERFUSION AS NEEDED
Status: DISCONTINUED | OUTPATIENT
Start: 2023-03-06 | End: 2023-03-06

## 2023-03-06 RX ORDER — HEPARIN SODIUM 1000 [USP'U]/ML
INJECTION, SOLUTION INTRAVENOUS; SUBCUTANEOUS AS NEEDED
Status: DISCONTINUED | OUTPATIENT
Start: 2023-03-06 | End: 2023-03-06

## 2023-03-06 RX ORDER — AMIODARONE HYDROCHLORIDE 200 MG/1
200 TABLET ORAL EVERY 8 HOURS SCHEDULED
Status: DISCONTINUED | OUTPATIENT
Start: 2023-03-06 | End: 2023-03-10 | Stop reason: HOSPADM

## 2023-03-06 RX ORDER — PANTOPRAZOLE SODIUM 40 MG/1
40 TABLET, DELAYED RELEASE ORAL
Status: DISCONTINUED | OUTPATIENT
Start: 2023-03-07 | End: 2023-03-10 | Stop reason: HOSPADM

## 2023-03-06 RX ORDER — POTASSIUM CHLORIDE 14.9 MG/ML
20 INJECTION INTRAVENOUS
Status: DISCONTINUED | OUTPATIENT
Start: 2023-03-06 | End: 2023-03-07

## 2023-03-06 RX ORDER — HEPARIN SODIUM 1000 [USP'U]/ML
400 INJECTION, SOLUTION INTRAVENOUS; SUBCUTANEOUS ONCE
Status: DISCONTINUED | OUTPATIENT
Start: 2023-03-06 | End: 2023-03-06 | Stop reason: HOSPADM

## 2023-03-06 RX ORDER — DEXMEDETOMIDINE HYDROCHLORIDE 4 UG/ML
.1-.7 INJECTION, SOLUTION INTRAVENOUS
Status: DISCONTINUED | OUTPATIENT
Start: 2023-03-06 | End: 2023-03-06

## 2023-03-06 RX ORDER — PROPOFOL 10 MG/ML
INJECTION, EMULSION INTRAVENOUS AS NEEDED
Status: DISCONTINUED | OUTPATIENT
Start: 2023-03-06 | End: 2023-03-06

## 2023-03-06 RX ORDER — ALBUMIN, HUMAN INJ 5% 5 %
12.5 SOLUTION INTRAVENOUS ONCE
Status: COMPLETED | OUTPATIENT
Start: 2023-03-06 | End: 2023-03-06

## 2023-03-06 RX ORDER — ALBUMIN, HUMAN INJ 5% 5 %
25 SOLUTION INTRAVENOUS ONCE
Status: COMPLETED | OUTPATIENT
Start: 2023-03-06 | End: 2023-03-06

## 2023-03-06 RX ORDER — ACETAMINOPHEN 650 MG/1
650 SUPPOSITORY RECTAL EVERY 4 HOURS PRN
Status: DISCONTINUED | OUTPATIENT
Start: 2023-03-06 | End: 2023-03-07

## 2023-03-06 RX ORDER — POTASSIUM CHLORIDE 14.9 MG/ML
20 INJECTION INTRAVENOUS ONCE AS NEEDED
Status: DISCONTINUED | OUTPATIENT
Start: 2023-03-06 | End: 2023-03-07

## 2023-03-06 RX ORDER — AMINOCAPROIC ACID 250 MG/ML
INJECTION, SOLUTION INTRAVENOUS AS NEEDED
Status: DISCONTINUED | OUTPATIENT
Start: 2023-03-06 | End: 2023-03-06

## 2023-03-06 RX ORDER — FONDAPARINUX SODIUM 2.5 MG/.5ML
2.5 INJECTION SUBCUTANEOUS DAILY
Status: DISCONTINUED | OUTPATIENT
Start: 2023-03-07 | End: 2023-03-07

## 2023-03-06 RX ORDER — CHLORHEXIDINE GLUCONATE 0.12 MG/ML
15 RINSE ORAL 2 TIMES DAILY
Status: DISCONTINUED | OUTPATIENT
Start: 2023-03-06 | End: 2023-03-07

## 2023-03-06 RX ORDER — ATORVASTATIN CALCIUM 80 MG/1
80 TABLET, FILM COATED ORAL
Status: DISCONTINUED | OUTPATIENT
Start: 2023-03-06 | End: 2023-03-10 | Stop reason: HOSPADM

## 2023-03-06 RX ORDER — ASPIRIN 325 MG
325 TABLET ORAL DAILY
Status: DISCONTINUED | OUTPATIENT
Start: 2023-03-06 | End: 2023-03-10 | Stop reason: HOSPADM

## 2023-03-06 RX ORDER — CHLORHEXIDINE GLUCONATE 0.12 MG/ML
15 RINSE ORAL ONCE
Status: COMPLETED | OUTPATIENT
Start: 2023-03-06 | End: 2023-03-06

## 2023-03-06 RX ORDER — FENTANYL CITRATE 50 UG/ML
50 INJECTION, SOLUTION INTRAMUSCULAR; INTRAVENOUS ONCE
Status: COMPLETED | OUTPATIENT
Start: 2023-03-06 | End: 2023-03-06

## 2023-03-06 RX ORDER — SODIUM CHLORIDE 450 MG/100ML
20 INJECTION, SOLUTION INTRAVENOUS CONTINUOUS
Status: DISCONTINUED | OUTPATIENT
Start: 2023-03-06 | End: 2023-03-09

## 2023-03-06 RX ORDER — NEOSTIGMINE METHYLSULFATE 1 MG/ML
INJECTION INTRAVENOUS AS NEEDED
Status: DISCONTINUED | OUTPATIENT
Start: 2023-03-06 | End: 2023-03-06

## 2023-03-06 RX ORDER — SODIUM CHLORIDE, SODIUM GLUCONATE, SODIUM ACETATE, POTASSIUM CHLORIDE, MAGNESIUM CHLORIDE, SODIUM PHOSPHATE, DIBASIC, AND POTASSIUM PHOSPHATE .53; .5; .37; .037; .03; .012; .00082 G/100ML; G/100ML; G/100ML; G/100ML; G/100ML; G/100ML; G/100ML
INJECTION, SOLUTION INTRAVENOUS AS NEEDED
Status: DISCONTINUED | OUTPATIENT
Start: 2023-03-06 | End: 2023-03-06

## 2023-03-06 RX ORDER — OXYCODONE HYDROCHLORIDE 5 MG/1
5 TABLET ORAL EVERY 4 HOURS PRN
Status: DISCONTINUED | OUTPATIENT
Start: 2023-03-06 | End: 2023-03-10 | Stop reason: HOSPADM

## 2023-03-06 RX ORDER — FENTANYL CITRATE 50 UG/ML
50 INJECTION, SOLUTION INTRAMUSCULAR; INTRAVENOUS
Status: DISCONTINUED | OUTPATIENT
Start: 2023-03-06 | End: 2023-03-07

## 2023-03-06 RX ORDER — SODIUM CHLORIDE, SODIUM LACTATE, POTASSIUM CHLORIDE, CALCIUM CHLORIDE 600; 310; 30; 20 MG/100ML; MG/100ML; MG/100ML; MG/100ML
125 INJECTION, SOLUTION INTRAVENOUS CONTINUOUS
Status: DISCONTINUED | OUTPATIENT
Start: 2023-03-06 | End: 2023-03-06

## 2023-03-06 RX ORDER — OXYCODONE HYDROCHLORIDE 5 MG/1
2.5 TABLET ORAL EVERY 4 HOURS PRN
Status: DISCONTINUED | OUTPATIENT
Start: 2023-03-06 | End: 2023-03-10 | Stop reason: HOSPADM

## 2023-03-06 RX ORDER — BISACODYL 10 MG
10 SUPPOSITORY, RECTAL RECTAL DAILY PRN
Status: DISCONTINUED | OUTPATIENT
Start: 2023-03-06 | End: 2023-03-10 | Stop reason: HOSPADM

## 2023-03-06 RX ORDER — MAGNESIUM SULFATE HEPTAHYDRATE 40 MG/ML
2 INJECTION, SOLUTION INTRAVENOUS ONCE
Status: COMPLETED | OUTPATIENT
Start: 2023-03-06 | End: 2023-03-06

## 2023-03-06 RX ORDER — MAGNESIUM HYDROXIDE 1200 MG/15ML
LIQUID ORAL AS NEEDED
Status: DISCONTINUED | OUTPATIENT
Start: 2023-03-06 | End: 2023-03-06 | Stop reason: HOSPADM

## 2023-03-06 RX ORDER — SODIUM CHLORIDE, SODIUM LACTATE, POTASSIUM CHLORIDE, CALCIUM CHLORIDE 600; 310; 30; 20 MG/100ML; MG/100ML; MG/100ML; MG/100ML
INJECTION, SOLUTION INTRAVENOUS CONTINUOUS PRN
Status: DISCONTINUED | OUTPATIENT
Start: 2023-03-06 | End: 2023-03-06

## 2023-03-06 RX ORDER — ONDANSETRON 2 MG/ML
4 INJECTION INTRAMUSCULAR; INTRAVENOUS EVERY 6 HOURS PRN
Status: DISCONTINUED | OUTPATIENT
Start: 2023-03-06 | End: 2023-03-10 | Stop reason: HOSPADM

## 2023-03-06 RX ORDER — CALCIUM CHLORIDE 100 MG/ML
1 INJECTION INTRAVENOUS; INTRAVENTRICULAR ONCE
Status: DISCONTINUED | OUTPATIENT
Start: 2023-03-06 | End: 2023-03-07

## 2023-03-06 RX ORDER — GLYCOPYRROLATE 0.2 MG/ML
INJECTION INTRAMUSCULAR; INTRAVENOUS AS NEEDED
Status: DISCONTINUED | OUTPATIENT
Start: 2023-03-06 | End: 2023-03-06

## 2023-03-06 RX ORDER — FENTANYL CITRATE 50 UG/ML
INJECTION, SOLUTION INTRAMUSCULAR; INTRAVENOUS AS NEEDED
Status: DISCONTINUED | OUTPATIENT
Start: 2023-03-06 | End: 2023-03-06

## 2023-03-06 RX ORDER — VANCOMYCIN HYDROCHLORIDE 1 G/20ML
INJECTION, POWDER, LYOPHILIZED, FOR SOLUTION INTRAVENOUS AS NEEDED
Status: DISCONTINUED | OUTPATIENT
Start: 2023-03-06 | End: 2023-03-06 | Stop reason: HOSPADM

## 2023-03-06 RX ORDER — SUCCINYLCHOLINE/SOD CL,ISO/PF 100 MG/5ML
SYRINGE (ML) INTRAVENOUS AS NEEDED
Status: DISCONTINUED | OUTPATIENT
Start: 2023-03-06 | End: 2023-03-06

## 2023-03-06 RX ORDER — FUROSEMIDE 10 MG/ML
40 INJECTION INTRAMUSCULAR; INTRAVENOUS EVERY 6 HOURS PRN
Status: DISCONTINUED | OUTPATIENT
Start: 2023-03-06 | End: 2023-03-07

## 2023-03-06 RX ORDER — MANNITOL 250 MG/ML
INJECTION, SOLUTION INTRAVENOUS AS NEEDED
Status: DISCONTINUED | OUTPATIENT
Start: 2023-03-06 | End: 2023-03-06

## 2023-03-06 RX ORDER — POLYETHYLENE GLYCOL 3350 17 G/17G
17 POWDER, FOR SOLUTION ORAL DAILY
Status: DISCONTINUED | OUTPATIENT
Start: 2023-03-07 | End: 2023-03-10 | Stop reason: HOSPADM

## 2023-03-06 RX ORDER — ATROPINE SULFATE 1 MG/ML
INJECTION, SOLUTION INTRAVENOUS AS NEEDED
Status: DISCONTINUED | OUTPATIENT
Start: 2023-03-06 | End: 2023-03-06

## 2023-03-06 RX ORDER — ACETAMINOPHEN 325 MG/1
975 TABLET ORAL EVERY 8 HOURS
Status: DISCONTINUED | OUTPATIENT
Start: 2023-03-06 | End: 2023-03-10 | Stop reason: HOSPADM

## 2023-03-06 RX ADMIN — FENTANYL CITRATE 1000 MCG: 0.05 INJECTION, SOLUTION INTRAMUSCULAR; INTRAVENOUS at 08:40

## 2023-03-06 RX ADMIN — PHENYLEPHRINE HYDROCHLORIDE 30 MCG/MIN: 50 INJECTION INTRAVENOUS at 14:00

## 2023-03-06 RX ADMIN — PHENYLEPHRINE HYDROCHLORIDE 500 MCG: 50 INJECTION INTRAVENOUS at 10:29

## 2023-03-06 RX ADMIN — CEFAZOLIN SODIUM 2000 MG: 2 SOLUTION INTRAVENOUS at 18:19

## 2023-03-06 RX ADMIN — Medication 750 ML: at 09:18

## 2023-03-06 RX ADMIN — MAGNESIUM SULFATE HEPTAHYDRATE 2 G: 40 INJECTION, SOLUTION INTRAVENOUS at 12:06

## 2023-03-06 RX ADMIN — AMINOCAPROIC ACID 1 G/HR: 250 INJECTION, SOLUTION INTRAVENOUS at 12:31

## 2023-03-06 RX ADMIN — HEPARIN SODIUM 5000 UNITS: 1000 INJECTION INTRAVENOUS; SUBCUTANEOUS at 08:17

## 2023-03-06 RX ADMIN — MUPIROCIN 1 APPLICATION.: 20 OINTMENT TOPICAL at 05:46

## 2023-03-06 RX ADMIN — SODIUM CHLORIDE: 0.9 INJECTION, SOLUTION INTRAVENOUS at 07:47

## 2023-03-06 RX ADMIN — INSULIN HUMAN 5 UNITS: 100 INJECTION, SOLUTION PARENTERAL at 10:15

## 2023-03-06 RX ADMIN — SODIUM CHLORIDE 20 ML/HR: 0.45 INJECTION, SOLUTION INTRAVENOUS at 12:00

## 2023-03-06 RX ADMIN — ESMOLOL HYDROCHLORIDE 100 MG: 10 INJECTION, SOLUTION INTRAVENOUS at 07:31

## 2023-03-06 RX ADMIN — LIDOCAINE HYDROCHLORIDE 100 MG: 20 INJECTION INTRAVENOUS at 10:30

## 2023-03-06 RX ADMIN — AMIODARONE HYDROCHLORIDE 200 MG: 200 TABLET ORAL at 21:28

## 2023-03-06 RX ADMIN — ROCURONIUM 50 MG: 50 INJECTION, SOLUTION INTRAVENOUS at 07:33

## 2023-03-06 RX ADMIN — OXYCODONE HYDROCHLORIDE 5 MG: 5 TABLET ORAL at 18:25

## 2023-03-06 RX ADMIN — Medication 12.5 MG: at 05:46

## 2023-03-06 RX ADMIN — PROPOFOL 170 MG: 10 INJECTION, EMULSION INTRAVENOUS at 07:30

## 2023-03-06 RX ADMIN — HEPARIN SODIUM 1 EACH: 5000 INJECTION INTRAVENOUS; SUBCUTANEOUS at 08:19

## 2023-03-06 RX ADMIN — NEOSTIGMINE METHYLSULFATE 4 MG: 1 INJECTION INTRAVENOUS at 11:16

## 2023-03-06 RX ADMIN — HYDROMORPHONE HYDROCHLORIDE 0.5 MG: 1 INJECTION, SOLUTION INTRAMUSCULAR; INTRAVENOUS; SUBCUTANEOUS at 16:04

## 2023-03-06 RX ADMIN — AMIODARONE HYDROCHLORIDE 150 MG: 50 INJECTION, SOLUTION INTRAVENOUS at 09:44

## 2023-03-06 RX ADMIN — HEPARIN SODIUM 5000 UNITS: 1000 INJECTION, SOLUTION INTRAVENOUS; SUBCUTANEOUS at 10:07

## 2023-03-06 RX ADMIN — HEPARIN SODIUM 5000 UNITS: 1000 INJECTION INTRAVENOUS; SUBCUTANEOUS at 09:51

## 2023-03-06 RX ADMIN — MANNITOL 25 G: 12.5 INJECTION, SOLUTION INTRAVENOUS at 08:18

## 2023-03-06 RX ADMIN — Medication 100 MG: at 07:30

## 2023-03-06 RX ADMIN — FENTANYL CITRATE 50 MCG: 50 INJECTION INTRAMUSCULAR; INTRAVENOUS at 11:50

## 2023-03-06 RX ADMIN — DEXMEDETOMIDINE HYDROCHLORIDE 0.3 MCG/KG/HR: 400 INJECTION INTRAVENOUS at 12:08

## 2023-03-06 RX ADMIN — HEPARIN SODIUM 37000 UNITS: 1000 INJECTION INTRAVENOUS; SUBCUTANEOUS at 08:47

## 2023-03-06 RX ADMIN — PHENYLEPHRINE HYDROCHLORIDE 500 MCG: 50 INJECTION INTRAVENOUS at 10:09

## 2023-03-06 RX ADMIN — SODIUM CHLORIDE 3 UNITS/HR: 9 INJECTION, SOLUTION INTRAVENOUS at 14:12

## 2023-03-06 RX ADMIN — HEPARIN SODIUM 5000 UNITS: 1000 INJECTION, SOLUTION INTRAVENOUS; SUBCUTANEOUS at 09:53

## 2023-03-06 RX ADMIN — AMINOCAPROIC ACID 1 G/HR: 250 INJECTION, SOLUTION INTRAVENOUS at 08:20

## 2023-03-06 RX ADMIN — CHLORHEXIDINE GLUCONATE 15 ML: 1.2 SOLUTION ORAL at 05:46

## 2023-03-06 RX ADMIN — SODIUM CHLORIDE, SODIUM GLUCONATE, SODIUM ACETATE, POTASSIUM CHLORIDE AND MAGNESIUM CHLORIDE 500 ML: 526; 502; 368; 37; 30 INJECTION, SOLUTION INTRAVENOUS at 08:17

## 2023-03-06 RX ADMIN — INSULIN HUMAN 3 UNITS: 100 INJECTION, SOLUTION PARENTERAL at 08:56

## 2023-03-06 RX ADMIN — ALBUMIN (HUMAN) 25 G: 12.5 INJECTION, SOLUTION INTRAVENOUS at 20:31

## 2023-03-06 RX ADMIN — CEFAZOLIN SODIUM 2000 MG: 2 SOLUTION INTRAVENOUS at 10:44

## 2023-03-06 RX ADMIN — SODIUM CHLORIDE, SODIUM LACTATE, POTASSIUM CHLORIDE, AND CALCIUM CHLORIDE: .6; .31; .03; .02 INJECTION, SOLUTION INTRAVENOUS at 07:22

## 2023-03-06 RX ADMIN — ALBUMIN (HUMAN) 25 G: 12.5 INJECTION, SOLUTION INTRAVENOUS at 21:32

## 2023-03-06 RX ADMIN — CEFAZOLIN SODIUM 2000 MG: 2 SOLUTION INTRAVENOUS at 07:55

## 2023-03-06 RX ADMIN — OXYCODONE HYDROCHLORIDE 5 MG: 5 TABLET ORAL at 22:51

## 2023-03-06 RX ADMIN — NICARDIPINE HYDROCHLORIDE 5 MG/HR: 2.5 INJECTION, SOLUTION INTRAVENOUS at 12:30

## 2023-03-06 RX ADMIN — LIDOCAINE HYDROCHLORIDE 100 MG: 20 INJECTION INTRAVENOUS at 07:30

## 2023-03-06 RX ADMIN — PROTAMINE SULFATE 340 MG: 10 INJECTION, SOLUTION INTRAVENOUS at 10:39

## 2023-03-06 RX ADMIN — PHENYLEPHRINE HYDROCHLORIDE 120 MCG/MIN: 50 INJECTION INTRAVENOUS at 20:33

## 2023-03-06 RX ADMIN — MUPIROCIN 1 APPLICATION.: 20 OINTMENT TOPICAL at 21:34

## 2023-03-06 RX ADMIN — MAGNESIUM SULFATE HEPTAHYDRATE 2 G: 500 INJECTION, SOLUTION INTRAMUSCULAR; INTRAVENOUS at 10:28

## 2023-03-06 RX ADMIN — PROTAMINE SULFATE 10 MG: 10 INJECTION, SOLUTION INTRAVENOUS at 10:38

## 2023-03-06 RX ADMIN — SODIUM BICARBONATE 50 MEQ: 84 INJECTION, SOLUTION INTRAVENOUS at 08:18

## 2023-03-06 RX ADMIN — PHENYLEPHRINE HYDROCHLORIDE 500 MCG: 50 INJECTION INTRAVENOUS at 09:31

## 2023-03-06 RX ADMIN — AMINOCAPROIC ACID 5 G: 250 INJECTION, SOLUTION INTRAVENOUS at 08:20

## 2023-03-06 RX ADMIN — GLYCOPYRROLATE 0.6 MCG: 0.2 INJECTION, SOLUTION INTRAMUSCULAR; INTRAVENOUS at 11:16

## 2023-03-06 RX ADMIN — PHENYLEPHRINE HYDROCHLORIDE 500 MCG: 50 INJECTION INTRAVENOUS at 10:13

## 2023-03-06 RX ADMIN — ONDANSETRON 4 MG: 2 INJECTION INTRAMUSCULAR; INTRAVENOUS at 19:35

## 2023-03-06 RX ADMIN — FENTANYL CITRATE 50 MCG: 50 INJECTION INTRAMUSCULAR; INTRAVENOUS at 12:36

## 2023-03-06 RX ADMIN — PHENYLEPHRINE HYDROCHLORIDE 30 MCG/MIN: 10 INJECTION INTRAVENOUS at 10:49

## 2023-03-06 RX ADMIN — ALBUMIN (HUMAN) 12.5 G: 12.5 INJECTION, SOLUTION INTRAVENOUS at 17:19

## 2023-03-06 RX ADMIN — DEXMEDETOMIDINE 0.3 MCG/KG/HR: 100 INJECTION, SOLUTION, CONCENTRATE INTRAVENOUS at 08:25

## 2023-03-06 RX ADMIN — ACETAMINOPHEN 975 MG: 325 TABLET ORAL at 21:28

## 2023-03-06 RX ADMIN — SODIUM CHLORIDE, SODIUM LACTATE, POTASSIUM CHLORIDE, AND CALCIUM CHLORIDE 250 ML: .6; .31; .03; .02 INJECTION, SOLUTION INTRAVENOUS at 14:11

## 2023-03-06 RX ADMIN — ATROPINE SULFATE 1 MG: 1 INJECTION, SOLUTION INTRAMUSCULAR; INTRAVENOUS; SUBCUTANEOUS at 08:12

## 2023-03-06 RX ADMIN — CHLORHEXIDINE GLUCONATE 15 ML: 1.2 SOLUTION ORAL at 18:19

## 2023-03-06 NOTE — ANESTHESIA PROCEDURE NOTES
Central Line Insertion  Performed by: Sahara Blackwell MD  Authorized by: Sahaar Blackwell MD     Date/Time: 3/6/2023 7:47 AM  Catheter Type:  triple lumen  Consent: Written consent obtained  Risks and benefits: risks, benefits and alternatives were discussed  Consent given by: patient  Patient understanding: patient states understanding of the procedure being performed  Patient consent: the patient's understanding of the procedure matches consent given  Procedure consent: procedure consent matches procedure scheduled  Required items: required blood products, implants, devices, and special equipment available  Patient identity confirmed: arm band  Time out: Immediately prior to procedure a "time out" was called to verify the correct patient, procedure, equipment, support staff and site/side marked as required    Indications: vascular access and central pressure monitoring  Patient position: Trendelenburg    Sedation:  Patient sedated: yes    Assessment: blood return through all ports and free fluid flow  Preparation: skin prepped with ChloraPrep  Skin prep agent dried: skin prep agent completely dried prior to procedure  Sterile barriers: all five maximum sterile barriers used - cap, mask, sterile gown, sterile gloves, and large sterile sheet  Hand hygiene: hand hygiene performed prior to central venous catheter insertion  sterile gel and probe cover used in ultrasound-guided central venous catheter insertionPre-procedure: landmarks identified  Number of attempts: 1  Successful placement: yes  Post-procedure: chlorhexidine patch applied, dressing applied and line sutured  Patient tolerance: patient tolerated the procedure well with no immediate complications  Comments: Procedure start 0735  Procedure end 7689

## 2023-03-06 NOTE — INTERVAL H&P NOTE
H&P reviewed  After examining the patient I find no changes in the patients condition since the H&P had been written  Vitals:    03/06/23 0529   BP: 113/72   Pulse: 59   Resp: 18   Temp: 98 2 °F (36 8 °C)   SpO2: 99%     Anticipated Length of Stay:  Patient will be admitted on an Inpatient basis with an anticipated length of stay of  greater than 2 midnights  Justification for Hospital Stay:  Post surgical recovery following open heart surgery

## 2023-03-06 NOTE — RESPIRATORY THERAPY NOTE
RT Protocol Note  Bryan Abel 81 y o  male MRN: [id number]  Unit/Bed#: [facility] 414-01 Encounter: [id number]    Assessment    Principal Problem:    Critical aortic valve stenosis  Active Problems:    Impaired fasting glucose    Obesity    Dyslipidemia    Coronary artery disease involving native coronary artery    S/P CABG x 1    S/P AVR (aortic valve replacement)      Home Pulmonary Medications:  none       Past Medical History:   Diagnosis Date    Asthma     Colon, diverticulosis     Nonspecific reaction to 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 active tuberculosis     CXR HAS BEEN CLEAR  Pneumonia      Social History     Socioeconomic History    Marital status: /Civil Union     Spouse name: None    Number of children: None    Years of education: None    Highest education level: None   Occupational History    None   Tobacco Use    Smoking status: Never    Smokeless tobacco: Never   Substance and Sexual Activity    Alcohol use: No    Drug use: No    Sexual activity: None   Other Topics Concern    None   Social History Narrative    None     Social Determinants of Health     Financial Resource Strain: Low Risk     Difficulty of Paying Living Expenses: Not hard at all   Food Insecurity: Not on file   Transportation Needs: No Transportation Needs    Lack of Transportation (Medical): No    Lack of Transportation (Non-Medical): No   Physical Activity: Not on file   Stress: Not on file   Social Connections: Not on file   Intimate Partner Violence: Not on file   Housing Stability: Not on file       Subjective         Objective    Physical Exam:   Assessment Type: Assess only  General Appearance: Sedated  Respiratory Pattern: Assisted  Chest Assessment: Chest expansion symmetrical  Bilateral Breath Sounds: Diminished    Vitals:  Blood pressure 113/72, pulse 59, temperature 98 2 °F (36 8 °C), temperature source Temporal, resp  rate 18, height 5' 6" (1 676 m), weight 94 2 kg (207 lb 9 6 oz), SpO2 94 %            Imaging and other studies: I have personally reviewed pertinent reports  Plan    Respiratory Plan: (P) Vent/NIV/HFNC  Airway Clearance Plan: (P) Incentive Spirometer     Resp Comments: pt received at this time from the OR S/P AVR and placed on SCMV settings  pt tolerating settings well  No acute resp distress noted  Chart reviewed at this time per ACP/RESP protocol  Pt with no pulmonary HX  WIll initiate IS post extubation  Will cont to monitor per resp protocol

## 2023-03-06 NOTE — OP NOTE
OPERATIVE REPORT  PATIENT NAME: Ramsey Bruner    :  1950  MRN: [id number]  Pt Location: [facility] OR ROOM 16    SURGERY DATE: 3/6/2023    SURGEON: Freda Webb DO    ASSISTANT: Valeri Lala PA-C    ADDITIONAL ASSISTANT: N/A    PREOPERATIVE DIAGNOSES:   1  Symptomatic severe aortic stenosis and Bicuspid aortic valve  2  Coronary artery disease    POSTOPERATIVE DIAGNOSES:  1  Symptomatic severe aortic stenosis and Bicuspid aortic valve  2  Coronary artery disease    NYHA: 3  CCS: 3    PROCEDURE:  1  Aortic valve replacement with a 25mm Cortez Inspiris Resilia pericardial tissue valve  2  Coronary artery bypass grafting x 1 with left internal mammary artery to left anterior descending artery    CARDIOPULMONARY BYPASS TIME 84 minutes  CROSSCLAMP TIME 74 minutes  ANESTHESIA General endotracheal anesthesia with transesophageal echocardiogram guidance, Dr Bakari Hayes     INDICATIONS:  The patient is a 81y o  year-old male diagnosed with bicuspid aortic valve with severe aortic insufficiency and single-vessel coronary artery disease with proximal LAD stenosis  FINDINGS:  1  Intraoperative transesophageal echocardiogram revealed Bicuspid valve with severe aortic stenosis  Normal biventricular function  2  Epiaortic ultrasound showed less than 5 mm non-mobile plaque  3  Aortic valve was bicuspid heavily calcified  4  Coronary anatomy as described in coronary angiography  5  Final transesophageal echocardiogram demonstrated prosthetic valve with normal function without evidence of perivalvular leak, and normal biventricular function  OPERATIVE TECHNIQUE:    The patient was taken to the operating room and placed supine on the operating table  Following the satisfactory induction of general anesthesia and placement of monitoring lines, the patient was prepped and draped in the usual sterile fashion  A time-out procedure was performed       The patient underwent median sternotomy, pericardiotomy, left internal mammary artery harvest with the standard technique, systemic heparinization, and conduit preparation  Epiaortic ultrasound showed less than 5 mm non-mobile plaque  Cannulation for cardiopulmonary bypass was performed with an arterial dispersion cannula, double stage venous cannula and a vented antegrade cardioplegia cannula  Once the ACT was greater than 480 seconds we placed the patient on cardiopulmonary bypass, the ascending aorta was crossclamped and cardiac arrest was obtained without complications with [place] cardioplegia  A left ventricular vent was placed through the right superior pulmonary vein while giving cardioplegia  A CO2 flooded field was used during the entire case  The following grafts were completed, left internal mamamry artery to left anterior descending artery with excellent flow  All the distal anastomoses were performed in end-to-side fashion with 7-0 Prolene  Aortotomy was then performed and the aortic valve was exposed  The aortic valve was found to be bicuspid heavily calcified  The leaflets were excised and the annulus was debrided of calcium  The annulus was sized, I selected a 25mm Cortez Inspiris Resilia pericardial tissue valve  Pledgeted 2-0 Ethibond sutures were placed around the aortic valve annulus  The prosthetic valve was brought to the field, the sutures were passed through the sewing ring, the prosthetic valve was seated in a supra annular fashion and the sutures were tied down  Clearance of the coronary ostia was confirmed  Extensive irrigation of the aortic root and LVOT was performed to remove any debris  While de-airing the heart, the aortotomy was closed with 2 layers of running 4-0 Prolene suture  The heart was extensively de-aired, a dose of warm antegrade blood was delivered into the root, and the crossclamp was removed  Atrial and ventricular pacing wires were placed   Following a period of reperfusion, the patient was weaned from cardiopulmonary bypass and decannulated  Protamine was administered with normalization of the ACT  Hemostasis was confirmed in all fields  Thoracostomy tubes were placed  The sternum was closed with stainless steel wires  The fascia, subdermis and skin were closed as multiple layers of running absorbable suture  The assistance of a PA was required to complete this case, specifically for assistance with cannulation, decannulation, construction of the bypass grafts, and exposure during aortic valve replacement  COMPLICATIONS: None  PACKS/TUBES/DRAINS: Chest tubes x 3  SPECIMENS: Aortic valve  EBL: 250Ml  TRANSFUSION: 1 Plts  As the attending surgeon, I was present and scrubbed for all critical portions of this procedure  Sponge, needle and instrument counts were reported as correct by the nursing staff  There was no qualified surgical resident available       SIGNATURE: Kim Thyaer DO  DATE: March 6, 2023  TIME: 11:58 AM

## 2023-03-06 NOTE — ANESTHESIA PROCEDURE NOTES
Introducer/[place]  Performed by: Pedro Thakur MD  Authorized by: Pedro Thakur MD     Date/Time: 3/6/2023 7:47 AM  Consent: Written consent obtained  Risks and benefits: risks, benefits and alternatives were discussed  Consent given by: patient  Patient understanding: patient states understanding of the procedure being performed  Patient consent: the patient's understanding of the procedure matches consent given  Procedure consent: procedure consent matches procedure scheduled  Required items: required blood products, implants, devices, and special equipment available  Patient identity confirmed: arm band  Time out: Immediately prior to procedure a "time out" was called to verify the correct patient, procedure, equipment, support staff and site/side marked as required    Indications: vascular access and central pressure monitoring  Location details: right internal jugular  Patient position: Trendelenburg    Sedation:  Patient sedated: yes    Assessment: blood return through all ports and free fluid flow  Preparation: skin prepped with ChloraPrep  Skin prep agent dried: skin prep agent completely dried prior to procedure  Sterile barriers: all five maximum sterile barriers used - cap, mask, sterile gown, sterile gloves, and large sterile sheet  Hand hygiene: hand hygiene performed prior to central venous catheter insertion  Ultrasound guidance: yes  Pre-procedure: landmarks identified  Number of attempts: 1  Successful placement: yes  Post-procedure: line sutured and dressing applied  Patient tolerance: patient tolerated the procedure well with no immediate complications  Comments: Procedure start 0735  Procedure end [id number]

## 2023-03-06 NOTE — RESPIRATORY THERAPY NOTE
RT Ventilator Management Note  Bryan Abel 81 y o  male MRN: [id number]  Unit/Bed#: [facility] 414-01 Encounter: [id number]      Daily Screen         3/6/2023  1150 3/6/2023  1550          Patient safety screen outcome[de-identified] Failed --      Not Ready for Weaning due to[de-identified] -- --      Spont breathing trial outcome[de-identified] -- Passed      Name of Medical Team Notified[de-identified] -- MD      Preparing to extubate/ Notify Nurse: -- Yes      Extubation order obtained: -- Yes      Consider Cuff Test: -- Yes      Patient extubated: -- Yes      RSBI: -- 59                Physical Exam:   Assessment Type: Assess only  General Appearance: Drowsy  Respiratory Pattern: Normal  Chest Assessment: Chest expansion symmetrical  Bilateral Breath Sounds: Diminished      Resp Comments: pt was extubated at this time per MD order  No stridor heard and clear breath sounds  pt placed on 2L nasal cannula, spo2 WNL  No acute respiratory distress  will continue to monitor per resp protocol

## 2023-03-06 NOTE — CONSULTS
[facility] 81 y o  male MRN: [id number]  Unit/Bed#: [facility] 414-01 Encounter: [id number]    Consults    Physician Requesting Consult: Janeth Duggan DO    Reason for Consult / Principal Problem: Critical aortic valve stenosis    HPI: Edel Stoddard is a 81 y o  male with known AS who was lost to follow up with cardiology, was having mild HURD and PCP ordered echo which revealed progression to critical AS, cardiac cath performed with 90% ostial LAD disease  Was evaluated by CTS and recommend surgical intervention  Arrives to ICU today s/p tissue AVR and CABG x 1 (LIMA to LAD)  Received 1 pack of plts intraop  PMH: AS, HLD, diverticulosis, elevated PSA, impaired fasting BG    History obtained from chart review due to patient being intubated and sedated  ---------------------------------------------------------------------------------------------------------------------------------------------------------------------  Impressions:  1  Critical aortic stenosis s/p tissue AVR   2  CAD s/p CABG x 1  3  HLD   4  Diverticulosis       Plan:    Neuro: Precedex for continuous sedation  ATC tylenol, PRN oxycodone and dilaudid for pain  Trend neuro exam   Delirium precautions  CV: MAP goal >65  SBP goal <130  CI>2 2  Post-op medications: Cardene, 5 mg/hour  Wean as able  Volume resuscitation as needed  Monitor rhythm on telemetry  Epicardial pacing wires in place, currently A pacing for sinus bradycardia  Intra-op FLORIN LVEF 65%  Lung: Check STAT post-op ABG and CXR  Wean vent with spontaneous breathing trial with goal to extubate within 6 hours  GI: GI prophylaxis with PPI  Bowel regimen  Zofran PRN for nausea  FEN: NPO  Replenish K >4 0, mag >2 0 and calcium >7 0  : Check STAT post-op BMP  Batres in place  Monitor UOP with goal >0 5cc/kg/hour  Lasix versus volume resuscitate as needed depending on hemodynamics and volume status  ID: Prophylactic post-op abx   Maintain normothermia  Trend temps  Heme: Check STAT post-op H/H and platelets  Monitor incision site, invasive lines, and chest tube outputs for bleeding  Send coag panel if needed  Endo: Insulin gtt for blood sugar control  Results from last 6 Months   Lab Units 02/25/23  0757 11/29/22  0711   HEMOGLOBIN A1C % 5 4 5 5     Disposition: ICU Care   ---------------------------------------------------------------------------------------------------------------------------------------------------------------------  Historical Information   Past Medical History:   Diagnosis Date   • Asthma    • Colon, diverticulosis    • Nonspecific reaction to 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 active tuberculosis     CXR HAS BEEN CLEAR  • Pneumonia      Past Surgical History:   Procedure Laterality Date   • CARDIAC CATHETERIZATION  02/17/2023    Procedure: Cardiac catheterization;  Surgeon: Mikey Joseph DO;  Location: [facility]; Service: Cardiology   • CARDIAC CATHETERIZATION N/A 02/17/2023    Procedure: Cardiac Coronary Angiogram;  Surgeon: Mikey Joseph DO;  Location: [facility]; Service: Cardiology   • COLONOSCOPY  11/2007    COMPLETE; DONE 11/2007 WITH NO POLYPS, +DIVERTICULA  2/14   • COLONOSCOPY  06/10/2022   • TONSILLECTOMY      LAST ASSESSED: 7/9/14     Social History   Social History     Substance and Sexual Activity   Alcohol Use No     Social History     Substance and Sexual Activity   Drug Use No     Social History     Tobacco Use   Smoking Status Never   Smokeless Tobacco Never     Family History   Problem Relation Age of Onset   • Diabetes Mother         MELLITUS   • Coronary artery disease Mother    • Coronary artery disease Father    • Stroke Father         SYNDROME     I have reviewed this patient's family history and commented on sigificant items within the HPI    ROS: ROS unable to be obtained due to patient being intubated and sedated  Allergies:    Allergies   Allergen Reactions   • Fish-Derived Products - Food Allergy Hives   • Tuberculin, Ppd Rash       Home Medications:   Prior to Admission medications    Medication Sig Start Date End Date Taking? Authorizing Provider   aspirin (ECOTRIN LOW STRENGTH) 81 mg EC tablet Take 1 tablet (81 mg total) by mouth daily 2/17/23  Yes MEI Barrera   desoximetasone (TOPICORT) 0 25 % cream Apply topically 2 (two) times a day prn 2/17/23  Yes MEI Forrester   mupirocin (BACTROBAN) 2 % nasal ointment into each nostril 2 (two) times a day 2/22/23  Yes EusebioLAURIE Burrows-RAVIN   metoprolol succinate (TOPROL-XL) 25 mg 24 hr tablet Take 1 tablet (25 mg total) by mouth daily 2/17/23   MEI Toth   metoprolol tartrate (LOPRESSOR) 25 mg tablet  2/17/23   Historical Provider, MD   mupirocin (BACTROBAN) 2 % ointment INTO EACH NOSTRIL 2 (TWO) TIMES A DAY 2/22/23   Historical Provider, MD   rosuvastatin (CRESTOR) 20 MG tablet Take 1 tablet (20 mg total) by mouth daily 2/17/23   MEI Toth     Inpatient Medications:  Scheduled Meds:   acetaminophen, 975 mg, Q8H  amiodarone, 200 mg, Q8H [address]  aspirin, 325 mg, Daily  atorvastatin, 80 mg, Daily With Dinner  calcium chloride, 1 g, Once  cefazolin, 2,000 mg, Q8H  chlorhexidine, 15 mL, BID  fentanyl citrate (PF), 50 mcg, Once  [START ON 3/7/2023] fondaparinux, 2 5 mg, Daily  magnesium sulfate, 2 g, Once  mupirocin, 1 application  , Q12H [address]  [START ON 3/7/2023] pantoprazole, 40 mg, Early Morning  [START ON 3/7/2023] polyethylene glycol, 17 g, Daily       Continuous Infusions:  aminocaproic acid (AMICAR) IV, 1 g/hr  dexmedetomidine, 0 1-0 7 mcg/kg/hr, Last Rate: 0 3 mcg/kg/hr (03/06/23 1208)  insulin regular (HumuLIN R,NovoLIN R) infusion, 0 3-21 Units/hr  niCARdipine, 2 5-15 mg/hr  sodium chloride, 20 mL/hr      PRN Meds:  acetaminophen, 650 mg, Q4H PRN  bisacodyl, 10 mg, Daily PRN  fentanyl citrate (PF), 50 mcg, Q1H PRN  furosemide, 40 mg, Q6H PRN  HYDROmorphone, 0 5 mg, Q2H PRN  lactated ringers, 500 mL, Q30 Min PRN  lidocaine (cardiac), 100 mg, Q30 Min PRN  ondansetron, 4 mg, Q6H PRN  oxyCODONE, 2 5 mg, Q4H PRN  oxyCODONE, 5 mg, Q4H PRN  potassium chloride, 20 mEq, Once PRN  potassium chloride, 20 mEq, Q1H PRN  potassium chloride, 20 mEq, Q30 Min PRN      ---------------------------------------------------------------------------------------------------------------------------------------------------------------------  Vitals:   Vitals:    23 0529 23 0532 23 0543 23 1150   BP: 113/72      Pulse: 59      Resp: 18      Temp: 98 2 °F (36 8 °C)      TempSrc: Temporal      SpO2: 99%   94%   Weight:   94 2 kg (207 lb 9 6 oz)    Height:  5' 6" (1 676 m)       Arterial Line:          Temperature: Temp (24hrs), Av 2 °F (36 8 °C), Min:98 2 °F (36 8 °C), Max:98 2 °F (36 8 °C)  Current: Temperature: 98 2 °F (36 8 °C)    Weights: IBW (Ideal Body Weight): 63 8 kg  Body mass index is 33 51 kg/m²      Hemodynamic Monitoring:  PAP:  , CVP:  , CO:  , CI:  , SVR:      Ventilator Settings:  Respiratory    Lab Data (Last 4 hours)    None         O2/Vent Data (Last 4 hours)       1150          Patient safety screen outcome: Failed                 Labs:   Results from last 7 days   Lab Units 23  1122 23  1013 23  0955 23  0946   I STAT HEMOGLOBIN g/dl 11 9* 9 2*  --  9 5*   HEMATOCRIT, ISTAT % 35* 27*  --  28*   PLATELETS Thousands/uL  --   --  126*  --      Results from last 7 days   Lab Units 23  1148 23  1122 23  1013 23  0946   SODIUM mmol/L 140  --   --   --    POTASSIUM mmol/L 4 2  --   --   --    CHLORIDE mmol/L 113*  --   --   --    CO2 mmol/L 24  --   --   --    CO2, I-STAT mmol/L  --  25 26 27   BUN mg/dL 14  --   --   --    CREATININE mg/dL 1 01  --   --   --    CALCIUM mg/dL 8 7  --   --   --    GLUCOSE, ISTAT mg/dl  --  138 197* 159*     Post-op AB 2/-3/91%  Post-op CXR: ETT 1 cm above chepe, lines in appropriate position  Widened mediastinum post-operatively  I have personally reviewed pertinent films in PACS  Post-op EKG: Sinus bradycardia rate 52, T wave inversion II, III, aVF, V3-V5  This was personally reviewed by myself  Physical Exam  Vitals reviewed  Constitutional:       General: He is not in acute distress  Appearance: He is obese  He is not ill-appearing or toxic-appearing  Interventions: He is sedated, intubated and restrained  HENT:      Head: Normocephalic and atraumatic  Eyes:      Pupils: Pupils are equal, round, and reactive to light  Neck:      Comments: Right IJ triple-lumen catheter, introducer and Gay-Sheila catheter in place  Cardiovascular:      Rate and Rhythm: Normal rate and regular rhythm  Pulses:           Dorsalis pedis pulses are 2+ on the right side and 2+ on the left side  Heart sounds:     Friction rub present  Arteriovenous access: right arteriovenous access is present  Pulmonary:      Effort: No tachypnea or accessory muscle usage  He is intubated  Breath sounds: Normal breath sounds  Comments: Midline sternotomy incision with dressing in place   CT in place with bloody drainage  Abdominal:      General: Abdomen is protuberant  There is no distension  Palpations: Abdomen is soft  Musculoskeletal:      Right lower leg: No edema  Left lower leg: No edema  Skin:     General: Skin is warm and dry  Invasive lines and devices:   Invasive Devices     Central Venous Catheter Line  Duration           CVC Central Lines 03/06/23 Triple <1 day    Introducer 03/06/23 <1 day          Peripheral Intravenous Line  Duration           Peripheral IV 03/06/23 Right Hand <1 day          Arterial Line  Duration           Arterial Line 03/06/23 Left Radial <1 day          Line  Duration           Pacer Wires <1 day    Pacer Wires <1 day          Drain  Duration           Chest Tube 1 Left Pleural 32 Fr  <1 day    Chest Tube 2 Posterior;Mediastinal 32 Fr  <1 day    Chest Tube 3 Mediastinal;Anterior 32 Fr  <1 day    NG/OG/Enteral Tube Orogastric 18 Fr Center mouth <1 day    Urethral Catheter Non-latex;Straight-tip; Temperature probe 16 Fr  <1 day          Airway  Duration           ETT  Hi-Lo; Cuffed;Oral 8 mm <1 day              ---------------------------------------------------------------------------------------------------------------------------------------------------------------------  Care Time Delivered:   No Critical Care time spent     SIGNATURE: Geoffrey Valdez PA-C  DATE: March 6, 2023  TIME: 12:22 PM

## 2023-03-06 NOTE — ANESTHESIA PREPROCEDURE EVALUATION
Procedure:  AVR / CABG X 1, LIMA (Chest)    Relevant Problems   CARDIO   (+) Coronary artery disease involving native coronary artery   (+) Critical aortic valve stenosis        Physical Exam    Airway    Mallampati score: II         Dental   No notable dental hx     Cardiovascular      Pulmonary      Other Findings        Anesthesia Plan  ASA Score- 4     Anesthesia Type- general with ASA Monitors  Additional Monitors: arterial line, central venous line and pulmonary artery catheter  Airway Plan: ETT  Comment: I, Dr Alix Khan, the attending physician, have personally seen and evaluated the patient prior to anesthetic care  I have reviewed the pre-anesthetic record, and other medical records if appropriate to the anesthetic care  If a CRNA is involved in the case, I have reviewed the CRNA assessment, if present, and agree  The patient is in a suitable condition to proceed with my formulated anesthetic plan          Plan Factors-    Chart reviewed  Induction- intravenous  Postoperative Plan-     Informed Consent- Anesthetic plan and risks discussed with patient  I personally reviewed this patient with the CRNA  Discussed and agreed on the Anesthesia Plan with the CRNA  Singh Wahl Erythromycin Pregnancy And Lactation Text: This medication is Pregnancy Category B and is considered safe during pregnancy. It is also excreted in breast milk.

## 2023-03-06 NOTE — ANESTHESIA POSTPROCEDURE EVALUATION
Post-Op Assessment Note    CV Status:  Stable    Pain management: adequate     Mental Status:  Unresponsive   Hydration Status:  Stable   PONV Controlled:  None   Airway Patency:  Patent  Airway: intubated      Post Op Vitals Reviewed: Yes      Staff: Anesthesiologist         No notable events documented      BP      Temp      Pulse     Resp      SpO2

## 2023-03-06 NOTE — ANESTHESIA PROCEDURE NOTES
Arterial Line Insertion  Performed by: Curly Wren MD  Authorized by: Curly Wren MD   Consent: Written consent obtained  Risks and benefits: risks, benefits and alternatives were discussed  Consent given by: patient  Patient understanding: patient states understanding of the procedure being performed  Patient consent: the patient's understanding of the procedure matches consent given  Procedure consent: procedure consent matches procedure scheduled  Required items: required blood products, implants, devices, and special equipment available  Patient identity confirmed: arm band  Time out: Immediately prior to procedure a "time out" was called to verify the correct patient, procedure, equipment, support staff and site/side marked as required  Preparation: Patient was prepped and draped in the usual sterile fashion    Indications: multiple ABGs and hemodynamic monitoring  Orientation:  Left  Location: radial artery  Sedation:  Patient sedated: no    Procedure Details:  Needle gauge: 20  Number of attempts: 1    Post-procedure:  Post-procedure: chlorhexidine patch applied, dressing applied and line sutured  Waveform: good waveform and waveform confirmed  Patient tolerance: patient tolerated the procedure well with no immediate complications  Comments: Procedure start 0726  Procedure end 0729

## 2023-03-06 NOTE — ANESTHESIA PROCEDURE NOTES
Procedure Performed: FLORIN Anesthesia  Start Time:  3/6/2023 7:48 AM        Preanesthesia Checklist    Patient identified, IV assessed, risks and benefits discussed, monitors and equipment assessed, procedure being performed at surgeon's request and anesthesia consent obtained  Procedure    Diagnostic Indications for FLORIN:  assessment of surgical repair  Type of FLORIN: complete FLORIN with interpretation  Images Saved: ultrasound permanent image saved  Physician Requesting Echo: Yusuf Stewart DO  Location performed: OR  Intubated  Bite block not placed  Heart visualized  Insertion of FLORIN Probe:  Atraumatic  Probe Type:  Multiplane  Modalities:  2D only, color flow mapping, continuous wave Doppler and pulse wave Doppler  Echocardiographic and Doppler Measurements    PREPROCEDURE    LEFT VENTRICLE:  Systolic Function: normal  Ejection Fraction: 65%  Cavity size: normal        RIGHT VENTRICLE:  Systolic Function: normal   Cavity size moderately dilated  No hypertrophy              AORTIC VALVE:  Leaflets: bileaflet  Leaflet motions restricted  Stenosis: severe  Regurgitation: trace  MITRAL VALVE:  Leaflets: normal  Leaflet Motions: normal  Regurgitation: mild  Stenosis: none  TRICUSPID VALVE:  Leaflets: normal  Leaflet Motions: normal  Stenosis: none  Regurgitation: trace  PULMONIC VALVE:  Leaflets: normal  Regurgitation: none  Stenosis: none  ASCENDING AORTA:  Size:  normal   Dissection not present  AORTIC ARCH:  Size:  normal   dissection not present  Grade 2: severe intimal thickening without protruding atheroma  DESCENDING AORTA:  Size: normal   Dissection not present  Grade 2: severe intimal thickening without protruding atheroma          RIGHT ATRIUM:  Size:  normal      LEFT ATRIUM:  Size: normal      LEFT ATRIAL APPENDAGE:  Size: normal           ATRIAL SEPTUM:  Intra-atrial septal morphology: normal           VENTRICULAR SEPTUM:  Intra-ventricular septum morphology: normal          EPIAORTIC:  Plaque Thickness: 0-5 mm  OTHER FINDINGS:  Pericardium:  normal  Pleural Effusion:  none  POSTPROCEDURE    LEFT VENTRICLE: Unchanged   RIGHT VENTRICLE: Unchanged   AORTIC VALVE:   Leaflets: bioprosthetic  Stenosis: none  Mean Gradient: 6 mmHg  Regurgitation: none  Valve Size: 25 mm  MITRAL VALVE: Unchanged   TRICUSPID VALVE: Unchanged   PULMONIC VALVE: Unchanged             ATRIA: Unchanged   AORTA: Unchanged   REMOVAL:  Probe Removal: atraumatic

## 2023-03-07 ENCOUNTER — APPOINTMENT (OUTPATIENT)
Dept: RADIOLOGY | Facility: HOSPITAL | Age: 73
End: 2023-03-07

## 2023-03-07 LAB
ABO GROUP BLD BPU: NORMAL
ANION GAP SERPL CALCULATED.3IONS-SCNC: 5 MMOL/L (ref 4–13)
ATRIAL RATE: 82 BPM
ATRIAL RATE: 87 BPM
BASE EX.OXY STD BLDV CALC-SCNC: 61.5 % (ref 60–80)
BASE EXCESS BLDA CALC-SCNC: -5.8 MMOL/L
BASE EXCESS BLDV CALC-SCNC: -2.4 MMOL/L
BPU ID: NORMAL
BUN SERPL-MCNC: 15 MG/DL (ref 5–25)
CALCIUM SERPL-MCNC: 7.9 MG/DL (ref 8.3–10.1)
CHLORIDE SERPL-SCNC: 115 MMOL/L (ref 96–108)
CO2 SERPL-SCNC: 22 MMOL/L (ref 21–32)
CREAT SERPL-MCNC: 1.14 MG/DL (ref 0.6–1.3)
ERYTHROCYTE [DISTWIDTH] IN BLOOD BY AUTOMATED COUNT: 12.7 % (ref 11.6–15.1)
GFR SERPL CREATININE-BSD FRML MDRD: 64 ML/MIN/1.73SQ M
GLUCOSE SERPL-MCNC: 101 MG/DL (ref 65–140)
GLUCOSE SERPL-MCNC: 137 MG/DL (ref 65–140)
GLUCOSE SERPL-MCNC: 141 MG/DL (ref 65–140)
GLUCOSE SERPL-MCNC: 147 MG/DL (ref 65–140)
GLUCOSE SERPL-MCNC: 150 MG/DL (ref 65–140)
GLUCOSE SERPL-MCNC: 162 MG/DL (ref 65–140)
GLUCOSE SERPL-MCNC: 172 MG/DL (ref 65–140)
HCO3 BLDA-SCNC: 18.7 MMOL/L (ref 22–28)
HCO3 BLDV-SCNC: 23 MMOL/L (ref 24–30)
HCT VFR BLD AUTO: 25.7 % (ref 36.5–49.3)
HGB BLD-MCNC: 8.7 G/DL (ref 12–17)
MAGNESIUM SERPL-MCNC: 2.5 MG/DL (ref 1.6–2.6)
MCH RBC QN AUTO: 32.6 PG (ref 26.8–34.3)
MCHC RBC AUTO-ENTMCNC: 33.9 G/DL (ref 31.4–37.4)
MCV RBC AUTO: 96 FL (ref 82–98)
O2 CT BLDA-SCNC: 12.4 ML/DL (ref 16–23)
O2 CT BLDV-SCNC: 8.2 ML/DL
OXYHGB MFR BLDA: 91.3 % (ref 94–97)
P AXIS: 28 DEGREES
P AXIS: 41 DEGREES
PCO2 BLDA: 32.7 MM HG (ref 36–44)
PCO2 BLDV: 41.8 MM HG (ref 42–50)
PH BLDA: 7.37 [PH] (ref 7.35–7.45)
PH BLDV: 7.36 [PH] (ref 7.3–7.4)
PLATELET # BLD AUTO: 117 THOUSANDS/UL (ref 149–390)
PMV BLD AUTO: 10.3 FL (ref 8.9–12.7)
PO2 BLDA: 66 MM HG (ref 75–129)
PO2 BLDV: 32.3 MM HG (ref 35–45)
POTASSIUM SERPL-SCNC: 3.9 MMOL/L (ref 3.5–5.3)
PR INTERVAL: 126 MS
PR INTERVAL: 132 MS
QRS AXIS: 29 DEGREES
QRS AXIS: 45 DEGREES
QRSD INTERVAL: 86 MS
QRSD INTERVAL: 92 MS
QT INTERVAL: 388 MS
QT INTERVAL: 400 MS
QTC INTERVAL: 466 MS
QTC INTERVAL: 467 MS
RBC # BLD AUTO: 2.67 MILLION/UL (ref 3.88–5.62)
SODIUM SERPL-SCNC: 142 MMOL/L (ref 135–147)
SPECIMEN SOURCE: ABNORMAL
T WAVE AXIS: -20 DEGREES
T WAVE AXIS: -27 DEGREES
UNIT DISPENSE STATUS: NORMAL
UNIT PRODUCT CODE: NORMAL
UNIT PRODUCT VOLUME: 273 ML
UNIT RH: NORMAL
VENTRICULAR RATE: 82 BPM
VENTRICULAR RATE: 87 BPM
WBC # BLD AUTO: 10.8 THOUSAND/UL (ref 4.31–10.16)

## 2023-03-07 RX ORDER — INSULIN LISPRO 100 [IU]/ML
1-6 INJECTION, SOLUTION INTRAVENOUS; SUBCUTANEOUS
Status: DISCONTINUED | OUTPATIENT
Start: 2023-03-07 | End: 2023-03-10 | Stop reason: HOSPADM

## 2023-03-07 RX ORDER — DOCUSATE SODIUM 100 MG/1
100 CAPSULE, LIQUID FILLED ORAL 2 TIMES DAILY
Status: DISCONTINUED | OUTPATIENT
Start: 2023-03-07 | End: 2023-03-10 | Stop reason: HOSPADM

## 2023-03-07 RX ORDER — INSULIN LISPRO 100 [IU]/ML
1-5 INJECTION, SOLUTION INTRAVENOUS; SUBCUTANEOUS
Status: DISCONTINUED | OUTPATIENT
Start: 2023-03-07 | End: 2023-03-10 | Stop reason: HOSPADM

## 2023-03-07 RX ORDER — ASCORBIC ACID 500 MG
1000 TABLET ORAL DAILY
Status: DISCONTINUED | OUTPATIENT
Start: 2023-03-07 | End: 2023-03-10 | Stop reason: HOSPADM

## 2023-03-07 RX ORDER — FONDAPARINUX SODIUM 2.5 MG/.5ML
2.5 INJECTION SUBCUTANEOUS DAILY
Status: DISCONTINUED | OUTPATIENT
Start: 2023-03-07 | End: 2023-03-10 | Stop reason: HOSPADM

## 2023-03-07 RX ORDER — CHLORHEXIDINE GLUCONATE 0.12 MG/ML
15 RINSE ORAL EVERY 12 HOURS SCHEDULED
Status: DISCONTINUED | OUTPATIENT
Start: 2023-03-07 | End: 2023-03-10 | Stop reason: HOSPADM

## 2023-03-07 RX ORDER — FERROUS SULFATE 325(65) MG
325 TABLET ORAL
Status: DISCONTINUED | OUTPATIENT
Start: 2023-03-07 | End: 2023-03-10 | Stop reason: HOSPADM

## 2023-03-07 RX ORDER — FOLIC ACID 1 MG/1
1 TABLET ORAL DAILY
Status: DISCONTINUED | OUTPATIENT
Start: 2023-03-07 | End: 2023-03-10 | Stop reason: HOSPADM

## 2023-03-07 RX ORDER — FUROSEMIDE 10 MG/ML
40 INJECTION INTRAMUSCULAR; INTRAVENOUS
Status: DISCONTINUED | OUTPATIENT
Start: 2023-03-07 | End: 2023-03-09

## 2023-03-07 RX ADMIN — AMIODARONE HYDROCHLORIDE 200 MG: 200 TABLET ORAL at 15:55

## 2023-03-07 RX ADMIN — MUPIROCIN 1 APPLICATION.: 20 OINTMENT TOPICAL at 08:11

## 2023-03-07 RX ADMIN — FUROSEMIDE 40 MG: 10 INJECTION, SOLUTION INTRAMUSCULAR; INTRAVENOUS at 08:11

## 2023-03-07 RX ADMIN — MUPIROCIN 1 APPLICATION.: 20 OINTMENT TOPICAL at 21:12

## 2023-03-07 RX ADMIN — ONDANSETRON 4 MG: 2 INJECTION INTRAMUSCULAR; INTRAVENOUS at 05:03

## 2023-03-07 RX ADMIN — CHLORHEXIDINE GLUCONATE 15 ML: 1.2 SOLUTION ORAL at 21:12

## 2023-03-07 RX ADMIN — DOCUSATE SODIUM 100 MG: 100 CAPSULE, LIQUID FILLED ORAL at 08:10

## 2023-03-07 RX ADMIN — POTASSIUM CHLORIDE 20 MEQ: 14.9 INJECTION, SOLUTION INTRAVENOUS at 06:14

## 2023-03-07 RX ADMIN — FOLIC ACID 1 MG: 1 TABLET ORAL at 08:11

## 2023-03-07 RX ADMIN — PHENYLEPHRINE HYDROCHLORIDE 110 MCG/MIN: 50 INJECTION INTRAVENOUS at 05:59

## 2023-03-07 RX ADMIN — DOCUSATE SODIUM 100 MG: 100 CAPSULE, LIQUID FILLED ORAL at 18:25

## 2023-03-07 RX ADMIN — INSULIN LISPRO 1 UNITS: 100 INJECTION, SOLUTION INTRAVENOUS; SUBCUTANEOUS at 21:14

## 2023-03-07 RX ADMIN — OXYCODONE HYDROCHLORIDE 5 MG: 5 TABLET ORAL at 03:43

## 2023-03-07 RX ADMIN — OXYCODONE HYDROCHLORIDE 5 MG: 5 TABLET ORAL at 20:07

## 2023-03-07 RX ADMIN — OXYCODONE HYDROCHLORIDE AND ACETAMINOPHEN 1000 MG: 500 TABLET ORAL at 08:10

## 2023-03-07 RX ADMIN — AMIODARONE HYDROCHLORIDE 200 MG: 200 TABLET ORAL at 21:12

## 2023-03-07 RX ADMIN — ATORVASTATIN CALCIUM 80 MG: 80 TABLET, FILM COATED ORAL at 15:55

## 2023-03-07 RX ADMIN — ACETAMINOPHEN 975 MG: 325 TABLET ORAL at 21:12

## 2023-03-07 RX ADMIN — PANTOPRAZOLE SODIUM 40 MG: 40 TABLET, DELAYED RELEASE ORAL at 05:49

## 2023-03-07 RX ADMIN — INSULIN LISPRO 1 UNITS: 100 INJECTION, SOLUTION INTRAVENOUS; SUBCUTANEOUS at 16:02

## 2023-03-07 RX ADMIN — FERROUS SULFATE TAB 325 MG (65 MG ELEMENTAL FE) 325 MG: 325 (65 FE) TAB at 08:10

## 2023-03-07 RX ADMIN — AMIODARONE HYDROCHLORIDE 200 MG: 200 TABLET ORAL at 05:49

## 2023-03-07 RX ADMIN — ONDANSETRON 4 MG: 2 INJECTION INTRAMUSCULAR; INTRAVENOUS at 12:21

## 2023-03-07 RX ADMIN — PHENYLEPHRINE HYDROCHLORIDE 30 MCG/MIN: 50 INJECTION INTRAVENOUS at 15:56

## 2023-03-07 RX ADMIN — CEFAZOLIN SODIUM 2000 MG: 2 SOLUTION INTRAVENOUS at 03:31

## 2023-03-07 RX ADMIN — ASPIRIN 325 MG ORAL TABLET 325 MG: 325 PILL ORAL at 08:10

## 2023-03-07 RX ADMIN — ACETAMINOPHEN 975 MG: 325 TABLET ORAL at 05:49

## 2023-03-07 RX ADMIN — FUROSEMIDE 40 MG: 10 INJECTION, SOLUTION INTRAMUSCULAR; INTRAVENOUS at 15:55

## 2023-03-07 RX ADMIN — CEFAZOLIN SODIUM 2000 MG: 2 SOLUTION INTRAVENOUS at 11:30

## 2023-03-07 RX ADMIN — ACETAMINOPHEN 975 MG: 325 TABLET ORAL at 15:55

## 2023-03-07 RX ADMIN — POLYETHYLENE GLYCOL 3350 17 G: 17 POWDER, FOR SOLUTION ORAL at 08:10

## 2023-03-07 NOTE — PLAN OF CARE
Problem: PHYSICAL THERAPY ADULT  Goal: Performs mobility at highest level of function for planned discharge setting  See evaluation for individualized goals  Description: Treatment/Interventions: Functional transfer training, LE strengthening/ROM, Elevations, Therapeutic exercise, Endurance training, Bed mobility, Gait training, Spoke to nursing, OT, Family, Spoke to case management  Equipment Recommended: Desiree Rucker       See flowsheet documentation for full assessment, interventions and recommendations  Note: Prognosis: Good  Problem List: Decreased strength, Decreased endurance, Impaired balance, Decreased mobility  Assessment: Pt is 81 y o  male admitted with Dx of Symptomatic severe aortic stenosis and Bicuspid aortic valve and Coronary artery disease and underwent Aortic valve replacement with a 25mm Cortez Inspiris Resilia pericardial tissue valve and Coronary artery bypass grafting x 1 with left internal mammary artery to left anterior descending artery on 3/6/2023  Pt 's comorbidities affecting POC include: Asthma and impaired fasting glucose and personal factors of: ARTURO and steps in the house  Pt's clinical presentation is currently unstable/unpredictable which is evident in ongoing telem monitoring w/ a-line in place, suppl O2 needs, abn lab values, and CT in place  Pt presents w/ postop guarding, min overall weakness, incl decreased LE strength, decreased functional endurance and activity tolerance, impaired balance and gait deviations (rw was utilized)  Will cont to follow pt in PT for progressive mobilization to address above functional deficits and to max level of (I), endurance, and safety  Otherwise, anticipate pt will return home w/ available family support upon D/C provided he cont improving w/ mobility skills, safety, and endurance and when medically cleared; home PT follow up may need to be considered    Barriers to Discharge: Inaccessible home environment     PT Discharge Recommendation: Home with home health rehabilitation    See flowsheet documentation for full assessment

## 2023-03-07 NOTE — UTILIZATION REVIEW
Initial Clinical Review    Elective Inpatient surgical procedure  Age/Sex: 81 y o  male  Surgery Date: 03/06/2023  Procedure: 1  Aortic valve replacement with a 25mm Cortez Inspiris Resilia pericardial tissue valve  2  Coronary artery bypass grafting x 1 with left internal mammary artery to left anterior descending artery   Anesthesia: General endotracheal anesthesia with transesophageal echocardiogram guidance  Operative Findings: 1  Intraoperative transesophageal echocardiogram revealed Bicuspid valve with severe aortic stenosis  Normal biventricular function  2  Epiaortic ultrasound showed less than 5 mm non-mobile plaque  3  Aortic valve was bicuspid heavily calcified  4  Coronary anatomy as described in coronary angiography  5  Final transesophageal echocardiogram demonstrated prosthetic valve with normal function without evidence of perivalvular leak, and normal biventricular function  POD#1 Progress Note ((03/07/2023): POD # 1 s/p tissue AVR, CABG x1 (LIMA>LAD)     Initial high mediastinal CT output which slowed and then had > 300mL in 1 hour  Associated transient increase in vasopressor requirements  All coags WNL with no further output  Given a total of 1L LR and 1 5L 5% albumin for low filling pressures and increased cori requirements  Wean Pressors as able  Maintain A Line  Maintain Epicardial pacing wires A/V  Maintain chest tubes to suction (-20cm)  Diuretic Plan: IV Lasix, PO KCl, I&O, Daily weight        Admission Orders: Date/Time/Statement:   Admission Orders (From admission, onward)     Ordered        03/06/23 0711  Inpatient Admission  Once                      Orders Placed This Encounter   Procedures   • Inpatient Admission     Standing Status:   Standing     Number of Occurrences:   1     Order Specific Question:   Level of Care     Answer:   Critical Care [15]     Order Specific Question:   Estimated length of stay     Answer:   More than 2 Midnights     Order Specific Question: Certification     Answer:   I certify that inpatient services are medically necessary for this patient for a duration of greater than two midnights  See H&P and MD Progress Notes for additional information about the patient's course of treatment  Vital Signs: /56   Pulse 83   Temp 100 4 °F (38 °C)   Resp (!) 28   Ht 5' 6" (1 676 m)   Wt 97 5 kg (214 lb 15 2 oz)   SpO2 95%   BMI 34 69 kg/m²     Pertinent Labs/Diagnostic Test Results:   XR chest portable ICU   Final Result by Tavia Shepherd DO (03/06 1836)      Lines and tubes as above  Low lung volumes with resultant vascular crowding and subsegmental atelectasis medial right base        XR chest portable    (Results Pending)     Results from last 7 days   Lab Units 03/07/23 0328 03/06/23 2008 03/06/23  1152 03/06/23  1148 03/06/23  1122   WBC Thousand/uL 10 80* 12 06*  --   --   --    HEMOGLOBIN g/dL 8 7* 11 4*  --  11 2*  --    I STAT HEMOGLOBIN g/dl  --   --  12 9  --  11 9*   HEMATOCRIT % 25 7* 33 4*  --  33 7*  --    HEMATOCRIT, ISTAT %  --   --  38  --  35*   PLATELETS Thousands/uL 117* 160  --  125*  --      Results from last 7 days   Lab Units 03/07/23 0328 03/06/23 2005 03/06/23  1602 03/06/23  1152 03/06/23  1148 03/06/23  1122 03/06/23  1013 03/06/23  0946 03/06/23  0926   SODIUM mmol/L 142  --   --   --  140  --   --   --   --    POTASSIUM mmol/L 3 9 4 0 4 4  --  4 2  --   --   --   --    CHLORIDE mmol/L 115*  --   --   --  113*  --   --   --   --    CO2 mmol/L 22  --   --   --  24  --   --   --   --    CO2, I-STAT mmol/L  --   --   --  25  --  25 26 27 25   ANION GAP mmol/L 5  --   --   --  3*  --   --   --   --    BUN mg/dL 15  --   --   --  14  --   --   --   --    CREATININE mg/dL 1 14  --   --   --  1 01  --   --   --   --    EGFR ml/min/1 73sq m 64  --   --   --  75  --   --   --   --    CALCIUM mg/dL 7 9*  --   --   --  8 7  --   --   --   --    CALCIUM, IONIZED, ISTAT mmol/L  --   --   --  1 30  --  1 30 1 10* 1 11* 1 10*   MAGNESIUM mg/dL 2 5  --   --   --   --   --   --   --   --      Results from last 7 days   Lab Units 03/07/23  0551 03/07/23  0345 03/07/23  0200 03/07/23  0003 03/06/23  2202 03/06/23 2013 03/06/23  1814 03/06/23  1609 03/06/23  1410 03/06/23  1148   POC GLUCOSE mg/dl 101 137 147* 141* 128 139 100 149* 176* 151*     Results from last 7 days   Lab Units 03/07/23  0328 03/06/23  1148   GLUCOSE RANDOM mg/dL 150* 159*      Results from last 7 days   Lab Units 03/07/23  0330 03/06/23 2008   PH ART  7 374 7 390   PCO2 ART mm Hg 32 7* 31 1*   PO2 ART mm Hg 66 0* 95 9   HCO3 ART mmol/L 18 7* 18 4*   BASE EXC ART mmol/L -5 8 -5 5   O2 CONTENT ART mL/dL 12 4* 16 4   O2 HGB, ARTERIAL % 91 3* 95 7   ABG SOURCE  Line, Arterial Line, Arterial     Results from last 7 days   Lab Units 03/07/23  0328   PH IJEOMA  7 358   PCO2 IJEOMA mm Hg 41 8*   PO2 IJEOMA mm Hg 32 3*   HCO3 IJEOMA mmol/L 23 0*   BASE EXC IJEOMA mmol/L -2 4   O2 CONTENT IJEOMA ml/dL 8 2   O2 HGB, VENOUS % 61 5     Results from last 7 days   Lab Units 03/06/23  1152 03/06/23  1122 03/06/23  1013 03/06/23  0946 03/06/23  0926 03/06/23  0854 03/06/23  0746   PH, IJEOMA I-STAT   --   --   --   --  7 328  --  7 326   PCO2, IJEOMA ISTAT mm HG  --   --   --   --  45 7  --  48 5   PO2, IJEOMA ISTAT mm HG  --   --   --   --  43 0  --  62 0*   HCO3, IJEOMA ISTAT mmol/L  --   --   --   --  24 0  --  25 3   I STAT BASE EXC mmol/L -3* -3* 0   < > -2   < > -1   I STAT O2 SAT % 91* 91*  --   --  75   < > 89*   ISTAT PH ART  7 313* 7 287* 7 407   < >  --    < >  --    I STAT ART PCO2 mm HG 45 7* 49 0* 40 0   < >  --    < >  --    I STAT ART PO2 mm HG 66 0* 70 0* >400 0*   < >  --    < >  --    I STAT ART HCO3 mmol/L 23 2 23 4 25 2   < >  --    < >  --     < > = values in this interval not displayed       Results from last 7 days   Lab Units 03/06/23 2008 03/06/23  1148   PROTIME seconds 17 0* 17 2*   INR  1 36* 1 38*   PTT seconds 29 32     Results from last 7 days   Lab Units 03/06/23  0742 CLARITY UA  Clear   COLOR UA  Colorless   SPEC GRAV UA  1 006   PH UA  6 0   GLUCOSE UA mg/dl Negative   KETONES UA mg/dl Negative   BLOOD UA  Moderate*   PROTEIN UA mg/dl Negative   NITRITE UA  Negative   BILIRUBIN UA  Negative   UROBILINOGEN UA (BE) mg/dl <2 0   LEUKOCYTES UA  Negative   WBC UA /hpf None Seen   RBC UA /hpf None Seen   BACTERIA UA /hpf None Seen   EPITHELIAL CELLS WET PREP /hpf None Seen     Diet: CV diet w fld restriction 1800ml  Mobility: Up with assistance / Up as tolerated / POD#1 OOB to chair and for all meals  DVT Prophylaxis: VTE Pharmacologic Prophylaxis: Fondaparinux (Arixtra)  VTE Mechanical Prophylaxis: sequential compression device    Medications/Pain Control:   Scheduled Medications:  acetaminophen, 975 mg, Oral, Q8H  amiodarone, 200 mg, Oral, Q8H MARIAMA  ascorbic acid, 1,000 mg, Oral, Daily  aspirin, 325 mg, Oral, Daily  atorvastatin, 80 mg, Oral, Daily With Dinner  cefazolin, 2,000 mg, Intravenous, Q8H  docusate sodium, 100 mg, Oral, BID  ferrous sulfate, 325 mg, Oral, Daily With Breakfast  folic acid, 1 mg, Oral, Daily  furosemide, 40 mg, Intravenous, BID (diuretic)  insulin lispro, 1-5 Units, Subcutaneous, HS  insulin lispro, 1-6 Units, Subcutaneous, TID AC  mupirocin, 1 application  , Nasal, Q12H [address]  pantoprazole, 40 mg, Oral, Early Morning  polyethylene glycol, 17 g, Oral, Daily      Continuous IV Infusions:  insulin regular (HumuLIN R,NovoLIN R) infusion, 0 3-21 Units/hr, Last Rate: Stopped (03/07/23 0552)  niCARdipine, 2 5-15 mg/hr, Last Rate: Stopped (03/06/23 1254)  phenylephine,  mcg/min, Last Rate: 110 mcg/min (03/07/23 0559)  sodium chloride, 20 mL/hr, Last Rate: 20 mL/hr (03/06/23 1200)      PRN Meds:  bisacodyl, 10 mg, Rectal, Daily PRN  ondansetron, 4 mg, Intravenous, Q6H PRN  oxyCODONE, 2 5 mg, Oral, Q4H PRN  oxyCODONE, 5 mg, Oral, Q4H PRN        Network Utilization Review Department  ATTENTION: Please call with any questions or concerns to [phone] and carefully listen to the prompts so that you are directed to the right person  All voicemails are confidential   Steve Gomez all requests for admission clinical reviews, approved or denied determinations and any other requests to dedicated fax number below belonging to the campus where the patient is receiving treatment   List of dedicated fax numbers for the Facilities:  [address] DENIALS (Administrative/Medical Necessity) [phone]   [address] (Maternity/NICU/Pediatrics) [phone]   0 Teresita Rubio [phone]   [facility] [phone]   [address] [place] [address] [facility] [phone]   1559 [facility] 134 [address] [place] [phone]

## 2023-03-07 NOTE — OCCUPATIONAL THERAPY NOTE
Occupational Therapy Evaluation      Theresa Garcia    3/7/2023    Principal Problem:    Critical aortic valve stenosis  Active Problems:    Impaired fasting glucose    Obesity    Dyslipidemia    Coronary artery disease involving native coronary artery    S/P CABG x 1    S/P AVR (aortic valve replacement)      Past Medical History:   Diagnosis Date    Asthma     Colon, diverticulosis     Nonspecific reaction to 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 active tuberculosis     CXR HAS BEEN CLEAR  Pneumonia        Past Surgical History:   Procedure Laterality Date    CARDIAC CATHETERIZATION  02/17/2023    Procedure: Cardiac catheterization;  Surgeon: Becca Bradford DO;  [facility]; Service: Cardiology    CARDIAC CATHETERIZATION N/A 02/17/2023    Procedure: Cardiac Coronary Angiogram;  Surgeon: Becca Bradford DO;  [facility]; Service: Cardiology    COLONOSCOPY  11/2007    COMPLETE; DONE 11/2007 WITH NO POLYPS, +DIVERTICULA  2/14    COLONOSCOPY  06/10/2022    KS RPLCMT AORTIC VALVE OPN W/STENTLESS TISSUE VALVE N/A 3/6/2023    Procedure: AVR WITH 25MM INSPIRIS VALVE/ CABG X 1, LIMA to LAD;  Surgeon: Zoila Malik DO;  Location: [facility];  Service: Cardiac Surgery    TONSILLECTOMY      LAST ASSESSED: 7/9/14 03/07/23 1043   OT Last Visit   OT Visit Date 03/07/23   Note Type   Note type Evaluation   Pain Assessment   Pain Assessment Tool 0-10   Restrictions/Precautions   Other Precautions Cardiac/sternal;Multiple lines;Telemetry;O2;Fall Risk   Home Living   Type of [address] Two level;1/2 bath on main level   Bathroom Shower/Tub Tub/shower unit   Bathroom Toilet Standard   Bathroom Equipment Grab bars in shower   Additional Comments per wife, family has been obtaining a bed for first floor as well as a shower seat   Prior Function   Level of [place] Independent with ADLs; Independent with functional mobility   Lives With Spouse   Receives Help From Family   IADLs Independent with driving; Independent with meal prep; Independent with medication management   Falls in the last 6 months 0   Vocational Retired   Comments 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 [address] pt typically fully indpendent w self care, mobiliy, driving   Reciprocal Relationships supportive family   Intrinsic Gratification spend time w his 15 grand children, travel and staying active   General   Additional Pertinent History per wife, they live active lives, goes traveling once a month etc   Subjective   Subjective I want to be home by thursday   ADL   Grooming Assistance 5  Supervision/Setup   Grooming Deficit Setup; Increased time to complete   UB Bathing Assistance 4  Minimal Assistance   UB Bathing Deficit Increased time to complete   LB Bathing Assistance 4  Minimal Assistance   LB Bathing Deficit Increased time to complete   UB Dressing Assistance 4  Minimal Assistance   UB Dressing Deficit Increased time to complete   LB Dressing Assistance 3  Moderate Assistance   Transfers   Sit to Stand 3  Moderate assistance   Additional items Assist x 1; Increased time required   Stand to Sit 3  Moderate assistance   Additional items Assist x 1; Increased time required   Stand pivot 3  Moderate assistance   Functional Mobility   Functional Mobility 3  Moderate assistance   Additional Comments chair follow + another person assist for line/equipment follow   Additional items Rolling walker   Balance   Static Sitting Fair   Dynamic Sitting Fair -   Static Standing Poor +   Dynamic Standing Poor   Activity Tolerance   Medical Staff Made Aware coeval with PT due to medical complexity   Nurse Made Aware ok to see per RN   RUE Assessment   RUE Assessment WFL   LUE Assessment   LUE Assessment WFL   Hand Function   Gross Motor Coordination Functional   Fine Motor Coordination Functional   Vision - Complex Assessment   Tracking Intact   Psychosocial Psychosocial (WDL) WDL   Cognition   Overall Cognitive Status WFL   Arousal/Participation Alert; Cooperative   Attention Within functional limits   Orientation Level Oriented X4   Memory Within functional limits   Following Commands Follows multistep commands without difficulty   Comments pleasant and cooperative   Assessment   Limitation Decreased ADL status; Decreased endurance;Decreased self-care trans;Decreased high-level ADLs   Assessment Pt is a 81 y o  male seen for OT evaluation s/p admit to [facility] [place] on 3/6/2023 w/ Critical aortic valve stenosis  Pt is now s/p CABG and AVR  Comorbidities affecting pt's functional performance at time of assessment include: obesity and dyslipidemia, aortic valve stenosis  Pt currently with multiple lines, including IV's, chest tubes, corral, a-line  Personal factors affecting pt at time of IE include:difficulty performing ADLS and difficulty performing IADLS   Prior to admission, pt was living w his wife in [address] [place] [address], being fully independent w self care, mobility, driving etc  Pt reports being active prior to this  Upon evaluation: Pt requires min/mod assist self care, mod assist transfers and mobility 2* the following deficits impacting occupational performance: weakness, decreased balance and decreased tolerance  Pt to benefit from continued skilled OT tx while in the hospital to address deficits as defined above and maximize level of functional independence w ADL's and functional mobility  Occupational Performance areas to address include: grooming, bathing/shower, toilet hygiene, dressing, functional mobility and clothing management  Based on findings from OT evaluation and functional performance deficits, pt has been identified as a  high complexity evaluation  The patient's raw score on the AM-PAC Daily Activity inpatient short form is 16, standardized score is 35 96, less than 39 4   Patients at this level are likely to benefit from discharge to post-acute rehabilitation services  From OT standpoint, recommendation at time of d/c would be home w family support and VNA services  anticipate pt will progress nicely w functional activities while in acute care  as patient is currently functioning below baseline  Goals   Patient Goals to be home by thursday   Plan   Treatment Interventions ADL retraining;Functional transfer training; Endurance training;Patient/family training; Compensatory technique education; Energy conservation; Activityengagement;Cardiac education   Goal Expiration Date 03/21/23   OT Frequency 3-5x/wk   Recommendation   OT Discharge Recommendation Home with home health rehabilitation   AM-PAC Daily Activity Inpatient   Lower Body Dressing 2   Bathing 2   Toileting 2   Upper Body Dressing 3   Grooming 3   Eating 4   Daily Activity Raw Score 16   Daily Activity Standardized Score (Calc for Raw Score >=11) 35 96     OT GOALS TO BE ACHIEVED IN 14 DAYS:    Patient will complete bed mobility mod I  With good safety     Pt will demonstrate good balance sitting at EOB x 10 min for increased safety w self care and in preparation for increased indpendence    Pt will complete grooming independently with set up     Pt will complete UB bathing and dressing independently    Pt will complete LB bathing and dressing with supervision    Pt will complete toileting w mod I and good safety     Pt will complete functional transfers mod I w use  of DME as needed demonstrating good safety     Pt will tolerate standing at sinkside x 5 min w F+ balance for increased safety w hygiene    Pt will perform simulated home management activitives w S and good safety     Pt will demonstrate good understanding of cardiac education, the do's and the dont's following cardiac surgery     Pt will complete functional mobility in room and bathroom w S and use of most appropriate DME    Pt will demonstrate good ECT/self pacing skills with all self care and functional mobility    Pt will tolerate 30 min of OT session for increased functional activity tolerance

## 2023-03-07 NOTE — CASE MANAGEMENT
Case Management Assessment & Discharge Planning Note    Patient name Cristo Dolan  [facility]N [id number]  : 1950 Date 3/7/2023       Current Admission Date: 3/6/2023  Current Admission Diagnosis:Critical aortic valve stenosis   Patient Active Problem List    Diagnosis Date Noted   • S/P CABG x 1 2023   • S/P AVR (aortic valve replacement) 2023   • Coronary artery disease involving native coronary artery 2023   • Critical aortic valve stenosis 2023   • Elevated PSA measurement 2018   • Dyslipidemia 2018   • Obesity 2015   • ED (erectile dysfunction) 2014   • Impaired fasting glucose 2012      LOS (days): 1  Geometric Mean LOS (GMLOS) (days):   Days to GMLOS:     OBJECTIVE:    Risk of Unplanned Readmission Score: 12 13         Current admission status: Inpatient       Preferred Pharmacy:   [facility], [address] [address]  Phone: [phone] Fax: 0743 [address] [place] [address] [facility] 94 [facility] 38 210 [facility]  Phone: [phone] Fax: [phone]    Primary Care Provider: Priscila Beckman MD    Primary Insurance: [facility]  Secondary Insurance:     ASSESSMENT:  [place] 26 Proxies    There are no active Health Care Proxies on file  Advance Directives  Does patient have a [address] [place] [address] Avenue?: No  Was patient offered paperwork?: Yes (declined)  Does patient currently have a Health Care decision maker?: Yes, please see Health Care Proxy section  Does patient have Advance Directives?: Yes  Advance Directives: Living will  Primary Contact: wife Nichelle Arreola Obialberto         Readmission Root Cause  30 Day Readmission: No    Patient Information  Admitted from[de-identified] Home  Mental Status: Alert  During Assessment patient was accompanied by: Spouse, Daughter  Assessment information provided by[de-identified] Patient, Spouse  Primary Caregiver: Self  Support Systems: Spouse/significant other, Children (4 adult children)  [place] of Residence: [address] [facility] do you live in?: [address] entry access options   Select all that apply : Stairs  Number of steps to enter home : 2  Do the steps have railings?: Yes  Type of Current Residence: 2 story home  Upon entering residence, is there a bedroom on the main floor (no further steps)?: No (but will have first floor setup)  A bedroom is located on the following floor levels of residence (select all that apply):: 2nd Floor  Upon entering residence, is there a bathroom on the main floor (no further steps)?: Yes  Number of steps to 2nd floor from main floor: One Flight  In the last 12 months, was there a time when you were not able to pay the mortgage or rent on time?: No  In the last 12 months, how many places have you lived?: 1  In the last 12 months, was there a time when you did not have a steady place to sleep or slept in a shelter (including now)?: No  Homeless/housing insecurity resource given?: N/A  Living Arrangements: Lives w/ Spouse/significant other  Is patient a ?: Yes  Is patient active with [facility])?: No    Activities of Daily Living Prior to Admission  Functional Status: Independent  Completes ADLs independently?: Yes  Ambulates independently?: Yes  Does patient use assisted devices?: No  Does patient currently own DME?: No  Does patient have a history of Outpatient Therapy (PT/OT)?: No  Does the patient have a history of Short-Term Rehab?: No  Does patient have a history of HHC?: No  Does patient currently have [facility]?: No         Patient Information Continued  Income Source: Self-employed  Does patient have prescription coverage?: Yes  Within the past 12 months, you worried that your food would run out before you got the money to buy more : Never true  Within the past 12 months, the food you bought just didn't last and you didn't have money to get more : Never true  Food insecurity resource given?: N/A  Does patient receive dialysis treatments?: No  Does patient have a history of substance abuse?: No  Does patient have a history of Mental Health Diagnosis?: No         Means of Transportation  Means of Transport to Appts[de-identified] Drives Self  In the past 12 months, has lack of transportation kept you from medical appointments or from getting medications?: No  In the past 12 months, has lack of transportation kept you from meetings, work, or from getting things needed for daily living?: No  Was application for public transport provided?: N/A        DISCHARGE DETAILS:    Discharge planning discussed with[de-identified] pt and wife--no preference for [address]  Freedom of Choice: Yes     CM contacted family/caregiver?: Yes  Were Treatment Team discharge recommendations reviewed with patient/caregiver?: Yes  Did patient/caregiver verbalize understanding of patient care needs?: Yes  Were patient/caregiver advised of the risks associated with not following Treatment Team discharge recommendations?: Yes    Contacts  Patient Contacts: wife Teri Ramos  Relationship to Patient[de-identified] Family  Contact Method: Phone  Phone Number: [phone]  Reason/Outcome: Continuity of Care, Emergency Contact, Discharge [address] [place]         Is the patient interested in [address] at discharge?: Yes  [address] requested[de-identified] [facility] Provider[de-identified] PCP  Home Health Services Needed[de-identified] Post-Op Care and Assessment  Homebound Criteria Met[de-identified] Requires the Assistance of Another Person for Safe Ambulation or to Leave the Home  Supporting Clincal Findings[de-identified] Limited Endurance, Fatigues Easliy in [employer]         Other Referral/Resources/Interventions Provided:  Interventions: HHC  Referral Comments: No preference for HHC, blanket referrals sent      Would you like to participate in our [address] service program?  : No - Declined       Discharge Destination Plan[de-identified] Home with [place] at Discharge : Family                                      Additional Comments: 71yo male is POD#1 AVR/CABGx1  He is alert but nauseated  Wife and daughter here  Has 3 chest tubes, Neosynephrine gtt, [email or web address] [email or web address]  [place] referrals made for [facility]

## 2023-03-07 NOTE — PHYSICAL THERAPY NOTE
Physical Therapy Evaluation     Patient's Name: Oscar Oates    Admitting Diagnosis  Critical aortic valve stenosis [I35 0]  Coronary artery disease involving native coronary artery of native heart without angina pectoris [I25 10]    Problem List  Patient Active Problem List   Diagnosis    ED (erectile dysfunction)    Impaired fasting glucose    Obesity    Dyslipidemia    Elevated PSA measurement    Critical aortic valve stenosis    Coronary artery disease involving native coronary artery    S/P CABG x 1    S/P AVR (aortic valve replacement)       Past Medical History  Past Medical History:   Diagnosis Date    Asthma     Colon, diverticulosis     Nonspecific reaction to 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 active tuberculosis     CXR HAS BEEN CLEAR  Pneumonia        Past Surgical History  Past Surgical History:   Procedure Laterality Date    CARDIAC CATHETERIZATION  02/17/2023    Procedure: Cardiac catheterization;  Surgeon: Ana María Womack DO;  Location: [facility]; Service: Cardiology    CARDIAC CATHETERIZATION N/A 02/17/2023    Procedure: Cardiac Coronary Angiogram;  Surgeon: Ana María Womack DO;  Location: [facility]; Service: Cardiology    COLONOSCOPY  11/2007    COMPLETE; DONE 11/2007 WITH NO POLYPS, +DIVERTICULA  2/14    COLONOSCOPY  06/10/2022    MO RPLCMT AORTIC VALVE OPN W/STENTLESS TISSUE VALVE N/A 3/6/2023    Procedure: AVR WITH 25MM INSPIRIS VALVE/ CABG X 1, LIMA to LAD;  Surgeon: Moy Nesbitt DO;  Location: [facility];  Service: Cardiac Surgery    TONSILLECTOMY      LAST ASSESSED: 7/9/14 03/07/23 1045   PT Last Visit   PT Visit Date 03/07/23   Note Type   Note type Evaluation   Pain Assessment   Pain Assessment Tool 0-10   Pain Score No Pain   Restrictions/Precautions   Braces or Orthoses   (denies)   Other Precautions Cardiac/sternal;Multiple lines;Telemetry;O2  (CT; a-line)   Home Living   Type of Home House   Home Layout Two level; Able to live on main level with bedroom/bathroom  (3 ARTURO w/ hand rail)   Prior Function   Level of [place] Independent with functional mobility  (amb w/o AD)   Lives With Spouse   General   Additional Pertinent History cleared for assessment by jamie   Family/Caregiver Present Yes   Cognition   Overall Cognitive Status WFL   Arousal/Participation Cooperative   Orientation Level Oriented to person;Oriented to place;Oriented to situation   Memory Decreased recall of precautions   Following Commands Follows one step commands without difficulty   Subjective   Subjective Alert; in the chair; agreeable to mobilize   RUE Assessment   RUE Assessment WFL  (AROM)   LUE Assessment   LUE Assessment WFL  (AROM)   RLE Assessment   RLE Assessment WFL  (AROM)   Strength RLE   RLE Overall Strength   (good - (grossly))   LLE Assessment   LLE Assessment WFL  (AROM)   Strength LLE   LLE Overall Strength   (good - (grossly))   Transfers   Sit to Stand 3  Moderate assistance   Additional items Assist x 1; Increased time required;Verbal cues   Stand to Sit 3  Moderate assistance   Additional items Assist x 1; Increased time required;Verbal cues   Ambulation/Elevation   Gait pattern Excessively slow; Short stride; Inconsistent faustina; Shuffling   Gait Assistance 3  Moderate assist   Additional items Assist x 1;Verbal cues; Tactile cues   Assistive Device Rolling walker   Distance 70 ft   Balance   Static Sitting Fair   Dynamic Sitting Fair -   Static Standing Poor +   Dynamic Standing Poor   Ambulatory Poor   Activity Tolerance   Activity Tolerance Patient limited by fatigue   Medical Staff Made Aware Co-eval performed w/ OTR due to complexity of medical status   Nurse Made Aware spoke to [address]   Assessment   Prognosis Good   Problem List Decreased strength;Decreased endurance; Impaired balance;Decreased mobility   Assessment Pt is 81 y o  male admitted with Dx of Symptomatic severe aortic stenosis and Bicuspid aortic valve and Coronary artery disease and underwent Aortic valve replacement with a 25mm [place] Inspiris Resilia pericardial tissue valve and Coronary artery bypass grafting x 1 with left internal mammary artery to left anterior descending artery on 3/6/2023  Pt 's comorbidities affecting POC include: Asthma and impaired fasting glucose and personal factors of: ARTURO and steps in the house  Pt's clinical presentation is currently unstable/unpredictable which is evident in ongoing telem monitoring w/ a-line in place, suppl O2 needs, abn lab values, and CT in place  Pt presents w/ postop guarding, min overall weakness, incl decreased LE strength, decreased functional endurance and activity tolerance, impaired balance and gait deviations (rw was utilized)  Will cont to follow pt in PT for progressive mobilization to address above functional deficits and to max level of (I), endurance, and safety  Otherwise, anticipate pt will return home w/ available family support upon D/C provided he cont improving w/ mobility skills, safety, and endurance and when medically cleared; home PT follow up may need to be considered  Barriers to Discharge Inaccessible home environment   Goals   Patient Goals to feel better   STG Expiration Date 03/17/23   Short Term Goal #1 7-10 days  Pt will amb 300 ft w/ least restrictive assistive device PRN, mod (I) in order to facilitate safe return to premorbid environment and community amb status  Pt will negotiate 3 --> 14 steps w/ hand rail and SPC PRN, mod (I) in order to navigate in and out of the house and between levels of home environment safely  Pt will achieve (I) level w/ bed mob in order to facilitate safety with OOB and back to bed transitions in own living environment  Pt will perform transfers w/ mod (I) to assure (I) and safety w/ functional mobility/transitions w/ all aspects of mobility/locomotion  Pt will participate in LE therex and balance activities to max progression w/ mobility skills     PT Treatment Day 0   Plan Treatment/Interventions Functional transfer training;LE strengthening/ROM; Elevations; Therapeutic exercise; Endurance training;Bed mobility;Gait training;Spoke to nursing;OT;Family;Spoke to case management   PT Frequency 4-6x/wk   Recommendation   PT Discharge Recommendation Home with home health rehabilitation   Equipment Recommended [place] walker   AM-PAC Basic Mobility Inpatient   Turning in Flat Bed Without Bedrails 3   Lying on Back to Sitting on Edge of Flat Bed Without Bedrails 2   Moving Bed to Chair 2   Standing Up From Chair Using Arms 2   Walk in Room 2   Climb 3-5 Stairs With Railing 2   Basic Mobility Inpatient Raw Score 13   Basic Mobility Standardized Score 33 99   Highest Level Of Mobility   -[facility] Goal 4: Move to chair/commode   -HLM Achieved 7: Walk 25 feet or more   Modified Sera Scale   Modified [place] Scale 4   End of Consult   Patient Position at End of Consult Bedside chair; All needs within reach         [facility], PT

## 2023-03-07 NOTE — PLAN OF CARE
Problem: Prexisting or High Potential for Compromised Skin Integrity  Goal: Skin integrity is maintained or improved  Description: INTERVENTIONS:  - Identify patients at risk for skin breakdown  - Assess and monitor skin integrity  - Assess and monitor nutrition and hydration status  - Monitor labs   - Assess for incontinence   - Turn and reposition patient  - Assist with mobility/ambulation  - Relieve pressure over bony prominences  - Avoid friction and shearing  - Provide appropriate hygiene as needed including keeping skin clean and dry  - Evaluate need for skin moisturizer/barrier cream  - Collaborate with interdisciplinary team   - Patient/family teaching  - Consider wound care consult   Outcome: Progressing     Problem: MOBILITY - ADULT  Goal: Maintain or return to baseline ADL function  Description: INTERVENTIONS:  -  Assess patient's ability to carry out ADLs; assess patient's baseline for ADL function and identify physical deficits which impact ability to perform ADLs (bathing, care of mouth/teeth, toileting, grooming, dressing, etc )  - Assess/evaluate cause of self-care deficits   - Assess range of motion  - Assess patient's mobility; develop plan if impaired  - Assess patient's need for assistive devices and provide as appropriate  - Encourage maximum independence but intervene and supervise when necessary  - Involve family in performance of ADLs  - Assess for home care needs following discharge   - Consider OT consult to assist with ADL evaluation and planning for discharge  - Provide patient education as appropriate  Outcome: Progressing  Goal: Maintains/Returns to pre admission functional level  Description: INTERVENTIONS:  - Perform BMAT or MOVE assessment daily    - Set and communicate daily mobility goal to care team and patient/family/caregiver  - Collaborate with rehabilitation services on mobility goals if consulted  - Perform Range of Motion 4 times a day    - Reposition patient every 2 hours   - Dangle patient 4 times a day  - Stand patient 4 times a day  - Ambulate patient 4 times a day  - Out of bed to chair 3 times a day   - Out of bed for meals 3 times a day  - Out of bed for toileting  - Record patient progress and toleration of activity level   Outcome: Progressing     Problem: PAIN - ADULT  Goal: Verbalizes/displays adequate comfort level or baseline comfort level  Description: Interventions:  - Encourage patient to monitor pain and request assistance  - Assess pain using appropriate pain scale  - Administer analgesics based on type and severity of pain and evaluate response  - Implement non-pharmacological measures as appropriate and evaluate response  - Consider cultural and social influences on pain and pain management  - Notify physician/advanced practitioner if interventions unsuccessful or patient reports new pain  Outcome: Progressing     Problem: INFECTION - ADULT  Goal: Absence or prevention of progression during hospitalization  Description: INTERVENTIONS:  - Assess and monitor for signs and symptoms of infection  - Monitor lab/diagnostic results  - Monitor all insertion sites, i e  indwelling lines, tubes, and drains  - Monitor endotracheal if appropriate and nasal secretions for changes in amount and color  - [place] appropriate cooling/warming therapies per order  - Administer medications as ordered  - Instruct and encourage patient and family to use good hand hygiene technique  - Identify and instruct in appropriate isolation precautions for identified infection/condition  Outcome: Progressing  Goal: Absence of fever/infection during neutropenic period  Description: INTERVENTIONS:  - Monitor WBC    Outcome: Progressing     Problem: SAFETY ADULT  Goal: Maintain or return to baseline ADL function  Description: INTERVENTIONS:  -  Assess patient's ability to carry out ADLs; assess patient's baseline for ADL function and identify physical deficits which impact ability to perform ADLs (bathing, care of mouth/teeth, toileting, grooming, dressing, etc )  - Assess/evaluate cause of self-care deficits   - Assess range of motion  - Assess patient's mobility; develop plan if impaired  - Assess patient's need for assistive devices and provide as appropriate  - Encourage maximum independence but intervene and supervise when necessary  - Involve family in performance of ADLs  - Assess for home care needs following discharge   - Consider OT consult to assist with ADL evaluation and planning for discharge  - Provide patient education as appropriate  Outcome: Progressing  Goal: Maintains/Returns to pre admission functional level  Description: INTERVENTIONS:  - Perform BMAT or MOVE assessment daily    - Set and communicate daily mobility goal to care team and patient/family/caregiver  - Collaborate with rehabilitation services on mobility goals if consulted  - Perform Range of Motion 4 times a day  - Reposition patient every 2 hours    - Dangle patient 4 times a day  - Stand patient 4 times a day  - Ambulate patient 4 times a day  - Out of bed to chair 3 times a day   - Out of bed for meals 3 times a day  - Out of bed for toileting  - Record patient progress and toleration of activity level   Outcome: Progressing  Goal: Patient will remain free of falls  Description: INTERVENTIONS:  - Educate patient/family on patient safety including physical limitations  - Instruct patient to call for assistance with activity   - Consult OT/PT to assist with strengthening/mobility   - Keep Call bell within reach  - Keep bed low and locked with side rails adjusted as appropriate  - Keep care items and personal belongings within reach  - Initiate and maintain comfort rounds  - Make Fall Risk Sign visible to staff  - Offer Toileting every 2 Hours, in advance of need  - Initiate/Maintain alarm  - Obtain necessary fall risk management equipment:   - Apply yellow socks and bracelet for high fall risk patients  - Consider moving patient to room near nurses station  Outcome: Progressing     Problem: DISCHARGE PLANNING  Goal: Discharge to home or other facility with appropriate resources  Description: INTERVENTIONS:  - Identify barriers to discharge w/patient and caregiver  - Arrange for needed discharge resources and transportation as appropriate  - Identify discharge learning needs (meds, wound care, etc )  - Arrange for interpretive services to assist at discharge as needed  - Refer to Case Management Department for coordinating discharge planning if the patient needs post-hospital services based on physician/advanced practitioner order or complex needs related to functional status, cognitive ability, or social support system  Outcome: Progressing     Problem: Knowledge Deficit  Goal: Patient/family/caregiver demonstrates understanding of disease process, treatment plan, medications, and discharge instructions  Description: Complete learning assessment and assess knowledge base    Interventions:  - Provide teaching at level of understanding  - Provide teaching via preferred learning methods  Outcome: Progressing

## 2023-03-07 NOTE — PROGRESS NOTES
Progress Note - Critical Care   Nirmala Smith 81 y o  male MRN: [id number]  Unit/Bed#: [facility] 414-01 Encounter: [id number]      Attending Physician: Katie Velez DO    24 Hour Events/HPI:   POD # 1 s/p tissue AVR, CABG x1 (LIMA>LAD)  Initial high mediastinal CT output which slowed and then had > 300mL in 1 hour  Associated transient increase in vasopressor requirements  All coags WNL with no further output  Given a total of 1L LR and 1 5L 5% albumin for low filling pressures and increased cori requirements  ROS: Review of Systems   Constitutional: Positive for fatigue  Respiratory: Negative for shortness of breath  Cardiovascular:        Chest wall pain     Gastrointestinal: Positive for nausea  Musculoskeletal: Positive for myalgias      ---------------------------------------------------------------------------------------------------------------------------------------------------------------------  Impressions:  1  Critical aortic stenosis s/p tissue AVR  2  CAD s/p CABG x1 (LIMA>LAD)   3  Anemia  4  Thrombocytopenia   5  Hyperlipidemia  6  Diverticulosis       Plan:    Neuro:   · Pain controlled with: Tylenol 975mg q8h, oxycodone 2 5 - 5mg q4h PRN  Discontinue IV dilaudid   · Regulate sleep/wake cycle  · Delirium precautions  · CAM-ICU daily  · Trend neuro exam      CV:   · Cardiac infusions: Neosynephrine, 110 mcg/min  · Wean pressors as able  · Consider 1u PRBC  · MAP goal > 65   · Keep arterial line  · Rhythm: NSR  · Follow rhythm on telemetry  · Hold beta blocker for today  · Maintain epicardial pacing wires for today  ·  mg QD  · Statin: Lipitor 80mg qHS  · Amiodarone 200 mg PO q8 hours       Lung:   · Acute post-op pulmonary insufficiency; Requiring 3 liters via nasal cannula, secondary to atelectasis  Continue incentive spirometry/coughing/deep breathing exercises    Wean supplemental oxygen as tolerated for saturation > 90%  · Wean supplemental O2 as tolerated  · Chlorhexidine ordered: yes  · Chest tube output remains persistently high; Continue chest tubes to suction today      GI:   · Continue PPI for stress ulcer prophylaxis  · Continue bowel regimen  · Trend abdominal exam and bowel function    FEN:   · Diuretic plan: Lasix 40 mg IV q BID  · Replace potassium as indicated  · 20mEq KCL this AM   · Nutrition/diet plan: Cardiac diet  1800mL fluid restriction   · Replenish electrolytes with goals: K >4 0, Mag >2 0, and Phos >3 0    :   · Indwelling Batres present: yes   · Discontinue Batres  · Trend UOP and BUN/creat  · Strict I and O    ID:   · Source of infection: None  · Trend temps and WBC count  · Maintain normothermia        Heme:   · Trend hgb and plts  · Consider 1u PRBC today to help wean vasopressors     Endo:   · Glycemic control plan: Glucose well-controlled  Discontinue continuous insulin infusion and add Insulin sliding scale coverage    Results from last 6 Months   Lab Units 02/25/23  0757 11/29/22  0711   HEMOGLOBIN A1C % 5 4 5 5     MSK/Skin:  · Mobility goal: OOB today  Ambulate in halls   · PT consult: yes  · OT consult: yes  · Frequent turning and pressure off-loading  · Local wound care as needed    Disposition:  Continue ICU care  ---------------------------------------------------------------------------------------------------------------------------------------------------------------------  Allergies: Allergies   Allergen Reactions   • Fish-Derived Products - Food Allergy Hives   • Tuberculin, Ppd Rash       Medications:     VTE Pharmacologic Prophylaxis: Fondaparinux (Arixtra)  VTE Mechanical Prophylaxis: sequential compression device    Scheduled Meds:   acetaminophen, 975 mg, Q8H  amiodarone, 200 mg, Q8H MARIAMA  aspirin, 325 mg, Daily  atorvastatin, 80 mg, Daily With Dinner  calcium chloride, 1 g, Once  cefazolin, 2,000 mg, Q8H  chlorhexidine, 15 mL, BID  fondaparinux, 2 5 mg, Daily  mupirocin, 1 application  , Q12H [address]  pantoprazole, 40 mg, Early Morning  polyethylene glycol, 17 g, Daily       Continuous Infusions:  insulin regular (HumuLIN R,NovoLIN R) infusion, 0 3-21 Units/hr, Last Rate: Stopped (23 0552)  niCARdipine, 2 5-15 mg/hr, Last Rate: Stopped (23 1254)  phenylephine,  mcg/min, Last Rate: 110 mcg/min (23 0559)  sodium chloride, 20 mL/hr, Last Rate: 20 mL/hr (23 1200)      PRN Meds:  acetaminophen, 650 mg, Q4H PRN  bisacodyl, 10 mg, Daily PRN  fentanyl citrate (PF), 50 mcg, Q1H PRN  furosemide, 40 mg, Q6H PRN  HYDROmorphone, 0 5 mg, Q2H PRN  lidocaine (cardiac), 100 mg, Q30 Min PRN  ondansetron, 4 mg, Q6H PRN  oxyCODONE, 2 5 mg, Q4H PRN  oxyCODONE, 5 mg, Q4H PRN  potassium chloride, 20 mEq, Once PRN  potassium chloride, 20 mEq, Q1H PRN  potassium chloride, 20 mEq, Q30 Min PRN      ---------------------------------------------------------------------------------------------------------------------------------------------------------------------  Vitals:   Vitals:    23 0200 23 0300 23 0400 23 0550   BP: 92/50 100/57 98/54    BP Location:   Left arm    Pulse: 82 81 82    Resp: (!) 25 (!) 24 (!) 24    Temp:   (!) 100 9 °F (38 3 °C)    TempSrc:   Core    SpO2: 93% 92% 92%    Weight:    97 5 kg (214 lb 15 2 oz)   Height:         Arterial Line:  Arterial Line BP: 98/44  Arterial Line MAP (mmHg): (!) 60 mmHg    Tele Rhythm: NSR (This was personally reviewed by myself )    Respiratory:  SpO2: SpO2: 92 %  SpO2 Device: O2 Device: Nasal cannula  Nasal Cannula O2 Flow Rate (L/min): 3 L/min    Ventilator:  Respiratory    Lab Data (Last 4 hours)       0330            pH, Arterial       7 374             pCO2, Arterial       32 7             pO2, Arterial       66 0             HCO3, Arterial       18 7             Base Excess, Arterial       -5 8                  O2/Vent Data     None              Temperature: Temp (24hrs), Av 8 °F (37 7 °C), Min:97 2 °F (36 2 °C), Max:101 1 °F (38 4 °C)  Current: Temperature: (!) 100 9 °F (38 3 °C)    Weights:   Weight (last 2 days)     Date/Time Weight    03/07/23 0550 97 5 (214 95)    03/06/23 0543 94 2 (207 6)        Body mass index is 34 69 kg/m²  Hemodynamic Monitoring:  PAP: PAP: 48/11  CVP: CVP (mean): 9 mmHg  CO: CO (L/min): 9 9 L/min  CI: CI (L/min/m2): 4 9 L/min/m2  SVR: SVR (dyne*sec)/cm5: 622 (dyne*sec)/cm5    Intake and Outputs:    Intake/Output Summary (Last 24 hours) at 3/7/2023 0604  Last data filed at 3/7/2023 0400  Gross per 24 hour   Intake 6671 62 ml   Output 3250 ml   Net 3421 62 ml     I/O last 24 hours: In: 6671 6 [I V :3371 6; Blood:200; IV [id number]:3034]  Out: 2089 [Urine:1870; Blood:500; Chest Tube:880]    UOP: 45-55ml/hour   BM: 0 in the last 24 hours    Chest tube Output:  Mediastinal tubes: 170 mL/8 hours  910 mL/24 hours   Pleural tubes: 15 mL/8 hours  15 mL/24 hours     Labs:  Results from last 7 days   Lab Units 03/07/23 0328 03/06/23 2008 03/06/23  1152 03/06/23  1148   WBC Thousand/uL 10 80* 12 06*  --   --    HEMOGLOBIN g/dL 8 7* 11 4*  --  11 2*   I STAT HEMOGLOBIN g/dl  --   --  12 9  --    HEMATOCRIT % 25 7* 33 4*  --  33 7*   HEMATOCRIT, ISTAT %  --   --  38  --    PLATELETS Thousands/uL 117* 160  --  125*     Results from last 7 days   Lab Units 03/07/23 0328 03/06/23 2005 03/06/23 1602 03/06/23 1152 03/06/23 1148 03/06/23  1148 03/06/23  1122 03/06/23  1013   SODIUM mmol/L 142  --   --   --   --  140  --   --    POTASSIUM mmol/L 3 9 4 0 4 4  --    < > 4 2  --   --    CHLORIDE mmol/L 115*  --   --   --   --  113*  --   --    CO2 mmol/L 22  --   --   --   --  24  --   --    CO2, I-STAT mmol/L  --   --   --  25  --   --  25 26   BUN mg/dL 15  --   --   --   --  14  --   --    CREATININE mg/dL 1 14  --   --   --   --  1 01  --   --    CALCIUM mg/dL 7 9*  --   --   --   --  8 7  --   --    GLUCOSE, ISTAT mg/dl  --   --   --  142*  --   --  138 197*    < > = values in this interval not displayed       Baseline Creat: 1  1-1 2    Results from last 7 days   Lab Units 03/07/23  0328   MAGNESIUM mg/dL 2 5          Results from last 7 days   Lab Units 03/06/23 2008 03/06/23  1148   INR  1 36* 1 38*   PTT seconds 29 32     Coumadin mg            Results from last 7 days   Lab Units 03/07/23  0330 03/06/23 2008   PH ART  7 374 7 390   PCO2 ART mm Hg 32 7* 31 1*   PO2 ART mm Hg 66 0* 95 9   HCO3 ART mmol/L 18 7* 18 4*   BASE EXC ART mmol/L -5 8 -5 5   ABG SOURCE  Line, Arterial Line, Arterial         SVO2: 61 5%    Micro:   Blood Culture: No results found for: BLOODCX  Urine Culture: No results found for: URINECX  Sputum Culture: No components found for: SPUTUMCX  Wound Culure: No results found for: WOUNDCULT    Diagnostic Studies:  03/07/23 CXR: Left effusion  Increased bowel gas  This was personally reviewed by myself in PACS   03/07/23 EKG: NSR with lateral T-wave abnormality  This was personally reviewed by myself  Nutrition:        Diet Orders   (From admission, onward)             Start     Ordered    03/07/23 0000  Diet Cardiovascular; Cardiac; Fluid Restriction 1800 ML  Diet effective 0500        References:    Nutrtion Support Algorithm Enteral vs  Parenteral   Question Answer Comment   Diet Type Cardiovascular    Cardiac Cardiac    Other Restriction(s): Fluid Restriction 1800 ML    RD to adjust diet per protocol? Yes        03/06/23 1138              Physical Exam  Vitals reviewed  Constitutional:       General: He is not in acute distress  Interventions: Nasal cannula in place  HENT:      Head: Normocephalic and atraumatic  Eyes:      Pupils: Pupils are equal, round, and reactive to light  Neck:      Comments: IJ TLC CDI  Cardiovascular:      Rate and Rhythm: Normal rate and regular rhythm  No extrasystoles are present  Pulses:           Radial pulses are 2+ on the right side and 2+ on the left side  Dorsalis pedis pulses are 2+ on the right side and 2+ on the left side        Heart sounds: Normal heart sounds  Pulmonary:      Effort: Pulmonary effort is normal  Tachypnea present  Breath sounds: Examination of the right-lower field reveals decreased breath sounds  Examination of the left-lower field reveals decreased breath sounds  Decreased breath sounds present  Comments: 500mL IS  Chest:      Comments: Dressing intact  CT with serosang output  Abdominal:      General: There is no distension  Palpations: Abdomen is soft  Tenderness: There is no abdominal tenderness  Genitourinary:     Comments: Batres: clear, yellow  Musculoskeletal:      Right lower leg: No edema  Left lower leg: No edema  Skin:     General: Skin is warm and dry  Capillary Refill: Capillary refill takes less than 2 seconds  Neurological:      General: No focal deficit present  Mental Status: He is alert  GCS: GCS eye subscore is 4  GCS verbal subscore is 5  GCS motor subscore is 6  Psychiatric:         Behavior: Behavior is cooperative  Invasive lines and devices: Invasive Devices     Central Venous Catheter Line  Duration           CVC Central Lines 03/06/23 Triple <1 day    Introducer 03/06/23 <1 day          Peripheral Intravenous Line  Duration           Peripheral IV 03/06/23 Right Hand <1 day          Arterial Line  Duration           Arterial Line 03/06/23 Left Radial <1 day          Line  Duration           Pacer Wires <1 day    Pacer Wires <1 day          Drain  Duration           Chest Tube 1 Left Pleural 32 Fr  <1 day    Chest Tube 2 Posterior;Mediastinal 32 Fr  <1 day    Chest Tube 3 Mediastinal;Anterior 32 Fr  <1 day    Urethral Catheter Non-latex;Straight-tip; Temperature probe 16 Fr  <1 day              ---------------------------------------------------------------------------------------------------------------------------------------------------------------------  Code Status: Level 1 - Full Code    Care Time Delivered:   No Critical Care time spent     [facility] bedside rounds performed with cardiac surgery attending, critical care attending and bedside RN      SIGNATURE: MEI Moreira  DATE: March 7, 2023  TIME: 6:02 AM

## 2023-03-07 NOTE — PLAN OF CARE
Problem: OCCUPATIONAL THERAPY ADULT  Goal: Performs self-care activities at highest level of function for planned discharge setting  See evaluation for individualized goals  Note: Limitation: Decreased ADL status, Decreased endurance, Decreased self-care trans, Decreased high-level ADLs     Assessment: Pt is a 81 y o  male seen for OT evaluation s/p admit to [facility] on 3/6/2023 w/ Critical aortic valve stenosis  Pt is now s/p CABG and AVR  Comorbidities affecting pt's functional performance at time of assessment include: obesity and dyslipidemia, aortic valve stenosis  Pt currently with multiple lines, including IV's, chest tubes, corral, a-line  Personal factors affecting pt at time of IE include:difficulty performing ADLS and difficulty performing IADLS   Prior to admission, pt was living w his wife in [address] [place] [address], being fully independent w self care, mobility, driving etc  Pt reports being active prior to this  Upon evaluation: Pt requires min/mod assist self care, mod assist transfers and mobility 2* the following deficits impacting occupational performance: weakness, decreased balance and decreased tolerance  Pt to benefit from continued skilled OT tx while in the hospital to address deficits as defined above and maximize level of functional independence w ADL's and functional mobility  Occupational Performance areas to address include: grooming, bathing/shower, toilet hygiene, dressing, functional mobility and clothing management  Based on findings from OT evaluation and functional performance deficits, pt has been identified as a  high complexity evaluation  The patient's raw score on the AM-PAC Daily Activity inpatient short form is 16, standardized score is 35 96, less than 39 4  Patients at this level are likely to benefit from discharge to post-acute rehabilitation services  From OT standpoint, recommendation at time of d/c would be home w family support and VNA services   anticipate pt will progress nicely w functional activities while in acute care  as patient is currently functioning below baseline       OT Discharge Recommendation: Home with home health rehabilitation

## 2023-03-08 ENCOUNTER — TRANSITIONAL CARE MANAGEMENT (OUTPATIENT)
Dept: FAMILY MEDICINE CLINIC | Facility: CLINIC | Age: 73
End: 2023-03-08

## 2023-03-08 LAB
ANION GAP SERPL CALCULATED.3IONS-SCNC: 5 MMOL/L (ref 4–13)
BUN SERPL-MCNC: 25 MG/DL (ref 5–25)
CALCIUM SERPL-MCNC: 8.6 MG/DL (ref 8.3–10.1)
CHLORIDE SERPL-SCNC: 110 MMOL/L (ref 96–108)
CO2 SERPL-SCNC: 23 MMOL/L (ref 21–32)
CREAT SERPL-MCNC: 1.57 MG/DL (ref 0.6–1.3)
ERYTHROCYTE [DISTWIDTH] IN BLOOD BY AUTOMATED COUNT: 13.2 % (ref 11.6–15.1)
GFR SERPL CREATININE-BSD FRML MDRD: 44 ML/MIN/1.73SQ M
GLUCOSE SERPL-MCNC: 139 MG/DL (ref 65–140)
GLUCOSE SERPL-MCNC: 144 MG/DL (ref 65–140)
GLUCOSE SERPL-MCNC: 155 MG/DL (ref 65–140)
GLUCOSE SERPL-MCNC: 220 MG/DL (ref 65–140)
HCT VFR BLD AUTO: 23.8 % (ref 36.5–49.3)
HGB BLD-MCNC: 7.9 G/DL (ref 12–17)
MAGNESIUM SERPL-MCNC: 2.6 MG/DL (ref 1.6–2.6)
MCH RBC QN AUTO: 32.1 PG (ref 26.8–34.3)
MCHC RBC AUTO-ENTMCNC: 33.2 G/DL (ref 31.4–37.4)
MCV RBC AUTO: 97 FL (ref 82–98)
PLATELET # BLD AUTO: 84 THOUSANDS/UL (ref 149–390)
PMV BLD AUTO: 10.9 FL (ref 8.9–12.7)
POTASSIUM SERPL-SCNC: 4 MMOL/L (ref 3.5–5.3)
RBC # BLD AUTO: 2.46 MILLION/UL (ref 3.88–5.62)
SODIUM SERPL-SCNC: 138 MMOL/L (ref 135–147)
WBC # BLD AUTO: 13.28 THOUSAND/UL (ref 4.31–10.16)

## 2023-03-08 RX ADMIN — ATORVASTATIN CALCIUM 80 MG: 80 TABLET, FILM COATED ORAL at 18:11

## 2023-03-08 RX ADMIN — CHLORHEXIDINE GLUCONATE 15 ML: 1.2 SOLUTION ORAL at 21:34

## 2023-03-08 RX ADMIN — DOCUSATE SODIUM 100 MG: 100 CAPSULE, LIQUID FILLED ORAL at 10:15

## 2023-03-08 RX ADMIN — INSULIN LISPRO 2 UNITS: 100 INJECTION, SOLUTION INTRAVENOUS; SUBCUTANEOUS at 12:32

## 2023-03-08 RX ADMIN — FONDAPARINUX SODIUM 2.5 MG: 2.5 INJECTION, SOLUTION SUBCUTANEOUS at 10:14

## 2023-03-08 RX ADMIN — FERROUS SULFATE TAB 325 MG (65 MG ELEMENTAL FE) 325 MG: 325 (65 FE) TAB at 10:15

## 2023-03-08 RX ADMIN — OXYCODONE HYDROCHLORIDE 5 MG: 5 TABLET ORAL at 05:29

## 2023-03-08 RX ADMIN — FUROSEMIDE 40 MG: 10 INJECTION, SOLUTION INTRAMUSCULAR; INTRAVENOUS at 18:12

## 2023-03-08 RX ADMIN — PANTOPRAZOLE SODIUM 40 MG: 40 TABLET, DELAYED RELEASE ORAL at 05:29

## 2023-03-08 RX ADMIN — POLYETHYLENE GLYCOL 3350 17 G: 17 POWDER, FOR SOLUTION ORAL at 10:13

## 2023-03-08 RX ADMIN — OXYCODONE HYDROCHLORIDE AND ACETAMINOPHEN 1000 MG: 500 TABLET ORAL at 10:15

## 2023-03-08 RX ADMIN — ACETAMINOPHEN 975 MG: 325 TABLET ORAL at 05:29

## 2023-03-08 RX ADMIN — MUPIROCIN 1 APPLICATION.: 20 OINTMENT TOPICAL at 21:31

## 2023-03-08 RX ADMIN — DOCUSATE SODIUM 100 MG: 100 CAPSULE, LIQUID FILLED ORAL at 18:11

## 2023-03-08 RX ADMIN — OXYCODONE HYDROCHLORIDE 5 MG: 5 TABLET ORAL at 21:31

## 2023-03-08 RX ADMIN — AMIODARONE HYDROCHLORIDE 200 MG: 200 TABLET ORAL at 05:29

## 2023-03-08 RX ADMIN — INSULIN LISPRO 1 UNITS: 100 INJECTION, SOLUTION INTRAVENOUS; SUBCUTANEOUS at 21:35

## 2023-03-08 RX ADMIN — FOLIC ACID 1 MG: 1 TABLET ORAL at 10:15

## 2023-03-08 RX ADMIN — AMIODARONE HYDROCHLORIDE 200 MG: 200 TABLET ORAL at 18:11

## 2023-03-08 RX ADMIN — MUPIROCIN 1 APPLICATION.: 20 OINTMENT TOPICAL at 10:15

## 2023-03-08 RX ADMIN — ASPIRIN 325 MG ORAL TABLET 325 MG: 325 PILL ORAL at 10:15

## 2023-03-08 RX ADMIN — PHENYLEPHRINE HYDROCHLORIDE 50 MCG/MIN: 50 INJECTION INTRAVENOUS at 07:18

## 2023-03-08 RX ADMIN — CHLORHEXIDINE GLUCONATE 15 ML: 1.2 SOLUTION ORAL at 10:15

## 2023-03-08 RX ADMIN — OXYCODONE HYDROCHLORIDE 5 MG: 5 TABLET ORAL at 10:16

## 2023-03-08 RX ADMIN — FUROSEMIDE 40 MG: 10 INJECTION, SOLUTION INTRAMUSCULAR; INTRAVENOUS at 06:29

## 2023-03-08 NOTE — PROCEDURES
03/08/23    Procedure: Epicardial Pacing Wire removal    Emerson Gowers was returned to bed and informed of mandatory one hour post-procedure bed rest   The assigned nurse was notified  Epicardial pacing wires removed in routine fashion, without incident  The patient tolerated the procedure well  Vital signs ordered  q 15 minutes for one hour, as per protocol      SIGNATURE: Gisela Ortiz PA-C  DATE: March 8, 2023  TIME: 1:22 PM

## 2023-03-08 NOTE — PROGRESS NOTES
Progress Note - Cardiothoracic Surgery   Oscar Oates 81 y o  male MRN: [id number]  Unit/Bed#: [facility] 408-01 Encounter: [id number]    Aortic stenosis, Non-Rheumatic, Coronary artery disease  S/P aortic valve replacement and coronary artery bypass grafting LIMA to LAD; POD # 2      24 Hour Events: Remains on insulin gtt, wean O2  CTs still with high output, ate breakfast  Pain ok    Medications:   Scheduled Meds:  Current Facility-Administered Medications   Medication Dose Route Frequency Provider Last Rate   • acetaminophen  975 mg Oral Q8H SINA TenaNP     • amiodarone  200 mg Oral Q8H [facility] Lisa Mejia, CRNP     • ascorbic acid  1,000 mg Oral Daily Lisa Mejia, CRNP     • aspirin  325 mg Oral Daily Lisa Mejia, CRNP     • atorvastatin  80 mg Oral Daily With [employer], CRNP     • bisacodyl  10 mg Rectal Daily PRN Lisa Mejia, CRNP     • chlorhexidine  15 mL Swish & Spit Q12H [facility] Lisa Mejia CRNP     • docusate sodium  100 mg Oral BID Lisa Simple, CRNP     • ferrous sulfate  325 mg Oral Daily With Breakfast Lisa Mejia, CRNP     • folic acid  1 mg Oral Daily Lisa Mejia, CRNP     • fondaparinux  2 5 mg Subcutaneous Daily Lisa Mejia, CRNP     • furosemide  40 mg Intravenous BID (diuretic) Lisa Mejia, CRNP     • insulin lispro  1-5 Units Subcutaneous HS Lisa Mejia, CRNP     • insulin lispro  1-6 Units Subcutaneous TID AC SINA TenaNP     • mupirocin  1 application   Nasal Q12H [facility] Lisa Mejia, CRNP     • ondansetron  4 mg Intravenous Q6H PRN Santye Simple, CRNP     • oxyCODONE  2 5 mg Oral Q4H PRN Lisa Mejia, CRNP     • oxyCODONE  5 mg Oral Q4H PRN Lisa Simple, CRNP     • pantoprazole  40 mg Oral Early Morning Santye Roberto, CRNP     • phenylephine   mcg/min Intravenous Titrated Lisa Mejia, CRNP 40 mcg/min (03/08/23 0720)   • polyethylene glycol  17 g Oral Daily Corean Brussels, CRNP     • sodium chloride  20 mL/hr Intravenous Continuous Corean Brussels, CRNP Stopped (03/07/23 0800)   • trimethobenzamide  200 mg Intramuscular Q6H PRN Corean Brussels, CRNP       Continuous Infusions:phenylephine,  mcg/min, Last Rate: 40 mcg/min (03/08/23 0720)  sodium chloride, 20 mL/hr, Last Rate: Stopped (03/07/23 0800)      PRN Meds: •  bisacodyl  •  ondansetron  •  oxyCODONE  •  oxyCODONE  •  trimethobenzamide    Vitals:   Vitals:    03/08/23 0000 03/08/23 0200 03/08/23 0400 03/08/23 0600   BP: 99/58 104/56 105/61 96/54   BP Location:   Left arm    Pulse:       Resp:       Temp:   98 °F (36 7 °C)    TempSrc:   Oral    SpO2:       Weight:    84 3 kg (185 lb 13 6 oz)   Height:           Telemetry: NSR; Heart Rate: 83    Respiratory:   SpO2: SpO2: 94 %; 2 LPM    Intake/Output:     Intake/Output Summary (Last 24 hours) at 3/8/2023 0721  Last data filed at 3/8/2023 0600  Gross per 24 hour   Intake 662 15 ml   Output 1693 ml   Net -1030 85 ml    UO 1 1L/24h    Chest tube Output:    Mediastinal tubes: 160 mL/8 hours  190 mL/24 hours   Pleural tubes: 330 mL/8 hours  355 mL/24 hours     Weights:   Weight (last 2 days)     Date/Time Weight    03/08/23 0600 84 3 (185 85)    03/07/23 0550 97 5 (214 95)    03/06/23 0543 94 2 (207 6)            Results:   Results from last 7 days   Lab Units 03/08/23  0524 03/07/23  0328 03/06/23 2008 03/06/23  1152 03/06/23  1148   WBC Thousand/uL 13 28* 10 80* 12 06*  --   --    HEMOGLOBIN g/dL 7 9* 8 7* 11 4*  --  11 2*   I STAT HEMOGLOBIN   --   --   --    < >  --    HEMATOCRIT % 23 8* 25 7* 33 4*  --  33 7*   HEMATOCRIT, ISTAT   --   --   --    < >  --    PLATELETS Thousands/uL  --  117* 160  --  125*    < > = values in this interval not displayed       Results from last 7 days   Lab Units 03/08/23  0524 03/07/23  0328 03/06/23 2005 03/06/23  1602 03/06/23  1152 03/06/23  1148   SODIUM mmol/L 138 142  --   --   --  140   POTASSIUM mmol/L 4 0 3 9 4 0   < >  --  4 2   CHLORIDE mmol/L 110* 115*  --   --   --  113*   CO2 mmol/L 23 22  --   --   --  24   CO2, I-STAT mmol/L  --   --   --   --  25  --    BUN mg/dL 25 15  --   --   --  14   CREATININE mg/dL 1 57* 1 14  --   --   --  1 01   GLUCOSE, ISTAT mg/dl  --   --   --   --  142*  --    CALCIUM mg/dL 8 6 7 9*  --   --   --  8 7    < > = values in this interval not displayed  Results from last 7 days   Lab Units 03/06/23 2008 03/06/23  1148   INR  1 36* 1 38*   PTT seconds 29 32     Point of care glucose: 101 - 172    Studies:  None today      Invasive Lines/Tubes:  Invasive Devices     Central Venous Catheter Line  Duration           CVC Central Lines 03/06/23 Triple 1 day          Peripheral Intravenous Line  Duration           Peripheral IV 03/06/23 Right Hand 2 days          Arterial Line  Duration           Arterial Line 03/06/23 Left Radial 1 day          Line  Duration           Pacer Wires 1 day    Pacer Wires 1 day          Drain  Duration           Chest Tube 1 Left Pleural 32 Fr  1 day    Chest Tube 2 Posterior;Mediastinal 32 Fr  1 day    Chest Tube 3 Mediastinal;Anterior 32 Fr  1 day                Physical Exam:    HEENT/NECK:  Normocephalic  Atraumatic   no jugular venous distention  Cardiac: Regular rate and rhythm and No murmurs/rubs/gallops  Pulmonary:  Breath sounds diminished in the bases bilaterally   Abdomen:  Non-tender, Non-distended and Normal bowel sounds  Incisions: Sternum is stable  Incision is clean, dry, and intact  Extremities: Extremities warm/dry and Trace edema B/L  Neuro: Alert and oriented X 3, Sensation is grossly intact and No focal deficits  Skin: Warm/Dry, without rashes or lesions      Assessment:  Principal Problem:    Critical aortic valve stenosis  Active Problems:    Impaired fasting glucose    Obesity    Dyslipidemia    Coronary artery disease involving native coronary artery    S/P CABG x 1    S/P AVR (aortic valve replacement)       Aortic stenosis, Non-Rheumatic, Coronary artery disease  S/P aortic valve replacement and coronary artery bypass grafting LIMA to LAD; POD # 2    Plan:    1  Cardiac:   NSR; Hypotensive, remains on Ler[place] 40, wean off today  Continue to hold beta blocker  Continue ASA and Statin therapy  Epicardial pacing wires no longer required  Remove today  Maintain central IV access today for IV meds  Continue DVT prophylaxis    2  Pulmonary:   Acute post-op pulmonary insufficiency; Requiring 2 Liters via nasal cannula, secondary to atelectasis and poor inspiratory effort secondary to pain  Continue incentive spirometry/coughing/deep breathing exercises  Wean supplemental oxygen as tolerated for saturation > 90%  Chest tube output remains persistently high; Continue chest tubes to suction today    3  Renal:   Intake/Output net: -1 L/24 hours, UO 1 1L/24hr  Diuretic Regimen:  Continue Lasix 40 mg IV BID  Continue Potassium Chloride 20 mEq PO BID  Post op Creatinine stable; Follow up labs prn    4  Neuro:  Neurologically intact; No active issues  Incisional pain well-controlled  Continue Tylenol, 975 mg PO q 8, standing dose  Continue Oxycodone, 2 5 to 5 mg PO q 4 hours prn pain    5  GI:  Tolerating TLC 2 3 gm sodium diet  Maintain 1800 mL daily fluid restriction   Continue stool softeners and prn suppository  Continue GI prophylaxis    6  Endo:   Glucose well-controlled with sliding scale coverage    7    Hematology:    Post-operative acute blood loss anemia; Hemoglobin 7 9; trend prn and Leukocytosis    Thrombocytopenia - Plt 84, monitor    8     Disposition:      Follow daily PT/OT recommendations regarding home vs  rehab when medically cleared for discharge    VTE Pharmacologic Prophylaxis: Fondaparinux (Arixtra)  VTE Mechanical Prophylaxis: sequential compression device    Collaborative rounds completed with supervising physician  Plan of care discussed with bedside nurse    SIGNATURE: Lachelle Scott Marina Haque PA-C  DATE: March 8, 2023  TIME: 7:21 AM

## 2023-03-08 NOTE — PHYSICAL THERAPY NOTE
PHYSICAL THERAPY NOTE          Patient Name: Edel Stoddard  MZTPR'E Date: 3/8/2023         03/08/23 0945   PT Last Visit   PT Visit Date 03/08/23   Note Type   Note Type Treatment  (incl extended set-up pre and post mobilization)   Pain Assessment   Pain Assessment Tool 0-10   Pain Score No Pain   Restrictions/Precautions   Other Precautions Cardiac/sternal;Multiple lines;Telemetry;O2  (CT; a-line)   General   Chart Reviewed Yes   Additional Pertinent History cleared for Tx session (spoke to jamie)   Response to Previous Treatment Patient with no complaints from previous session  Family/Caregiver Present Yes   Cognition   Overall Cognitive Status WFL   Arousal/Participation Alert; Cooperative   Attention Within functional limits   Orientation Level Oriented to person;Oriented to place;Oriented to situation   Memory Decreased recall of precautions   Following Commands Follows one step commands without difficulty   Subjective   Subjective Alert; in the chair; agreeable to mobilize   Transfers   Sit to Stand 4  Minimal assistance   Additional items Assist x 1;Verbal cues   Stand to Sit 4  Minimal assistance   Additional items Assist x 1;Verbal cues   Ambulation/Elevation   Gait pattern Excessively slow; Short stride; Inconsistent faustina   Gait Assistance 4  Minimal assist   Additional items Assist x 1;Verbal cues; Tactile cues   Assistive Device Rolling walker   Distance 100 ft   Balance   Static Sitting Fair   Dynamic Sitting Fair -   Static Standing Fair -   Dynamic Standing Poor +   Ambulatory Poor +   Activity Tolerance   Activity Tolerance Patient limited by fatigue   Nurse Made Aware spoke to Shawn Yanez RN   Assessment   Prognosis Good   Problem List Decreased strength;Decreased endurance; Impaired balance;Decreased mobility   Assessment Pt demonstrated overall improvement in mobility skills achieving min (A) w/ transfers and amb and ambulating further distance as well; pt still remains to be generally guarded and overall weak and deconditioned --> will cont to follow to address; otherwise, cont to anticipate pt will return home w/ available family support upon D/C provided he cont improving w/ mobility skills, safety, and endurance and when medically cleared; home PT follow up is recommended;   Barriers to Discharge Inaccessible home environment   Goals   Patient Goals to move better   [facility] Expiration Date 03/17/23   PT Treatment Day 1   Plan   Treatment/Interventions Functional transfer training;LE strengthening/ROM; Elevations; Therapeutic exercise; Endurance training;Equipment eval/education; Bed mobility;Gait training;Spoke to nursing   Progress Progressing toward goals   PT Frequency 4-6x/wk   Recommendation   PT Discharge Recommendation Home with home health rehabilitation   Equipment Recommended [place] walker   Change/add to [employer]? No   AM-PAC Basic Mobility Inpatient   Turning in Flat Bed Without Bedrails 3   Lying on Back to Sitting on Edge of Flat Bed Without Bedrails 2   Moving Bed to Chair 3   Standing Up From Chair Using Arms 3   Walk in Room 3   Climb 3-5 Stairs With Railing 2   Basic Mobility Inpatient Raw Score 16   Basic Mobility Standardized Score 38 32   Highest Level Of Mobility   JH-HLM Goal 5: Stand one or more mins   JH-HLM Achieved 7: Walk 25 feet or more   Education   Education Provided Mobility training;Assistive device   Patient Demonstrates verbal understanding   End of Consult   Patient Position at End of Consult Bedside chair; All needs within [facility]

## 2023-03-08 NOTE — PLAN OF CARE
Problem: PHYSICAL THERAPY ADULT  Goal: Performs mobility at highest level of function for planned discharge setting  See evaluation for individualized goals  Description: Treatment/Interventions: Functional transfer training, LE strengthening/ROM, Elevations, Therapeutic exercise, Endurance training, Bed mobility, Gait training, Spoke to nursing, OT, Family, Spoke to case management  Equipment Recommended: Denisse Chaney       See flowsheet documentation for full assessment, interventions and recommendations  Outcome: Progressing  Note: Prognosis: Good  Problem List: Decreased strength, Decreased endurance, Impaired balance, Decreased mobility  Assessment: Pt demonstrated overall improvement in mobility skills achieving min (A) w/ transfers and amb and ambulating further distance as well; pt still remains to be generally guarded and overall weak and deconditioned --> will cont to follow to address; otherwise, cont to anticipate pt will return home w/ available family support upon D/C provided he cont improving w/ mobility skills, safety, and endurance and when medically cleared; home PT follow up is recommended;  Barriers to Discharge: [address] home environment     PT Discharge Recommendation: Home with home health rehabilitation    See flowsheet documentation for full assessment

## 2023-03-09 PROBLEM — D62 ACUTE BLOOD LOSS AS CAUSE OF POSTOPERATIVE ANEMIA: Status: ACTIVE | Noted: 2023-03-09

## 2023-03-09 PROBLEM — N17.9 AKI (ACUTE KIDNEY INJURY) (HCC): Status: ACTIVE | Noted: 2023-03-09

## 2023-03-09 PROBLEM — D69.6 THROMBOCYTOPENIC (HCC): Status: ACTIVE | Noted: 2023-03-09

## 2023-03-09 LAB
ANION GAP SERPL CALCULATED.3IONS-SCNC: 5 MMOL/L (ref 4–13)
BUN SERPL-MCNC: 24 MG/DL (ref 5–25)
CALCIUM SERPL-MCNC: 8.3 MG/DL (ref 8.3–10.1)
CHLORIDE SERPL-SCNC: 108 MMOL/L (ref 96–108)
CO2 SERPL-SCNC: 26 MMOL/L (ref 21–32)
CREAT SERPL-MCNC: 1.4 MG/DL (ref 0.6–1.3)
ERYTHROCYTE [DISTWIDTH] IN BLOOD BY AUTOMATED COUNT: 13.2 % (ref 11.6–15.1)
GFR SERPL CREATININE-BSD FRML MDRD: 50 ML/MIN/1.73SQ M
GLUCOSE SERPL-MCNC: 126 MG/DL (ref 65–140)
GLUCOSE SERPL-MCNC: 139 MG/DL (ref 65–140)
GLUCOSE SERPL-MCNC: 150 MG/DL (ref 65–140)
GLUCOSE SERPL-MCNC: 153 MG/DL (ref 65–140)
GLUCOSE SERPL-MCNC: 158 MG/DL (ref 65–140)
GLUCOSE SERPL-MCNC: 166 MG/DL (ref 65–140)
HCT VFR BLD AUTO: 23.1 % (ref 36.5–49.3)
HGB BLD-MCNC: 7.6 G/DL (ref 12–17)
MCH RBC QN AUTO: 32.1 PG (ref 26.8–34.3)
MCHC RBC AUTO-ENTMCNC: 32.9 G/DL (ref 31.4–37.4)
MCV RBC AUTO: 98 FL (ref 82–98)
PLATELET # BLD AUTO: 79 THOUSANDS/UL (ref 149–390)
PMV BLD AUTO: 11.2 FL (ref 8.9–12.7)
POTASSIUM SERPL-SCNC: 3.8 MMOL/L (ref 3.5–5.3)
RBC # BLD AUTO: 2.37 MILLION/UL (ref 3.88–5.62)
SODIUM SERPL-SCNC: 139 MMOL/L (ref 135–147)
WBC # BLD AUTO: 8.97 THOUSAND/UL (ref 4.31–10.16)

## 2023-03-09 RX ORDER — TORSEMIDE 20 MG/1
20 TABLET ORAL 2 TIMES DAILY
Status: DISCONTINUED | OUTPATIENT
Start: 2023-03-09 | End: 2023-03-10 | Stop reason: HOSPADM

## 2023-03-09 RX ORDER — POTASSIUM CHLORIDE 20 MEQ/1
20 TABLET, EXTENDED RELEASE ORAL 2 TIMES DAILY
Status: DISCONTINUED | OUTPATIENT
Start: 2023-03-09 | End: 2023-03-10 | Stop reason: HOSPADM

## 2023-03-09 RX ADMIN — CHLORHEXIDINE GLUCONATE 15 ML: 1.2 SOLUTION ORAL at 08:57

## 2023-03-09 RX ADMIN — AMIODARONE HYDROCHLORIDE 200 MG: 200 TABLET ORAL at 21:42

## 2023-03-09 RX ADMIN — PANTOPRAZOLE SODIUM 40 MG: 40 TABLET, DELAYED RELEASE ORAL at 06:21

## 2023-03-09 RX ADMIN — ASPIRIN 325 MG ORAL TABLET 325 MG: 325 PILL ORAL at 08:57

## 2023-03-09 RX ADMIN — DOCUSATE SODIUM 100 MG: 100 CAPSULE, LIQUID FILLED ORAL at 17:12

## 2023-03-09 RX ADMIN — MUPIROCIN 1 APPLICATION.: 20 OINTMENT TOPICAL at 08:57

## 2023-03-09 RX ADMIN — POTASSIUM CHLORIDE 20 MEQ: 1500 TABLET, EXTENDED RELEASE ORAL at 17:12

## 2023-03-09 RX ADMIN — FONDAPARINUX SODIUM 2.5 MG: 2.5 INJECTION, SOLUTION SUBCUTANEOUS at 08:57

## 2023-03-09 RX ADMIN — CHLORHEXIDINE GLUCONATE 15 ML: 1.2 SOLUTION ORAL at 21:42

## 2023-03-09 RX ADMIN — MUPIROCIN 1 APPLICATION.: 20 OINTMENT TOPICAL at 21:42

## 2023-03-09 RX ADMIN — INSULIN LISPRO 1 UNITS: 100 INJECTION, SOLUTION INTRAVENOUS; SUBCUTANEOUS at 11:55

## 2023-03-09 RX ADMIN — ATORVASTATIN CALCIUM 80 MG: 80 TABLET, FILM COATED ORAL at 17:12

## 2023-03-09 RX ADMIN — AMIODARONE HYDROCHLORIDE 200 MG: 200 TABLET ORAL at 06:21

## 2023-03-09 RX ADMIN — OXYCODONE HYDROCHLORIDE AND ACETAMINOPHEN 1000 MG: 500 TABLET ORAL at 08:57

## 2023-03-09 RX ADMIN — POTASSIUM CHLORIDE 20 MEQ: 1500 TABLET, EXTENDED RELEASE ORAL at 08:57

## 2023-03-09 RX ADMIN — ACETAMINOPHEN 975 MG: 325 TABLET ORAL at 19:29

## 2023-03-09 RX ADMIN — AMIODARONE HYDROCHLORIDE 200 MG: 200 TABLET ORAL at 13:58

## 2023-03-09 RX ADMIN — DOCUSATE SODIUM 100 MG: 100 CAPSULE, LIQUID FILLED ORAL at 08:57

## 2023-03-09 RX ADMIN — FOLIC ACID 1 MG: 1 TABLET ORAL at 08:57

## 2023-03-09 RX ADMIN — FERROUS SULFATE TAB 325 MG (65 MG ELEMENTAL FE) 325 MG: 325 (65 FE) TAB at 09:06

## 2023-03-09 RX ADMIN — POLYETHYLENE GLYCOL 3350 17 G: 17 POWDER, FOR SOLUTION ORAL at 08:57

## 2023-03-09 RX ADMIN — TORSEMIDE 20 MG: 20 TABLET ORAL at 17:12

## 2023-03-09 NOTE — PROGRESS NOTES
Progress Note - Cardiothoracic Surgery   Bharti Carpenter 81 y o  male MRN: [id number]  Unit/Bed#: [facility] 408-01 Encounter: [id number]    Aortic stenosis, Non-Rheumatic, Coronary artery disease  S/P aortic valve replacement and coronary artery bypass grafting LIMA to LAD; POD # 3      24 Hour Events: Doing well  Still on 2L NC  Still with CTs in place  Ate breakfast, no BM yet  Pain controlled with pain meds  Medications:   Scheduled Meds:  Current Facility-Administered Medications   Medication Dose Route Frequency Provider Last Rate   • acetaminophen  975 mg Oral Q8H MEI Padilla     • amiodarone  200 mg Oral [facility] MEI Pdailla     • ascorbic acid  1,000 mg Oral Daily MEI Padilla     • aspirin  325 mg Oral Daily MEI Padilla     • atorvastatin  80 mg Oral Daily With [employer]     • bisacodyl  10 mg Rectal Daily PRN MEI Padilla     • chlorhexidine  15 mL Swish & Spit Q12H [facility] HOME MEI Padilla     • docusate sodium  100 mg Oral BID MEI Padilla     • ferrous sulfate  325 mg Oral Daily With Breakfast MEI Padilla     • folic acid  1 mg Oral Daily MEI Padilla     • fondaparinux  2 5 mg Subcutaneous Daily MEI Padilla     • furosemide  40 mg Intravenous BID (diuretic) MEI Padilla     • insulin lispro  1-5 Units Subcutaneous HS MEI Padilla     • insulin lispro  1-6 Units Subcutaneous TID AC MEI Tena     • mupirocin  1 application   Nasal Q12H [facility] HOME MEI Padilla     • ondansetron  4 mg Intravenous Q6H PRN MEI Padilla     • oxyCODONE  2 5 mg Oral Q4H PRN MEI Padilla     • oxyCODONE  5 mg Oral Q4H PRN MEI Padilla     • pantoprazole  40 mg Oral Early Morning MEI Padilla     • phenylephine   mcg/min Intravenous Titrated Eddie Baca CRNP Stopped (03/09/23 0013)   • polyethylene glycol  17 g Oral Daily Eddie Phuong, CRNP     • sodium chloride  20 mL/hr Intravenous Continuous Eddie Phuong, CRNP Stopped (03/07/23 0800)   • trimethobenzamide  200 mg Intramuscular Q6H PRN Eddie Phuong, CRNP       Continuous Infusions:phenylephine,  mcg/min, Last Rate: Stopped (03/09/23 0013)  sodium chloride, 20 mL/hr, Last Rate: Stopped (03/07/23 0800)      PRN Meds: •  bisacodyl  •  ondansetron  •  oxyCODONE  •  oxyCODONE  •  trimethobenzamide    Vitals:   Vitals:    03/08/23 2322 03/09/23 0412 03/09/23 0600 03/09/23 0656   BP: 90/66 118/66  113/59   BP Location:  Left arm  Left arm   Pulse: 94 91  92   Resp:  18  20   Temp: 98 7 °F (37 1 °C) 98 2 °F (36 8 °C)  98 1 °F (36 7 °C)   TempSrc: Oral Oral  Oral   SpO2: 96%   97%   Weight:   80 6 kg (177 lb 11 1 oz)    Height:           Telemetry: NSR; Heart Rate: 95    Respiratory:   SpO2: SpO2: 97 %; 1L NC    Intake/Output:     Intake/Output Summary (Last 24 hours) at 3/9/2023 0721  Last data filed at 3/9/2023 0400  Gross per 24 hour   Intake 467 15 ml   Output 3220 ml   Net -2752  85 ml    UO 2 5L/24h    Chest tube Output:    Mediastinal tubes: 30 mL/8 hours  170 mL/24 hours   Pleural tubes: 70 mL/8 hours  150 mL/24 hours     Weights:   Weight (last 2 days)     Date/Time Weight    03/09/23 0600 80 6 (177 69)    03/08/23 0600 84 3 (185 85)    03/07/23 0550 97 5 (214 95)            Results:   Results from last 7 days   Lab Units 03/09/23  0437 03/08/23  0524 03/07/23  0328   WBC Thousand/uL 8 97 13 28* 10 80*   HEMOGLOBIN g/dL 7 6* 7 9* 8 7*   HEMATOCRIT % 23 1* 23 8* 25 7*   PLATELETS Thousands/uL 79* 84* 117*     Results from last 7 days   Lab Units 03/09/23  0437 03/08/23  0524 03/07/23  0328 03/06/23  1602 03/06/23  1152   SODIUM mmol/L 139 138 142  --   --    POTASSIUM mmol/L 3 8 4 0 3 9   < >  --    CHLORIDE mmol/L 108 110* 115*  --   --    CO2 mmol/L 26 23 22  --   --    CO2, I-STAT mmol/L  --   --   --   --  25   BUN mg/dL 24 25 15  --   --    CREATININE mg/dL 1 40* 1 57* 1 14  --   --    GLUCOSE, ISTAT mg/dl  --   --   --   --  142*   CALCIUM mg/dL 8 3 8 6 7 9*  --   --     < > = values in this interval not displayed  Results from last 7 days   Lab Units 03/06/23 2008 03/06/23  1148   INR  1 36* 1 38*   PTT seconds 29 32     Point of care glucose: 139 - 220    Studies:  None today      Invasive Lines/Tubes:  Invasive Devices     Central Venous Catheter Line  Duration           CVC Central Lines 03/06/23 Triple 2 days          Peripheral Intravenous Line  Duration           Peripheral IV 03/06/23 Right Hand 3 days          Drain  Duration           Chest Tube 1 Left Pleural 32 Fr  2 days    Chest Tube 2 Posterior;Mediastinal 32 Fr  2 days    Chest Tube 3 Mediastinal;Anterior 32 Fr  2 days                Physical Exam:    HEENT/NECK:  Normocephalic  Atraumatic   no jugular venous distention  Cardiac: Regular rate and rhythm and No murmurs/rubs/gallops  Pulmonary:  Breath sounds diminished in the bases bilaterally   Abdomen:  Non-tender, Non-distended and Normal bowel sounds  Incisions: Sternum is stable  Incision is clean, dry, and intact  Extremities: Extremities warm/dry and Trace edema B/L  Neuro: Alert and oriented X 3, Sensation is grossly intact and No focal deficits  Skin: Warm/Dry, without rashes or lesions  Assessment:  Principal Problem:    Critical aortic valve stenosis  Active Problems:    Impaired fasting glucose    Obesity    Dyslipidemia    Coronary artery disease involving native coronary artery    S/P CABG x 1    S/P AVR (aortic valve replacement)       Aortic stenosis, Non-Rheumatic, Coronary artery disease  S/P aortic valve replacement and coronary artery bypass grafting LIMA to LAD; POD # 3    Plan:    1   Cardiac:   NSR; HR/BP stable, off Leroy  Start low dose BB today, monitor BP for tolerance  Continue ASA and Statin therapy  Epicardial pacing wires out  Maintain central IV access today for IV meds  Continue DVT prophylaxis    2  Pulmonary:   Acute post-op pulmonary insufficiency; Requiring 1L NC, secondary to atelectasis and poor inspiratory effort secondary to pain  Continue incentive spirometry/coughing/deep breathing exercises  Wean supplemental oxygen as tolerated for saturation > 90%  Chest tube drainage diminished; D/C today    3  Renal:   Intake/Output net: -2 7 L/24 hours, UO 2 5L/24hr  Diuretic Regimen:  Change to Torsemide 20mg PO BID  Post op Creatinine stable; Follow up labs prn    4  Neuro:  Neurologically intact; No active issues  Incisional pain well-controlled  Continue Tylenol, 975 mg PO q 8, standing dose  Continue Oxycodone, 2 5 to 5 mg PO q 4 hours prn pain    5  GI:  Tolerating TLC 2 3 gm sodium diet  Maintain 1800 mL daily fluid restriction   Continue stool softeners and prn suppository  Continue GI prophylaxis    6  Endo:   Continue sliding scale coverage, Blood sugars high today    7    Hematology:    Post-operative acute blood loss anemia; Hemoglobin 7 6; trend prn and Thrombocytopenia    Thrombocytopenia - Plt 79 (84), monitor    8     Disposition:      Follow daily PT/OT recommendations regarding home vs  rehab when medically cleared for discharge    VTE Pharmacologic Prophylaxis: Fondaparinux (Arixtra)  VTE Mechanical Prophylaxis: sequential compression device    Collaborative rounds completed with supervising physician  Plan of care discussed with bedside nurse    SIGNATURE: Ghassan Loza PA-C  DATE: March 9, 2023  TIME: 7:21 AM

## 2023-03-09 NOTE — RESTORATIVE TECHNICIAN NOTE
Restorative Technician Note      Patient Name: Dorys Herndon     Restorative Tech Visit Date: 03/09/23  Note Type: Mobility  Patient Position Upon Consult: Bedside chair  Activity Performed: Ambulated  Assistive Device: Roller walker  Patient Position at End of Consult: All needs within reach;  Bedside chair

## 2023-03-09 NOTE — PHYSICAL THERAPY NOTE
PHYSICAL THERAPY NOTE          Patient Name: Jarrod GAVIN Date: 3/9/2023         03/09/23 1336   PT Last Visit   PT Visit Date 03/09/23   Note Type   Note Type Treatment   Pain Assessment   Pain Assessment Tool 0-10   Pain Score No Pain   Restrictions/Precautions   Other Precautions Cardiac/sternal;Telemetry   General   Chart Reviewed Yes   Additional Pertinent History cleared for Tx session by jamie   Response to Previous Treatment Patient with no complaints from previous session  Family/Caregiver Present Yes   Cognition   Overall Cognitive Status WFL   Arousal/Participation Alert; Cooperative   Attention Within functional limits   Orientation Level Oriented to person;Oriented to place;Oriented to situation   Memory Decreased recall of precautions   Following Commands Follows one step commands without difficulty   Subjective   Subjective Alert; in the chair; agreeable to amb and try steps (ARTURO; otherwise plans to stay on the 1st floor)   Transfers   Sit to Stand 5  Supervision   Stand to Sit 5  Supervision   Ambulation/Elevation   Gait pattern Excessively slow; Short stride  (no overt LOB, knee buckling or swaying)   Gait Assistance 5  Supervision   Assistive Device Rolling walker   Distance 2 x 20 ft w/ steps negotiation in between; after seated rest period, another 100 ft   Stair Management Assistance 4  Minimal assist   Additional items Assist x 1;Verbal cues; Tactile cues  (reviewed sequencing and UE precaution on the hand rail; reviewed rw set-up prior to steps at home)   Stair Management Technique One rail R;Step to pattern; Foreward;Backward;Nonreciprocal   Number of Stairs 3  (1 step x 3 trials)   Balance   Static Sitting Good   Dynamic Sitting Fair +   Static Standing Fair   Dynamic Standing Fair   Ambulatory Fair -   Activity Tolerance   Activity Tolerance Patient limited by fatigue; Other (Comment)  (small amount of sanguinous dranage noted from the prior CT site/dressing)   Nurse Made Aware spoke to Shawn Yanez RN and Rain Schofield, RN   Exercises   Knee AROM Long Arc Quad Sitting;15 reps;AROM; Bilateral   Ankle Pumps Sitting;15 reps;AROM; Bilateral   Marching Sitting;10 reps;AROM; Bilateral   Assessment   Prognosis Good   Problem List Decreased strength;Decreased endurance; Impaired balance;Decreased mobility   Assessment Pt cont improving w/ all aspects of mobility progressing to (S) level w/ transfers and amb and min (A) on the steps; at this time, cont to anticipate pt will return home w/ family support upon D/C when medically cleared; home PT follow up is recommended; will follow   Barriers to Discharge None   Goals   Patient Goals to go home   STG Expiration Date 03/17/23   PT Treatment Day 2   Plan   Treatment/Interventions Functional transfer training;LE strengthening/ROM; Elevations; Therapeutic exercise; Endurance training;Bed mobility;Gait training;Spoke to nursing;Spoke to case management; Family   Progress Improving as expected   PT Frequency 4-6x/wk   Recommendation   PT Discharge Recommendation Home with home health rehabilitation  (1st floor set-up)   Equipment Recommended [facility] Recommended Wheeled walker   Change/add to [employer]? No   AM-PAC Basic Mobility Inpatient   Turning in Flat Bed Without Bedrails 4   Lying on Back to Sitting on Edge of Flat Bed Without Bedrails 3   Moving Bed to Chair 3   Standing Up From Chair Using Arms 3   Walk in Room 3   Climb 3-5 Stairs With Railing 3   Basic Mobility Inpatient Raw Score 19   Basic Mobility Standardized Score 42 48   Highest Level Of Mobility   JH-HLM Goal 6: Walk 10 steps or more   JH-HLM Achieved 7: Walk 25 feet or more   Education   Education Provided Home exercise program;Assistive device; Mobility training   Patient Demonstrates verbal understanding   End of Consult   Patient Position at End of Consult Bedside chair; All needs within reach     [facility] [employer]

## 2023-03-09 NOTE — PLAN OF CARE
Problem: PHYSICAL THERAPY ADULT  Goal: Performs mobility at highest level of function for planned discharge setting  See evaluation for individualized goals  Description: Treatment/Interventions: Functional transfer training, LE strengthening/ROM, Elevations, Therapeutic exercise, Endurance training, Bed mobility, Gait training, Spoke to nursing, OT, Family, Spoke to case management  Equipment Recommended: Elvira Camargo       See flowsheet documentation for full assessment, interventions and recommendations  Outcome: Adequate for Discharge  Note: Prognosis: Good  Problem List: Decreased strength, Decreased endurance, Impaired balance, Decreased mobility  Assessment: Pt cont improving w/ all aspects of mobility progressing to (S) level w/ transfers and amb and min (A) on the steps; at this time, cont to anticipate pt will return home w/ family support upon D/C when medically cleared; home PT follow up is recommended; will follow  Barriers to Discharge: None     PT Discharge Recommendation: Home with home health rehabilitation (1st floor set-up)    See flowsheet documentation for full assessment

## 2023-03-09 NOTE — RESTORATIVE TECHNICIAN NOTE
Restorative Technician Note      Patient Name: Gorge Feldman     Restorative Tech Visit Date: 03/09/23  Note Type: Mobility  Patient Position Upon Consult: Bedside chair  Activity Performed: Ambulated  Assistive Device: Roller walker  Patient Position at End of Consult: All needs within reach;  Bedside chair

## 2023-03-09 NOTE — PLAN OF CARE
Problem: OCCUPATIONAL THERAPY ADULT  Goal: Performs self-care activities at highest level of function for planned discharge setting  See evaluation for individualized goals  Note: Limitation: Decreased ADL status, Decreased endurance, Decreased self-care trans, Decreased high-level ADLs     Assessment: Pt seen for OT treatment session w focus on cardiac education  Cardiac education packet reviewed with patient  Patient verbalized good understanding of education provided  Pt educated on the do's and the dont's following cardiac surgery such as no lifting over 10lbs, no driving for at least 4 weeks, no strenuous activities  Pt educated on importance of daily activities and mobilization  Pt educated on simple Energy Conservation Techniques, self pacing skills  Pt pleasant and cooperative and verbalized good understanding of education provided  OT will continue to follow       OT Discharge Recommendation: Home with home health rehabilitation

## 2023-03-09 NOTE — DISCHARGE INSTRUCTIONS
Weight yourself daily  If you gain more than 3lbs in 24 hours or 5lbs in 5 days then call cardiologist to make appointment  Please have CBC and BMP (blood work) done in 1 week  Scripts ordered

## 2023-03-09 NOTE — PROGRESS NOTES
03/09/23    Procedure: Chest tube removal    Chest tubes removed in routine fashion without incident  Insertion site dressed with Acticoat  Celester Gray tolerated the procedure well  Nurse notified      SIGNATURE: Angelo Travis  DATE: March 9, 2023  TIME: 10:33 AM

## 2023-03-09 NOTE — OCCUPATIONAL THERAPY NOTE
Occupational Therapy Treatment Note      Althea Ophelia    3/9/2023    Principal Problem:    Critical aortic valve stenosis  Active Problems:    Impaired fasting glucose    Obesity    Dyslipidemia    Coronary artery disease involving native coronary artery    S/P CABG x 1    S/P AVR (aortic valve replacement)    MINOR (acute kidney injury) ([address] )    Acute blood loss as cause of postoperative anemia    Thrombocytopenic ([address] )      Past Medical History:   Diagnosis Date    Asthma     Colon, diverticulosis     Nonspecific reaction to 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 active tuberculosis     CXR HAS BEEN CLEAR  Pneumonia        Past Surgical History:   Procedure Laterality Date    CARDIAC CATHETERIZATION  02/17/2023    Procedure: Cardiac catheterization;  Surgeon: Rosey Michael DO;  Location: [facility]; Service: Cardiology    CARDIAC CATHETERIZATION N/A 02/17/2023    Procedure: Cardiac Coronary Angiogram;  Surgeon: Rosey Michael DO;  Location: [facility]; Service: Cardiology    COLONOSCOPY  11/2007    COMPLETE; DONE 11/2007 WITH NO POLYPS, +DIVERTICULA   2/14    COLONOSCOPY  06/10/2022    ND RPLCMT AORTIC VALVE OPN W/STENTLESS TISSUE VALVE N/A 3/6/2023    Procedure: AVR WITH 25MM INSPIRIS VALVE/ CABG X 1, LIMA to LAD;  Surgeon: Jonah Perry DO;  Location: [facility]IN [facility];  Service: Cardiac Surgery    TONSILLECTOMY      LAST ASSESSED: 7/9/14 03/09/23 1003   OT Last Visit   OT Visit Date 03/09/23   Note Type   Note Type Treatment   Pain Assessment   Pain Assessment Tool 0-10   Pain Score No Pain   Restrictions/Precautions   Other Precautions Cardiac/sternal;Multiple lines   Subjective   Subjective "I'm a little tired today"   Cognition   Overall Cognitive Status WFL   Attention Within functional limits   Orientation Level Oriented X4   Memory Within functional limits   Following Commands Follows one step commands without difficulty   Assessment   Assessment Pt seen for OT treatment session w focus on cardiac education  Cardiac education packet reviewed with patient  Patient verbalized good understanding of education provided  Pt educated on the do's and the dont's following cardiac surgery such as no lifting over 10lbs, no driving for at least 4 weeks, no strenuous activities  Pt educated on importance of daily activities and mobilization  Pt educated on simple Energy Conservation Techniques, self pacing skills  Pt pleasant and cooperative and verbalized good understanding of education provided  OT will continue to follow  Plan   Treatment Interventions ADL retraining;Functional transfer training; Endurance training;Patient/family training; Compensatory technique education;Cardiac education; Energy conservation; Activityengagement   Goal Expiration Date 03/21/23   OT Treatment Day 1   OT Frequency 3-5x/wk   Recommendation   OT Discharge Recommendation Home with home health rehabilitation   AM-PAC Daily Activity Inpatient   Lower Body Dressing 2   Bathing 2   Toileting 3   Upper Body Dressing 3   Grooming 4   Eating 4   Daily Activity Raw Score 18   Daily Activity Standardized Score (Calc for Raw Score >=11) 38 20

## 2023-03-10 VITALS
HEIGHT: 66 IN | TEMPERATURE: 98.3 F | SYSTOLIC BLOOD PRESSURE: 105 MMHG | HEART RATE: 84 BPM | RESPIRATION RATE: 17 BRPM | OXYGEN SATURATION: 98 % | BODY MASS INDEX: 33.91 KG/M2 | WEIGHT: 210.98 LBS | DIASTOLIC BLOOD PRESSURE: 56 MMHG

## 2023-03-10 LAB
ANION GAP SERPL CALCULATED.3IONS-SCNC: 6 MMOL/L (ref 4–13)
BUN SERPL-MCNC: 22 MG/DL (ref 5–25)
CALCIUM SERPL-MCNC: 8.1 MG/DL (ref 8.3–10.1)
CHLORIDE SERPL-SCNC: 105 MMOL/L (ref 96–108)
CO2 SERPL-SCNC: 29 MMOL/L (ref 21–32)
CREAT SERPL-MCNC: 1.34 MG/DL (ref 0.6–1.3)
ERYTHROCYTE [DISTWIDTH] IN BLOOD BY AUTOMATED COUNT: 13.5 % (ref 11.6–15.1)
GFR SERPL CREATININE-BSD FRML MDRD: 53 ML/MIN/1.73SQ M
GLUCOSE SERPL-MCNC: 116 MG/DL (ref 65–140)
GLUCOSE SERPL-MCNC: 140 MG/DL (ref 65–140)
GLUCOSE SERPL-MCNC: 147 MG/DL (ref 65–140)
GLUCOSE SERPL-MCNC: 154 MG/DL (ref 65–140)
HCT VFR BLD AUTO: 21.5 % (ref 36.5–49.3)
HGB BLD-MCNC: 7.1 G/DL (ref 12–17)
MCH RBC QN AUTO: 31.4 PG (ref 26.8–34.3)
MCHC RBC AUTO-ENTMCNC: 32.1 G/DL (ref 31.4–37.4)
MCV RBC AUTO: 98 FL (ref 82–98)
PLATELET # BLD AUTO: 107 THOUSANDS/UL (ref 149–390)
PMV BLD AUTO: 11.4 FL (ref 8.9–12.7)
POTASSIUM SERPL-SCNC: 3.5 MMOL/L (ref 3.5–5.3)
RBC # BLD AUTO: 2.2 MILLION/UL (ref 3.88–5.62)
SODIUM SERPL-SCNC: 140 MMOL/L (ref 135–147)
WBC # BLD AUTO: 5.14 THOUSAND/UL (ref 4.31–10.16)

## 2023-03-10 RX ORDER — OXYCODONE HYDROCHLORIDE 5 MG/1
5 TABLET ORAL EVERY 6 HOURS PRN
Qty: 20 TABLET | Refills: 0 | Status: SHIPPED | OUTPATIENT
Start: 2023-03-10 | End: 2023-03-20

## 2023-03-10 RX ORDER — ASPIRIN 325 MG
325 TABLET ORAL DAILY
Qty: 30 TABLET | Refills: 3 | Status: SHIPPED | OUTPATIENT
Start: 2023-03-11

## 2023-03-10 RX ORDER — POLYETHYLENE GLYCOL 3350 17 G/17G
17 POWDER, FOR SOLUTION ORAL DAILY
Qty: 500 G | Refills: 0 | Status: SHIPPED | OUTPATIENT
Start: 2023-03-10 | End: 2023-04-09

## 2023-03-10 RX ORDER — POTASSIUM CHLORIDE 20 MEQ/1
40 TABLET, EXTENDED RELEASE ORAL ONCE
Status: COMPLETED | OUTPATIENT
Start: 2023-03-10 | End: 2023-03-10

## 2023-03-10 RX ORDER — FERROUS SULFATE 325(65) MG
325 TABLET ORAL
Qty: 90 TABLET | Refills: 0 | Status: SHIPPED | OUTPATIENT
Start: 2023-03-11

## 2023-03-10 RX ORDER — TORSEMIDE 20 MG/1
20 TABLET ORAL DAILY
Qty: 7 TABLET | Refills: 0 | Status: SHIPPED | OUTPATIENT
Start: 2023-03-10 | End: 2023-03-22

## 2023-03-10 RX ORDER — POTASSIUM CHLORIDE 20 MEQ/1
20 TABLET, EXTENDED RELEASE ORAL DAILY
Qty: 7 TABLET | Refills: 0 | Status: SHIPPED | OUTPATIENT
Start: 2023-03-10 | End: 2023-03-22

## 2023-03-10 RX ORDER — ACETAMINOPHEN 325 MG/1
650 TABLET ORAL EVERY 6 HOURS PRN
Refills: 0
Start: 2023-03-10

## 2023-03-10 RX ADMIN — FOLIC ACID 1 MG: 1 TABLET ORAL at 08:30

## 2023-03-10 RX ADMIN — ASPIRIN 325 MG ORAL TABLET 325 MG: 325 PILL ORAL at 08:30

## 2023-03-10 RX ADMIN — ACETAMINOPHEN 975 MG: 325 TABLET ORAL at 06:05

## 2023-03-10 RX ADMIN — POTASSIUM CHLORIDE 20 MEQ: 1500 TABLET, EXTENDED RELEASE ORAL at 08:30

## 2023-03-10 RX ADMIN — CHLORHEXIDINE GLUCONATE 15 ML: 1.2 SOLUTION ORAL at 08:30

## 2023-03-10 RX ADMIN — FONDAPARINUX SODIUM 2.5 MG: 2.5 INJECTION, SOLUTION SUBCUTANEOUS at 08:34

## 2023-03-10 RX ADMIN — AMIODARONE HYDROCHLORIDE 200 MG: 200 TABLET ORAL at 13:38

## 2023-03-10 RX ADMIN — AMIODARONE HYDROCHLORIDE 200 MG: 200 TABLET ORAL at 06:05

## 2023-03-10 RX ADMIN — FERROUS SULFATE TAB 325 MG (65 MG ELEMENTAL FE) 325 MG: 325 (65 FE) TAB at 08:30

## 2023-03-10 RX ADMIN — DOCUSATE SODIUM 100 MG: 100 CAPSULE, LIQUID FILLED ORAL at 08:30

## 2023-03-10 RX ADMIN — OXYCODONE HYDROCHLORIDE AND ACETAMINOPHEN 1000 MG: 500 TABLET ORAL at 08:30

## 2023-03-10 RX ADMIN — PANTOPRAZOLE SODIUM 40 MG: 40 TABLET, DELAYED RELEASE ORAL at 06:05

## 2023-03-10 RX ADMIN — POLYETHYLENE GLYCOL 3350 17 G: 17 POWDER, FOR SOLUTION ORAL at 08:32

## 2023-03-10 RX ADMIN — POTASSIUM CHLORIDE 40 MEQ: 1500 TABLET, EXTENDED RELEASE ORAL at 13:38

## 2023-03-10 RX ADMIN — MUPIROCIN 1 APPLICATION.: 20 OINTMENT TOPICAL at 08:30

## 2023-03-10 NOTE — PROGRESS NOTES
Progress Note - Cardiothoracic Surgery   Codi Cano 81 y o  male MRN: [id number]  Unit/Bed#: [facility] 408-01 Encounter: [id number]    Aortic stenosis, Non-Rheumatic, Coronary artery disease  S/P aortic valve replacement and coronary artery bypass grafting LIMA to LAD; POD # 4      24 Hour Events: Doing well no complaints  Pain controlled  Medications:   Scheduled Meds:  Current Facility-Administered Medications   Medication Dose Route Frequency Provider Last Rate   • acetaminophen  975 mg Oral Q8H SINA VitalNP     • amiodarone  200 mg Oral [facility] Jaclyn Gomez CRNP     • ascorbic acid  1,000 mg Oral Daily Jaclyn Gomez CRNP     • aspirin  325 mg Oral Daily SINA VitalNP     • atorvastatin  80 mg Oral Daily [facility]     • bisacodyl  10 mg Rectal Daily PRN MEI Vital     • chlorhexidine  15 mL Swish & Spit Q12H [facility] HOME SINA VitalNP     • docusate sodium  100 mg Oral BID MEI Vital     • ferrous sulfate  325 mg Oral Daily With Breakfast MEI Vital     • folic acid  1 mg Oral Daily SINA VitalNP     • fondaparinux  2 5 mg Subcutaneous Daily SINA VitalNP     • insulin lispro  1-5 Units Subcutaneous HS MEI Vital     • insulin lispro  1-6 Units Subcutaneous TID AC MEI Tena     • mupirocin  1 application   Nasal Q12H [facility] HOME SINA VitalNP     • ondansetron  4 mg Intravenous Q6H PRN MEI Vital     • oxyCODONE  2 5 mg Oral Q4H PRN MEI Vital     • oxyCODONE  5 mg Oral Q4H PRN SINA VitalNP     • pantoprazole  40 mg Oral Early Morning MEI Vital     • polyethylene glycol  17 g Oral Daily MEI Vital     • potassium chloride  20 mEq Oral BID Yenny Solano PA-C     • torsemide  20 mg Oral BID Yenny Soalno PA-C     • trimethobenzamide  200 mg Intramuscular Q6H PRN MEI Moran       Continuous Infusions:   PRN Meds: •  bisacodyl  •  ondansetron  •  oxyCODONE  •  oxyCODONE  •  trimethobenzamide    Vitals:   Vitals:    03/09/23 1914 03/09/23 2100 03/09/23 2329 03/10/23 0634   BP: 112/58  103/58    BP Location: Left arm  Right arm    Pulse: 98  78    Resp:       Temp: 100 °F (37 8 °C)  98 5 °F (36 9 °C)    TempSrc: Oral  Oral    SpO2: 97% 97% 94%    Weight:    95 7 kg (210 lb 15 7 oz)   Height:           Telemetry: NSR; Heart Rate: 87    Respiratory:   SpO2: SpO2: 94 %; RA    Intake/Output:     Intake/Output Summary (Last 24 hours) at 3/10/2023 0739  Last data filed at 3/10/2023 0601  Gross per 24 hour   Intake 816 ml   Output 1175 ml   Net -359 ml    UO 1 1L/24h + 1 unmeasured occurrence  -241cc    Chest tube Output:  Removed     Weights:   Weight (last 2 days)     Date/Time Weight    03/10/23 0634 95 7 (210 98)     Weight: rechecked 3 times at 03/10/23 0634    03/09/23 0600 80 6 (177 69)    03/08/23 0600 84 3 (185 85)            Results:   Results from last 7 days   Lab Units 03/10/23  0425 03/09/23  0437 03/08/23  0524   WBC Thousand/uL 5 14 8 97 13 28*   HEMOGLOBIN g/dL 7 1* 7 6* 7 9*   HEMATOCRIT % 21 5* 23 1* 23 8*   PLATELETS Thousands/uL 107* 79* 84*     Results from last 7 days   Lab Units 03/10/23  0425 03/09/23  0437 03/08/23  0524 03/06/23  1602 03/06/23  1152   SODIUM mmol/L 140 139 138   < >  --    POTASSIUM mmol/L 3 5 3 8 4 0   < >  --    CHLORIDE mmol/L 105 108 110*   < >  --    CO2 mmol/L 29 26 23   < >  --    CO2, I-STAT mmol/L  --   --   --   --  25   BUN mg/dL 22 24 25   < >  --    CREATININE mg/dL 1 34* 1 40* 1 57*   < >  --    GLUCOSE, ISTAT mg/dl  --   --   --   --  142*   CALCIUM mg/dL 8 1* 8 3 8 6   < >  --     < > = values in this interval not displayed       Results from last 7 days   Lab Units 03/06/23 2008 03/06/23  1148   INR  1 36* 1 38*   PTT seconds 29 32     Point of care glucose: 126 - 158    Studies:  None today      Invasive Lines/Tubes:  Invasive Devices     Central Venous Catheter Line  Duration           CVC Central Lines 03/06/23 Triple 3 days                Physical Exam:    HEENT/NECK:  Normocephalic  Atraumatic   no jugular venous distention  Cardiac: Regular rate and rhythm and No murmurs/rubs/gallops  Pulmonary:  Breath sounds clear bilaterally and No rales/rhonchi/wheezes  Abdomen:  Non-tender, Non-distended and Normal bowel sounds  Incisions: Sternum is stable  Incision is clean, dry, and intact  Extremities: Extremities warm/dry and No edema B/L  Neuro: Alert and oriented X 3, Sensation is grossly intact and No focal deficits  Skin: Warm/Dry, without rashes or lesions  Assessment:  Principal Problem:    Critical aortic valve stenosis  Active Problems:    Impaired fasting glucose    Obesity    Dyslipidemia    Coronary artery disease involving native coronary artery    S/P CABG x 1    S/P AVR (aortic valve replacement)    MINOR (acute kidney injury) ([facility] [address] )    Acute blood loss as cause of postoperative anemia    Thrombocytopenic (HCC)       Aortic stenosis, Non-Rheumatic, Coronary artery disease  S/P aortic valve replacement and coronary artery bypass grafting LIMA to LAD; POD # 4    Plan:    1  Cardiac:   NSR; HR/BP stable  Continue lopressor 12 5mg BID  Continue ASA and Statin therapy  Epicardial pacing wires out  D/C TLC  Continue DVT prophylaxis    2  Pulmonary:   Good Room air oxygen saturation; Continue incentive spirometry/Coughing/Deep breathing exercises  Chest tubes have been discontinued    3  Renal:   Intake/Output net: -241 cL/24 hours, UO 1 1L/24hr + 1 unmeasured occurence  Diuretic Regimen:  Cont Torsemide 20mg PO BID  Post op Creatinine stable; Follow up labs prn    4  Neuro:  Neurologically intact; No active issues  Incisional pain well-controlled  Continue Tylenol, 975 mg PO q 8, standing dose  Continue Oxycodone, 2 5 to 5 mg PO q 4 hours prn pain    5   GI:  Tolerating TLC 2 3 gm sodium diet  Maintain 1800 mL daily fluid restriction   Continue stool softeners and prn suppository  Continue GI prophylaxis    6  Endo:   Continue sliding scale coverage, Blood sugars better controlled     7    Hematology:    Post-operative acute blood loss anemia; Hemoglobin 7 1 from 7 6; trend prn and Thrombocytopenia    Thrombocytopenia - Plt 101 (74), monitor    8     Disposition:      Follow daily PT/OT recommendations regarding home vs  rehab when medically cleared for discharge - d/c home with home PT, likely today    VTE Pharmacologic Prophylaxis: Fondaparinux (Arixtra)  VTE Mechanical Prophylaxis: sequential compression device    Collaborative rounds completed with supervising physician  Plan of care discussed with bedside nurse    SIGNATURE: Che Castillo PA-C  DATE: March 10, 2023  TIME: 7:39 AM

## 2023-03-10 NOTE — DISCHARGE SUMMARY
Discharge Summary - Cardiothoracic Surgery   Theresa Garcia 81 y o  male MRN: [id number]  Unit/Bed#: [facility] 408-01 Encounter: [id number]    Admission Date: 3/6/2023     Discharge Date: 03/10/23    Admitting Diagnosis: Critical aortic valve stenosis [I35 0]  Coronary artery disease involving native coronary artery of native heart without angina pectoris [I25 10]    Primary Discharge Diagnosis:   Aortic stenosis, Non-Rheumatic, Coronary artery disease  S/P aortic valve replacement and coronary artery bypass grafting;    Secondary Discharge Diagnosis:    Critical AS, CAD, HLD, diverticulosis, elevated PSA, impaired fasting glucose, ABLA, Thrombocytopenia, MINOR, resolving     Attending: VERENA Murray  Consulting Physician(s):   Cardiology  Medical/Critical Care    Procedures Performed:   Procedure(s):  AVR WITH 25MM INSPIRIS VALVE/ CABG X 1, LIMA to LAD     Hospital Course: The patient was seen in consultation prior to this admission for evaluation of Aortic stenosis, Non-Rheumatic, Coronary artery disease  Risks and benefits of aortic valve replacement and coronary artery bypass grafting were discussed in detail, and patient was agreeable  Routine preoperative evaluation was completed and informed consent was obtained prior to admission  3/6: Elective admission for AVR # 25mm Inspiris, CABG x 1 (LIMA-LAD)  Intraoperative blood products include: 1-unit Plt  Transferred to ICU supported with no gtts  Amicar and coags ordered  Wean towards extubation  Intraop UA negative; SB, rate 52  PM: Given 1L LR post-op  Started on cori for hypotension, now at 25  Remains A paced w/ SB underneath  On vent wean  3/7: CT dump QHS, coags WNL at the time, intermittent adjustment to pressors, stable CT o/p by rounds this AM, Hb 8 7 from 11 4, start iron/vitamin C supplementation  Given additional 1 5L albumin post-op for low filling pressures/hypotension  On cori 110, hold beta blocker, wean cori as able, maintain a line  Delined  Extubated to [facility], wean as tolerated  SVO2 61  Start Lasix 40mg IV BID, discontinue corral catheter  Discontinue insulin gtt, transition to [facility]  Transfer to stepdown  3/8: Remains on Leroy 40  Wean as tolerated  Hold BB  D/C PW  CTs with high output, keep today  Wean O2  Continue lasix 40 iv BID, Cr 1 57 (1 1)  SSI  Ambulate  3/9: VSS  NSR  Off Leroy  Start low dose BB  Remains on 1L NC, wean off  D/c CTs today  Change to TOrsemide 20mg BID, Cr 1 4 (1 57)  SSI  Ambulating  Tx to tele      3/10: VSS  Sats good on RA  Cont current meds  Cont torsemide 20mg BID, CR 1 3 from 1,4  HGB 7 1 from 7 6  Ambulating  Home today  Condition at Discharge:   fair     Discharge Physical Exam:    Please see the documented physical exam from this morning's progress note for details  Discharge Data:  Results from last 7 days   Lab Units 03/10/23  0425 03/09/23  0437 03/08/23  0524   WBC Thousand/uL 5 14 8 97 13 28*   HEMOGLOBIN g/dL 7 1* 7 6* 7 9*   HEMATOCRIT % 21 5* 23 1* 23 8*   PLATELETS Thousands/uL 107* 79* 84*     Results from last 7 days   Lab Units 03/10/23  0425 03/09/23  0437 03/08/23  0524 03/06/23  1602 03/06/23  1152   POTASSIUM mmol/L 3 5 3 8 4 0   < >  --    CHLORIDE mmol/L 105 108 110*   < >  --    CO2 mmol/L 29 26 23   < >  --    CO2, I-STAT mmol/L  --   --   --   --  25   BUN mg/dL 22 24 25   < >  --    CREATININE mg/dL 1 34* 1 40* 1 57*   < >  --    GLUCOSE, ISTAT mg/dl  --   --   --   --  142*   CALCIUM mg/dL 8 1* 8 3 8 6   < >  --     < > = values in this interval not displayed  Results from last 7 days   Lab Units 03/06/23 2008 03/06/23  1148   INR  1 36* 1 38*   PTT seconds 29 32       Discharge instructions/Information to patient and family:   See after visit summary for information provided to patient and family  Bharti Carpenter was educated on restrictions regarding driving and lifting, and techniques of proper incisional care    They were specifically counselled on signs and symptoms of an incisional infection, and advised to contact our service immediately should they develop fevers, sweats, chill, redness or drainage at the site of any incisions  Provisions for Follow-Up Care:  See after visit summary for information related to follow-up care and any pertinent home health orders  Disposition:  Home    Planned Readmission:   No    Discharge Medications:  See after visit summary for reconciled discharge medications provided to patient and family  Darío Maldonado was provided contact information and scheduled a follow up appointment with VERENA Amaya  Additionally, follow up appointments have been scheduled for their primary care physician and primary cardiologist   Contact information was provided  Darío Maldonado was counseled on the importance of avoiding tobacco products  As with all patients whom have undergone open heart surgery, tobacco cessation medication was contraindicated at the time of discharge  ACE/ARB was Contraindicated secondary to renal dysfunction    Beta Blocker was Prescribed at discharge    Aspirin was Prescribed at discharge    Statin was Prescribed at discharge      The patient was discharged on ongoing diuretic therapy with Torsemide 20 mg, PO QD and Potassium Chloride 20 mEq, PO QD  They were advised to continue these medications for 7 days, unless otherwise directed  Narcotic pain medication was prescribed in the form of Oxycodone 5mg PRN  Prior to prescribing, their prescription profile was reviewed on the [facility] of health prescription drug monitoring program     The patient was informed that following their postoperative surgical evaluation, they will be referred to outpatient cardiac rehabilitation  They were counseled that this program is run by specialists who will help them safely strengthen their heart and prevent more heart disease    Cardiac rehabilitation will include exercise, relaxation, stress management, and heart-healthy nutrition  Caregivers will also check to make sure their medication regimen is working  During this admission, the patient was questioned on their use of tobacco, alcohol, and illicit/non-prescription drug use in the  previous 24 months  Nirmala Smith states that they have not used any of these substances in this time frame  I spent 30 minutes discharging the patient  This time was spent on the day of discharge  I had direct contact with the patient on the day of discharge  Additional documentation is required if more than 30 minutes were spent on discharge       SIGNATURE: Angelo Aleman  DATE: March 10, 2023  TIME: 10:40 AM

## 2023-03-10 NOTE — CASE MANAGEMENT
Case Management Discharge Planning Note    Patient name Richar Crane  Location [address] 408/PPHP 551-12 MRN [id number]  : 1950 Date 3/10/2023       Current Admission Date: 3/6/2023  Current Admission Diagnosis:Critical aortic valve stenosis   Patient Active Problem List    Diagnosis Date Noted   • MINOR (acute kidney injury) ([facility] [address] ) 2023   • Acute blood loss as cause of postoperative anemia 2023   • Thrombocytopenic ([facility] [address] ) 2023   • S/P CABG x 1 2023   • S/P AVR (aortic valve replacement) 2023   • Coronary artery disease involving native coronary artery 2023   • Critical aortic valve stenosis 2023   • Elevated PSA measurement 2018   • Dyslipidemia 2018   • Obesity 2015   • ED (erectile dysfunction) 2014   • Impaired fasting glucose 2012      LOS (days): 4  Geometric Mean LOS (GMLOS) (days):   Days to GMLOS:     OBJECTIVE:  Risk of Unplanned Readmission Score: 14 47         Current admission status: Inpatient   Preferred Pharmacy:   [facility] [address] [facility], [address] [place] [address]  [facility] [address]  Phone: [phone] Fax: [phone] [place] 232 [facility] [address] [facility] 94 [facility] 38 210 [facility]  Phone: [phone] Fax: [phone]    Primary Care Provider: Amrik Griffin MD    Primary Insurance: [facility]  Secondary Insurance:     DISCHARGE DETAILS:                                [facility] Agency Name[de-identified] [address] [place] [address] Provider[de-identified] PCP

## 2023-03-12 ENCOUNTER — HOME CARE VISIT (OUTPATIENT)
Dept: HOME HEALTH SERVICES | Facility: HOME HEALTHCARE | Age: 73
End: 2023-03-12

## 2023-03-13 NOTE — UTILIZATION REVIEW
NOTIFICATION OF ADMISSION DISCHARGE   This is a Notification of Discharge from [address]  Please be advised that this patient has been discharge from our facility  Below you will find the admission and discharge date and time including the patient’s disposition  UTILIZATION REVIEW CONTACT:  Roybn Loomis  Utilization   Network Utilization Review Department  Phone: [phone] x carefully listen to the prompts  All voicemails are confidential   Email: [email or web address]     ADMISSION INFORMATION  PRESENTATION DATE: 3/6/2023  5:09 AM  OBERVATION ADMISSION DATE:   INPATIENT ADMISSION DATE: 3/6/23  7:11 AM   DISCHARGE DATE: 3/10/2023  2:39 PM   DISPOSITION:Home with Home Health Care    IMPORTANT INFORMATION:  Send all requests for admission clinical reviews, approved or denied determinations and any other requests to dedicated fax number below belonging to the campus where the patient is receiving treatment   List of dedicated fax numbers:  [address] DENIALS (Administrative/Medical Necessity) [phone]   [address] (Maternity/NICU/Pediatrics) [phone]   [facility] [phone]   [facility] [phone]   [facility] [phone]   220 [facility] [phone]   90 [facility] [phone]   [address] [place] [address] [phone]   [facility]  [phone] 4058 [facility] [phone]   412 [facility] 850 [facility] [phone]

## 2023-03-14 ENCOUNTER — TELEMEDICINE (OUTPATIENT)
Dept: FAMILY MEDICINE CLINIC | Facility: CLINIC | Age: 73
End: 2023-03-14

## 2023-03-14 ENCOUNTER — HOME CARE VISIT (OUTPATIENT)
Dept: HOME HEALTH SERVICES | Facility: HOME HEALTHCARE | Age: 73
End: 2023-03-14

## 2023-03-14 VITALS
OXYGEN SATURATION: 95 % | DIASTOLIC BLOOD PRESSURE: 62 MMHG | SYSTOLIC BLOOD PRESSURE: 112 MMHG | RESPIRATION RATE: 16 BRPM | HEART RATE: 88 BPM | TEMPERATURE: 96.9 F

## 2023-03-14 VITALS
HEART RATE: 88 BPM | DIASTOLIC BLOOD PRESSURE: 62 MMHG | SYSTOLIC BLOOD PRESSURE: 112 MMHG | WEIGHT: 210 LBS | BODY MASS INDEX: 33.75 KG/M2 | TEMPERATURE: 96.9 F | OXYGEN SATURATION: 95 % | RESPIRATION RATE: 16 BRPM | HEIGHT: 66 IN

## 2023-03-14 DIAGNOSIS — E78.5 DYSLIPIDEMIA: ICD-10-CM

## 2023-03-14 DIAGNOSIS — Z95.2 S/P AVR (AORTIC VALVE REPLACEMENT): Primary | ICD-10-CM

## 2023-03-14 DIAGNOSIS — E66.09 CLASS 2 OBESITY DUE TO EXCESS CALORIES WITHOUT SERIOUS COMORBIDITY WITH BODY MASS INDEX (BMI) OF 35.0 TO 35.9 IN ADULT: ICD-10-CM

## 2023-03-14 DIAGNOSIS — N17.9 AKI (ACUTE KIDNEY INJURY) (HCC): ICD-10-CM

## 2023-03-14 DIAGNOSIS — Z95.1 S/P CABG X 1: ICD-10-CM

## 2023-03-14 DIAGNOSIS — D62 ACUTE BLOOD LOSS AS CAUSE OF POSTOPERATIVE ANEMIA: ICD-10-CM

## 2023-03-14 DIAGNOSIS — I25.10 CORONARY ARTERY DISEASE INVOLVING NATIVE CORONARY ARTERY OF NATIVE HEART WITHOUT ANGINA PECTORIS: ICD-10-CM

## 2023-03-14 DIAGNOSIS — D69.6 THROMBOCYTOPENIC (HCC): ICD-10-CM

## 2023-03-14 NOTE — PROGRESS NOTES
Chief Complaint   Patient presents with   • Virtual Tcm   • Virtual Tcm     Health Maintenance   Topic Date Due   • COVID-19 Vaccine (3 - Booster) 05/20/2021   • Influenza Vaccine (1) Never done   • Fall Risk  11/14/2023   • Depression Screening  11/14/2023   • Medicare Annual Wellness Visit (AWV)  11/14/2023   • BMI: Followup Plan  11/14/2023   • BMI: Adult  03/14/2024   • Colorectal Cancer Screening  06/30/2032   • Hepatitis C Screening  Completed   • Pneumococcal Vaccine: 65+ Years  Completed   • HIB Vaccine  Aged Out   • IPV Vaccine  Aged Out   • Hepatitis A Vaccine  Aged Out   • Meningococcal ACWY Vaccine  Aged Out   • HPV Vaccine  Aged Out       Virtual TCM Visit:    Verification of patient location:    Patient is located in the following state in which I hold an active license [place]    Assessment/Plan:        Problem List Items Addressed This Visit        Cardiovascular and Mediastinum    Coronary artery disease involving native coronary artery     S/P CABG x 1  Continue Aspirin 325 mg daily, Metoprolol ER 25 mg daily, Rosuvastatin 20 mg daily  Follow-up with cardiology and cardiothoracic surgeon  Hematopoietic and Hemostatic    Thrombocytopenic ([facility] )     Blood test done on March 10, 2023 showed platelet count at 412,441  Recheck CBC with dif  as ordered  Patient states that blood work will be done at home by visiting nurse on 3/17/23  Genitourinary    MINOR (acute kidney injury) ([facility] )     Blood work done on March 10, 2023 showed creatinine 1 34, GFR 53  Encouraged to stay well-hydrated  Avoid NSAIDs  Check BMP on 3/17/23  Other    Obesity     Continue working on dietary and lifestyle modifications  Dyslipidemia     Continue Rosuvastatin 20 mg daily  Follow-up with cardiology             S/P CABG x 1    S/P AVR (aortic valve replacement) - Primary     Patient was admitted to [facility] 3/6/23 -3/10/23 for critical aortic valve stenosis, underwent aortic valve replacement  Doing well post-op  Denies chest pain, shortness of breath, dizziness  Vitals are stable  Continue current medical regimen  Follow-up with cardiology CRNP on 3/22/23 and cardiothoracic surgeon Dr Earl Crandall on 4/5/23  Acute blood loss as cause of postoperative anemia     Continue Ferrous sulfate with Vit C daily  Patient has order to recheck CBC with dif  on 3/1/7/23  Patient declined to schedule follow-up visit in 3-4 months  He would like to keep his follow-up visit as scheduled in 11/2023  Recommended to call office with any acute problems  Reason for visit is TCM    Encounter provider Amrik Griffin MD     Provider located at [address] [place] [id number]    Recent Visits  No visits were found meeting these conditions  Showing recent visits within past 7 days and meeting all other requirements  Today's Visits  Date Type Provider Dept   03/14/23 Telemedicine Amrik Griffin MD [facility] [id number] today's visits and meeting all other requirements  Future Appointments  No visits were found meeting these conditions  Showing future appointments within next 150 days and meeting all other requirements       After connecting through [employer], the patient was identified by name and date of birth  Richar Crane was informed that this is a telemedicine visit and that the visit is being conducted through the [employer]  He agrees to proceed     My office door was closed  No one else was in the room  He acknowledged consent and understanding of privacy and security of the video platform  The patient has agreed to participate and understands they can discontinue the visit at any time  Patient is aware this is a billable service  Transitional Care Management Review:  Richar Crane is a 72 y o  male here for TCM follow up       During the TCM phone call patient stated:    TCM Call     Date and time call was made  3/8/2023 11:21 AM    Patient was hospitialized at  [facility]    Date of Admission  03/06/23    Diagnosis  Critical aortic valve stenosis    Disposition  Home    Were the patients medications reviewed and updated  Yes    Current Symptoms  None      TCM Call     Post hospital issues  None    Should patient be enrolled in anticoag monitoring? No    Scheduled for follow up? Yes    Did you obtain your prescribed medications  Yes    Do you need help managing your prescriptions or medications  No    Is transportation to your appointment needed  No    I have advised the patient to call PCP with any new or worsening symptoms  [address] or [employer] other    Support System  Spouse    Do you have social support  Yes, some    Are you recieving any outpatient services  No    Are you recieving home care services  Yes    Types of home care services  Other (comment)    Comment  To be evaluated    Are you using any community resources  No    Current waiver services  No    Have you fallen in the last 12 months  No    Interperter language line needed  No    Counseling  Patient        Subjective:     Patient ID: Nathan Rivera is a 72 y o  male  HPI     Patient presents for virtual video TCM visit  Patient was admitted to [facility] 3/6/23 -3/10/23 for critical aortic valve stenosis, CAD, underwent aortic valve replacement and CABG x 1  Surgery was performed by cardiothoracic surgeon Dr Kalli Pizano  No post-op complications  Patient was discharged home with Home Health services  He was seen this morning by home health nurse  Vitals are stable  Patient denies chest pain, shortness of breath, dizziness  Reports no significant leg edema  Reviewed hospital records, discharge medications, blood test results from March 10, 2023        Creatinine 1 34, GFR 53, potassium 3 5, glucose 154, Hb 7 1, platelet count 609,931  Patient denies any significant problems with bruising or bleeding  He has appointment scheduled with cardiology [facility] on March 22, 2023 and cardiothoracic surgeon Dr Jody Brown on April 5, 2023  Review of Systems   Constitutional: Positive for fatigue (mild)  Negative for activity change, appetite change, chills and fever  HENT: Negative for congestion, ear pain, sore throat and trouble swallowing  Eyes: Negative  Respiratory: Negative for cough, chest tightness, shortness of breath and wheezing  Cardiovascular: Negative for chest pain, palpitations and leg swelling  Gastrointestinal: Negative for abdominal pain, blood in stool, constipation, diarrhea, nausea and vomiting  Genitourinary: Negative for difficulty urinating, dysuria and hematuria  Musculoskeletal: Positive for gait problem (ambulates with a walker)  Negative for arthralgias, back pain and joint swelling  Skin: Negative for rash  Neurological: Negative for dizziness, syncope and headaches  Hematological: Negative  Psychiatric/Behavioral: Negative for dysphoric mood and sleep disturbance  The patient is not nervous/anxious  Objective:    Vitals:    03/14/23 1115   BP: 112/62   BP Location: Left arm   Patient Position: Sitting   Pulse: 88   Resp: 16   Temp: (!) 96 9 °F (36 1 °C)   TempSrc: Tympanic   SpO2: 95%   Weight: 95 3 kg (210 lb)   Height: 5' 6" (1 676 m)       Physical Exam  Vitals and nursing note reviewed  Constitutional:       Appearance: Normal appearance  He is obese  Eyes:      Conjunctiva/sclera: Conjunctivae normal       Pupils: Pupils are equal, round, and reactive to light  Cardiovascular:      Comments: Denies chest pain  Pulmonary:      Effort: Pulmonary effort is normal    Musculoskeletal:         General: Normal range of motion  Cervical back: Normal range of motion and neck supple  Skin:     Findings: No rash     Neurological:      Mental Status: He is alert  Psychiatric:         Mood and Affect: Mood normal          Medications have been reviewed by provider in current encounter    I spent 25 minutes with the patient during this visit  Koki Ovalle MD      VIRTUAL VISIT [id number] [address]  Aditya Joya verbally agrees to participate in [facility]  Pt is aware that [facility] could be limited without vital signs or the ability to perform a full hands-on physical Naomi Candace understands he or the provider may request at any time to terminate the video visit and request the patient to seek care or treatment in person

## 2023-03-14 NOTE — ASSESSMENT & PLAN NOTE
Patient was admitted to [facility] 3/6/23 -3/10/23 for critical aortic valve stenosis, underwent aortic valve replacement  Doing well post-op  Denies chest pain, shortness of breath, dizziness  Vitals are stable  Continue current medical regimen  Follow-up with cardiology CRNP on 3/22/23 and cardiothoracic surgeon Dr Amirah Trinh on 4/5/23

## 2023-03-14 NOTE — ASSESSMENT & PLAN NOTE
Blood test done on March 10, 2023 showed platelet count at 389,999  Recheck CBC with dif  as ordered  Patient states that blood work will be done at home by visiting nurse on 3/17/23

## 2023-03-14 NOTE — CASE COMMUNICATION
[facility] has admitted your patient to [address] [place] [address] service with the following disciplines:      SN and PT  This report is informational only, no responses is needed  Primary focus of home health care: Cardiac  Patient stated goals of care: Anticipated visit pattern: 2w3 and next visit date: 3/17/23 fasting Labs SP CABG and AVR  See medication list - meds in home differ from AVS: No longer using desoximetasone  Significant clinic al findings: Plus one nonpitting edema BLE ankle  Thank you for allowing us to participate in the care of your patient        National Doyle Alvarado RN

## 2023-03-14 NOTE — ASSESSMENT & PLAN NOTE
S/P CABG x 1  Continue Aspirin 325 mg daily, Metoprolol ER 25 mg daily, Rosuvastatin 20 mg daily  Follow-up with cardiology and cardiothoracic surgeon

## 2023-03-14 NOTE — ASSESSMENT & PLAN NOTE
Blood work done on March 10, 2023 showed creatinine 1 34, GFR 53  Encouraged to stay well-hydrated  Avoid NSAIDs  Check BMP on 3/17/23

## 2023-03-15 ENCOUNTER — TELEPHONE (OUTPATIENT)
Dept: CARDIAC SURGERY | Facility: CLINIC | Age: 73
End: 2023-03-15

## 2023-03-15 ENCOUNTER — HOME CARE VISIT (OUTPATIENT)
Dept: HOME HEALTH SERVICES | Facility: HOME HEALTHCARE | Age: 73
End: 2023-03-15

## 2023-03-15 VITALS
RESPIRATION RATE: 24 BRPM | SYSTOLIC BLOOD PRESSURE: 120 MMHG | HEART RATE: 94 BPM | DIASTOLIC BLOOD PRESSURE: 72 MMHG | OXYGEN SATURATION: 98 %

## 2023-03-15 NOTE — TELEPHONE ENCOUNTER
POST OP CALL    3/6/23: AVR/CABG X 1  3/10/23: Discharge home  3/15/23: Spoke with patient  He states he feels well, just tired  Advised him that this is normal as he has APOBLA- d/c on Iron and Vit C  He denies chest pain, lightheadedness, palpitations or SOB  Weight is stable at 207 lb (pre op 207 lb) and he denies edema  He is tolerating light activity & ambulation  He was seen by [facility] yesterday for initial visit  He is scheduled to be seen by PT today  Incisions healing well  He is aware that he needs CBC and BMP drawn on 3/17/23- [facility] to draw  Explained why we are re-checking his lab work  Reviewed meds  Reviewed appts  Answered questions

## 2023-03-17 ENCOUNTER — HOME CARE VISIT (OUTPATIENT)
Dept: HOME HEALTH SERVICES | Facility: HOME HEALTHCARE | Age: 73
End: 2023-03-17

## 2023-03-17 ENCOUNTER — LAB REQUISITION (OUTPATIENT)
Dept: LAB | Facility: HOSPITAL | Age: 73
End: 2023-03-17

## 2023-03-17 ENCOUNTER — TELEPHONE (OUTPATIENT)
Dept: CARDIAC SURGERY | Facility: CLINIC | Age: 73
End: 2023-03-17

## 2023-03-17 VITALS
SYSTOLIC BLOOD PRESSURE: 118 MMHG | TEMPERATURE: 97.6 F | RESPIRATION RATE: 18 BRPM | HEART RATE: 83 BPM | DIASTOLIC BLOOD PRESSURE: 56 MMHG | OXYGEN SATURATION: 96 %

## 2023-03-17 DIAGNOSIS — I25.10 ATHEROSCLEROTIC HEART DISEASE OF NATIVE CORONARY ARTERY WITHOUT ANGINA PECTORIS: ICD-10-CM

## 2023-03-17 DIAGNOSIS — R73.01 IMPAIRED FASTING GLUCOSE: ICD-10-CM

## 2023-03-17 DIAGNOSIS — I35.0 NONRHEUMATIC AORTIC (VALVE) STENOSIS: ICD-10-CM

## 2023-03-17 LAB
ANION GAP SERPL CALCULATED.3IONS-SCNC: 7 MMOL/L (ref 4–13)
BUN SERPL-MCNC: 18 MG/DL (ref 5–25)
CALCIUM SERPL-MCNC: 8.7 MG/DL (ref 8.4–10.2)
CHLORIDE SERPL-SCNC: 101 MMOL/L (ref 96–108)
CO2 SERPL-SCNC: 28 MMOL/L (ref 21–32)
CREAT SERPL-MCNC: 1.41 MG/DL (ref 0.6–1.3)
ERYTHROCYTE [DISTWIDTH] IN BLOOD BY AUTOMATED COUNT: 14.1 % (ref 11.6–15.1)
GFR SERPL CREATININE-BSD FRML MDRD: 49 ML/MIN/1.73SQ M
GLUCOSE SERPL-MCNC: 127 MG/DL (ref 65–140)
HCT VFR BLD AUTO: 29.6 % (ref 36.5–49.3)
HGB BLD-MCNC: 9.1 G/DL (ref 12–17)
MCH RBC QN AUTO: 30.8 PG (ref 26.8–34.3)
MCHC RBC AUTO-ENTMCNC: 30.7 G/DL (ref 31.4–37.4)
MCV RBC AUTO: 100 FL (ref 82–98)
PLATELET # BLD AUTO: 381 THOUSANDS/UL (ref 149–390)
PMV BLD AUTO: 11 FL (ref 8.9–12.7)
POTASSIUM SERPL-SCNC: 4.8 MMOL/L (ref 3.5–5.3)
RBC # BLD AUTO: 2.95 MILLION/UL (ref 3.88–5.62)
SODIUM SERPL-SCNC: 136 MMOL/L (ref 135–147)
WBC # BLD AUTO: 12.88 THOUSAND/UL (ref 4.31–10.16)

## 2023-03-17 NOTE — CASE COMMUNICATION
HOme PT Jeanmarie completed   Plan 2w3  1w1 for Education re energy conservation strategies  Progress of home walk program   GAit training progression  from [facility] to no devices when appropriate  Assess on steps with CV clearance  Bedroom and full bath on 2nd floor

## 2023-03-19 ENCOUNTER — HOME CARE VISIT (OUTPATIENT)
Dept: HOME HEALTH SERVICES | Facility: HOME HEALTHCARE | Age: 73
End: 2023-03-19

## 2023-03-19 VITALS
HEART RATE: 68 BPM | OXYGEN SATURATION: 96 % | DIASTOLIC BLOOD PRESSURE: 68 MMHG | SYSTOLIC BLOOD PRESSURE: 120 MMHG | RESPIRATION RATE: 24 BRPM

## 2023-03-20 ENCOUNTER — HOME CARE VISIT (OUTPATIENT)
Dept: HOME HEALTH SERVICES | Facility: HOME HEALTHCARE | Age: 73
End: 2023-03-20

## 2023-03-20 VITALS
SYSTOLIC BLOOD PRESSURE: 116 MMHG | HEART RATE: 84 BPM | TEMPERATURE: 97.9 F | RESPIRATION RATE: 18 BRPM | DIASTOLIC BLOOD PRESSURE: 68 MMHG | OXYGEN SATURATION: 99 %

## 2023-03-21 ENCOUNTER — HOME CARE VISIT (OUTPATIENT)
Dept: HOME HEALTH SERVICES | Facility: HOME HEALTHCARE | Age: 73
End: 2023-03-21

## 2023-03-21 VITALS — SYSTOLIC BLOOD PRESSURE: 138 MMHG | OXYGEN SATURATION: 97 % | HEART RATE: 87 BPM | DIASTOLIC BLOOD PRESSURE: 70 MMHG

## 2023-03-21 NOTE — HOME HEALTH
Pt reports that each day he is feeling better  Pt denies having pain this date  He has started amb indoors with no AD  Tomorrow pt has scheduled cadiologist apt

## 2023-03-21 NOTE — PROGRESS NOTES
Cardiology Follow Up    Joey Hernandez  1950  [id number]  [address] [facility] [address] [place] [id number] [phone]    1  S/P CABG x 1        2  S/P AVR (aortic valve replacement)        3  Dyslipidemia        4  Elevated serum creatinine            Interval History:   Mr Joey Hernandez was admitted to [facility] on 3/06 - 3/10/23 with critical AS  Mr Casi Billy was electively admitted and underwent AVR #25mm Inspiris, CABG x 1 LIMA to LAD by Dr Mata Corley  He was discharged home on POD#4  Mr Joey Hernandez presents to our office for a recent hospitalization follow up visit  He is accompanied by his wife who is assisting him with care  Massimo chest pain palpitations lightheadedness dizziness dyspnea with minimal exertion  He has VNA and PT coming into the home  Glenwoodalberto Dominguezalberto admits to mid sternal CT drainage slightly increased from a few days ago  Medical History   Primary Cardiologist Dr Ella Fish  CAD  Hyperlipidemia 2/25/23 , TG 45, HDL 49, LDL 52    Post op creat elevated, 1 34 - 1 41 GFR 49-53       1/09/23 TTE  LVEF 60%, grade 1 abnormal relaxation  Critical AS REKHA 0 57cm2  Patient Active Problem List   Diagnosis   • ED (erectile dysfunction)   • Impaired fasting glucose   • Obesity   • Dyslipidemia   • Elevated PSA measurement   • Critical aortic valve stenosis   • Coronary artery disease involving native coronary artery   • S/P CABG x 1   • S/P AVR (aortic valve replacement)   • MINOR (acute kidney injury) ([address] )   • Acute blood loss as cause of postoperative anemia   • Thrombocytopenic ([address] )     Past Medical History:   Diagnosis Date   • Asthma    • Colon, diverticulosis    • Nonspecific reaction to 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 active tuberculosis     CXR HAS BEEN CLEAR     • Pneumonia      Social History     Socioeconomic History   • Marital status: /Civil Union     Spouse name: Not on file   • Number of children: Not on file   • Years of education: Not on file   • Highest education level: Not on file   Occupational History   • Not on file   Tobacco Use   • Smoking status: Never   • Smokeless tobacco: Never   Substance and Sexual Activity   • Alcohol use: No   • Drug use: No   • Sexual activity: Not on file   Other Topics Concern   • Not on file   Social History Narrative   • Not on file     Social Determinants of Health     Financial Resource Strain: Low Risk    • Difficulty of Paying Living Expenses: Not hard at all   Food Insecurity: No Food Insecurity   • Worried About [address] [place] in the Last Year: Never true   • Ran Out of Food in the Last Year: Never true   Transportation Needs: No Transportation Needs   • Lack of Transportation (Medical): No   • Lack of Transportation (Non-Medical): No   Physical Activity: Not on file   Stress: Not on file   Social Connections: Not on file   Intimate Partner Violence: Not on file   Housing Stability: Low Risk    • Unable to Pay for Housing in the Last Year: No   • Number of Places Lived in the Last Year: 1   • Unstable Housing in the Last Year: No      Family History   Problem Relation Age of Onset   • Diabetes Mother         MELLITUS   • Coronary artery disease Mother    • Coronary artery disease Father    • Stroke Father         SYNDROME     Past Surgical History:   Procedure Laterality Date   • CARDIAC CATHETERIZATION  02/17/2023    Procedure: Cardiac catheterization;  Surgeon: Varsha Gaytan DO;  Location: [facility]; Service: Cardiology   • CARDIAC CATHETERIZATION N/A 02/17/2023    Procedure: Cardiac Coronary Angiogram;  Surgeon: Varsha Gaytan DO;  Location: [facility]; Service: Cardiology   • COLONOSCOPY  11/2007    COMPLETE; DONE 11/2007 WITH NO POLYPS, +DIVERTICULA   2/14   • COLONOSCOPY  06/10/2022   • LA RPLCMT AORTIC VALVE OPN W/STENTLESS TISSUE VALVE N/A 3/6/2023    Procedure: AVR WITH 25MM INSPIRIS VALVE/ CABG X 1, SALDANA to LAD;  Surgeon: Tay Madrid DO;  Location: [facility];  Service: Cardiac Surgery   • TONSILLECTOMY      LAST ASSESSED: 7/9/14       Current Outpatient Medications:   •  acetaminophen (TYLENOL) 325 mg tablet, Take 2 tablets (650 mg total) by mouth every 6 (six) hours as needed for mild pain, Disp: , Rfl: 0  •  ascorbic acid (VITAMIN C) 1000 MG tablet, Take 1 tablet (1,000 mg total) by mouth daily Do not start before March 11, 2023 , Disp: 90 tablet, Rfl: 0  •  aspirin 325 mg tablet, Take 1 tablet (325 mg total) by mouth daily Do not start before March 11, 2023 , Disp: 30 tablet, Rfl: 3  •  desoximetasone (TOPICORT) 0 25 % cream, Apply topically 2 (two) times a day prn, Disp: , Rfl:   •  ferrous sulfate 325 (65 Fe) mg tablet, Take 1 tablet (325 mg total) by mouth daily with breakfast Do not start before March 11, 2023 , Disp: 90 tablet, Rfl: 0  •  metoprolol succinate (TOPROL-XL) 25 mg 24 hr tablet, Take 1 tablet (25 mg total) by mouth daily, Disp: 30 tablet, Rfl: 6  •  polyethylene glycol (GLYCOLAX) 17 GM/SCOOP powder, Take 17 g by mouth daily, Disp: 500 g, Rfl: 0  •  potassium chloride (K-DUR,KLOR-CON) 20 mEq tablet, Take 1 tablet (20 mEq total) by mouth daily Take for 7 days then stop, Disp: 7 tablet, Rfl: 0  •  rosuvastatin (CRESTOR) 20 MG tablet, Take 1 tablet (20 mg total) by mouth daily, Disp: 30 tablet, Rfl: 6  •  torsemide (DEMADEX) 20 mg tablet, Take 1 tablet (20 mg total) by mouth daily Take for 7 days then stop, Disp: 7 tablet, Rfl: 0  Allergies   Allergen Reactions   • Fish-Derived Products - Food Allergy Hives   • Tuberculin, Ppd Rash       Labs:  Lab Requisition on 03/17/2023   Component Date Value   • Sodium 03/17/2023 136    • Potassium 03/17/2023 4 8    • Chloride 03/17/2023 101    • CO2 03/17/2023 28    • ANION GAP 03/17/2023 7    • BUN 03/17/2023 18    • Creatinine 03/17/2023 1 41 (H)    • Glucose 03/17/2023 127    • Calcium 03/17/2023 8 7    • eGFR 03/17/2023 49    • WBC 03/17/2023 12 88 (H)    • RBC 03/17/2023 2 95 (L)    • Hemoglobin 03/17/2023 9 1 (L)    • Hematocrit 03/17/2023 29 6 (L)    • MCV 03/17/2023 100 (H)    • MCH 03/17/2023 30 8    • MCHC 03/17/2023 30 7 (L)    • RDW 03/17/2023 14 1    • Platelets 29/63/4959 381    • MPV 03/17/2023 11 0    No results displayed because visit has over 200 results        Appointment on 02/25/2023   Component Date Value   • Hemoglobin A1C 02/25/2023 5 4    • EAG 02/25/2023 108    • Protime 02/25/2023 13 7    • INR 02/25/2023 1 03    • MRSA Culture Only 02/25/2023 No Methicillin Resistant Staphlyococcus aureus (MRSA) isolated    • ABO Grouping 02/25/2023 O    • Rh Factor 02/25/2023 Positive    • Antibody Screen 02/25/2023 Negative    • Specimen Expiration Date 02/25/2023 [id number]    • Cholesterol 02/25/2023 110    • Triglycerides 02/25/2023 45    • HDL, Direct 02/25/2023 49    • LDL Calculated 02/25/2023 52    • Non-HDL-Chol (CHOL-HDL) 02/25/2023 61    • LDL Direct 02/25/2023 57    Admission on 02/17/2023, Discharged on 02/17/2023   Component Date Value   • Ventricular Rate 02/17/2023 68    • Atrial Rate 02/17/2023 68    • OH Interval 02/17/2023 140    • QRSD Interval 02/17/2023 88    • QT Interval 02/17/2023 398    • QTC Interval 02/17/2023 423    • P Axis 02/17/2023 3    • QRS Axis 02/17/2023 -19    • T Wave Axis 02/17/2023 58    Telephone on 02/01/2023   Component Date Value   • WBC 02/04/2023 6 71    • RBC 02/04/2023 4 65    • Hemoglobin 02/04/2023 14 9    • Hematocrit 02/04/2023 44 0    • MCV 02/04/2023 95    • MCH 02/04/2023 32 0    • MCHC 02/04/2023 33 9    • RDW 02/04/2023 12 3    • MPV 02/04/2023 10 4    • Platelets 97/37/5147 205    • nRBC 02/04/2023 0    • Neutrophils Relative 02/04/2023 51    • Immat GRANS % 02/04/2023 0    • Lymphocytes Relative 02/04/2023 38    • Monocytes Relative 02/04/2023 9    • Eosinophils Relative 02/04/2023 1    • Basophils Relative 02/04/2023 1    • Neutrophils Absolute 02/04/2023 3 39    • Immature Grans Absolute 02/04/2023 0 01    • Lymphocytes Absolute 02/04/2023 2 56    • Monocytes Absolute 02/04/2023 0 62    • Eosinophils Absolute 02/04/2023 0 09    • Basophils Absolute 02/04/2023 0 04    • Sodium 02/04/2023 138    • Potassium 02/04/2023 3 8    • Chloride 02/04/2023 106    • CO2 02/04/2023 27    • ANION GAP 02/04/2023 5    • BUN 02/04/2023 17    • Creatinine 02/04/2023 1 15    • Glucose, Fasting 02/04/2023 113 (H)    • Calcium 02/04/2023 8 5    • AST 02/04/2023 15    • ALT 02/04/2023 21    • Alkaline Phosphatase 02/04/2023 70    • Total Protein 02/04/2023 7 3    • Albumin 02/04/2023 3 7    • Total Bilirubin 02/04/2023 0 58    • eGFR 02/04/2023 64      Imaging: XR chest portable    Result Date: 3/8/2023  Narrative: CHEST INDICATION:   Post Open Heart Surgey  COMPARISON:  3/6/2023  Chest CT dated 3/1/2023  EXAM PERFORMED/VIEWS:  XR CHEST PORTABLE FINDINGS:  Right IJ catheter tip in the high right atrium  Intact median sternotomy wires  Aortic valvular prosthesis  Unchanged mediastinal and pleural drains  Endotracheal tube, enteric tube, and pulmonary arterial catheter have been removed  Heart is enlarged but unchanged  Mediastinum appears widened secondary to patient rotation and low lung volumes  Hypoventilation  Streaky opacities at the right base appears similar to prior  Increasing dense left retrocardiac consolidation  Bones are unremarkable  Impression: Increasing left retrocardiac consolidation, likely atelectasis unless there is compelling clinical evidence for developing pneumonia  Workstation performed: [id number]     CT chest wo contrast    Result Date: 3/3/2023  Narrative: CT CHEST WITHOUT IV CONTRAST INDICATION:   I35 0: Nonrheumatic aortic (valve) stenosis I25 10: Atherosclerotic heart disease of native coronary artery without angina pectoris Z01 810: Encounter for preprocedural cardiovascular examination Z01 812: Encounter for preprocedural laboratory examination    "Rule out aortic calcification prior to CABG/AVR " COMPARISON:  CXR 4/3/2017  TECHNIQUE: Chest CT without intravenous contrast   Axial, sagittal, coronal 2D reformats and coronal MIPS from source data  Radiation dose length product (DLP):  569 77 mGy-cm   Radiation dose exposure minimized using iterative reconstruction and automated exposure control  FINDINGS: LUNGS:  Lungs clear  AIRWAYS: No significant filling defects  PLEURA:  Unremarkable  HEART/GREAT VESSELS:  Normal heart size  Dense calcification of the aortic valve leaflets  Normal caliber ascending aorta with no calcification  No pericardial effusion or adhesion  Moderate coronary artery calcification indicating atherosclerotic heart disease  MEDIASTINUM AND JOON:  Unremarkable  CHEST WALL AND LOWER NECK: Unremarkable  UPPER ABDOMEN:  A few tiny hypodensities, too small to characterize, likely cysts  Benign hepatic calcification  Colonic diverticuli  OSSEOUS STRUCTURES: Mild degenerative disease in the spine  Impression: Normal caliber ascending aorta with no calcification  No acute pulmonary disease  Workstation performed: [id number]     FLORIN Anesthesia    Result Date: 3/6/2023  Narrative: Juan Carlos Dumas MD     3/6/2023 11:07 AM Procedure Performed: FLORIN Anesthesia Start Time:  3/6/2023 7:48 AM Preanesthesia Checklist Patient identified, IV assessed, risks and benefits discussed, monitors and equipment assessed, procedure being performed at surgeon's request and anesthesia consent obtained  Procedure Diagnostic Indications for FLORIN:  assessment of surgical repair  Type of FLORIN: complete FLORIN with interpretation  Images Saved: ultrasound permanent image saved  Physician Requesting Echo: Chema Carrington DO  Location performed: OR  Intubated  Bite block not placed  Heart visualized  Insertion of FLORIN Probe:  Atraumatic  Probe Type:  Multiplane  Modalities:  2D only, color flow mapping, continuous wave Doppler and pulse wave Doppler   Echocardiographic and Doppler Measurements PREPROCEDURE LEFT VENTRICLE: Systolic Function: normal  Ejection Fraction: 65%  Cavity size: normal    RIGHT VENTRICLE: Systolic Function: normal   Cavity size moderately dilated  No hypertrophy  AORTIC VALVE: Leaflets: bileaflet  Leaflet motions restricted  Stenosis: severe  Regurgitation: trace  MITRAL VALVE: Leaflets: normal  Leaflet Motions: normal  Regurgitation: mild  Stenosis: none  TRICUSPID VALVE: Leaflets: normal  Leaflet Motions: normal  Stenosis: none  Regurgitation: trace  PULMONIC VALVE: Leaflets: normal  Regurgitation: none  Stenosis: none  ASCENDING AORTA: Size:  normal   Dissection not present  AORTIC ARCH: Size:  normal   dissection not present  Grade 2: severe intimal thickening without protruding atheroma  DESCENDING AORTA: Size: normal   Dissection not present  Grade 2: severe intimal thickening without protruding atheroma  RIGHT ATRIUM: Size:  normal  LEFT ATRIUM: Size: normal  LEFT ATRIAL APPENDAGE: Size: normal   ATRIAL SEPTUM: Intra-atrial septal morphology: normal   VENTRICULAR SEPTUM: Intra-ventricular septum morphology: normal  EPIAORTIC: Plaque Thickness: 0-5 mm  OTHER FINDINGS: Pericardium:  normal  Pleural Effusion:  none  POSTPROCEDURE LEFT VENTRICLE: Unchanged   RIGHT VENTRICLE: Unchanged   AORTIC VALVE: Leaflets: bioprosthetic  Stenosis: none  Mean Gradient: 6 mmHg  Regurgitation: none  Valve Size: 25 mm  MITRAL VALVE: Unchanged   TRICUSPID VALVE: Unchanged   PULMONIC VALVE: Unchanged   ATRIA: Unchanged   AORTA: Unchanged   REMOVAL: Probe Removal: atraumatic  VAS carotid complete study    Result Date: 3/2/2023  Narrative:  [facility] REPORT CLINICAL: Indications: Pre-op evaluation for TVAR  Operative History: 2023-02-17 Cardiac catheterization Risk Factors The patient has history of Hyperlipidemia  Clinical Right Pressure:  110/ mm Hg, Left Pressure:  114/ mm Hg    FINDINGS:  Right        Impression  PSV  EDV (cm/s) Direction of Flow  Ratio  Dist  ICA                 41          22                      0 55  Mid  ICA                  63          29                      0 84  Prox  ICA    Normal       56          17                      0 75  Dist CCA                  63          21                            Mid CCA                   75          17                      1 25  Prox CCA                  60          12                      0 61  Ext Carotid               99          12                      1 32  Prox Vert                 55          15  Antegrade                 Subclavian               150           0                            Innominate                98           7                             Left         Impression  PSV  EDV (cm/s)  Direction of Flow  Ratio  Dist  ICA                 56          18                      0 85  Mid  ICA                  83          22                      1 26  Prox  ICA    1 - 49%      69          18                      1 04  Dist CCA                  60          17                            Mid CCA                   66          15                      1 20  Prox CCA                  55          17                            Ext Carotid               81          13                      1 22  Prox Vert                 46          19  Antegrade                 Subclavian               117          14                               CONCLUSION: Impression RIGHT: There is no evidence of arterial disease throughout the extracranial carotid system  Vertebral artery flow is antegrade  There is no significant subclavian artery disease  LEFT: There is <50% stenosis noted in the internal carotid artery  Plaque is homogenous and smooth  Vertebral artery flow is antegrade  There is no significant subclavian artery disease  There is no previous study for comparison    SIGNATURE: Electronically Signed by: Bob Madrid DO on 2023-03-02 12:51:56 PM    XR chest portable ICU    Result Date: 3/6/2023  Narrative: CHEST INDICATION:   S/P open heart  COMPARISON:  4/3/2017, CT chest 3/1/2023 EXAM PERFORMED/VIEWS:  XR CHEST PORTABLE ICU  AP supine Images: 3 FINDINGS: -ET tube terminates about 1 7 cm above the chepe  -Enteric tube courses to the stomach  -Right IJ central line and [place] catheter terminate in the region of the atriocaval junction and main pulmonary artery respectively  Cardiomediastinal silhouette appears unremarkable  Status post median sternotomy with AVR, mediastinal and left pleural drains  Shallow depth of inspiration with resultant vascular crowding and subsegmental atelectasis medial right base  No discernible pneumothorax or layering pleural effusion on limited supine imaging  Osseous structures appear within normal limits for patient age  Impression: Lines and tubes as above  Low lung volumes with resultant vascular crowding and subsegmental atelectasis medial right base  Workstation performed: [id number]     FLORIN intraop interventional w/realtime guidance of cardiac procedures    Result Date: 3/7/2023  Narrative: This order contains the linked images for the FLORIN that was performed by the Anesthesiologist   Please see the  CARDIAC FLORIN ANESTHESIA procedure for results  Review of Systems:  Review of Systems   Musculoskeletal: Positive for arthralgias, gait problem and myalgias  Using a walker to assist with ambulation    All other systems reviewed and are negative  Physical Exam:  Physical Exam  Vitals reviewed  Constitutional:       Appearance: Normal appearance  Cardiovascular:      Rate and Rhythm: Normal rate and regular rhythm  Pulses: Normal pulses  Heart sounds: Normal heart sounds  Pulmonary:      Effort: Pulmonary effort is normal       Breath sounds: Normal breath sounds  Abdominal:      General: Bowel sounds are normal       Palpations: Abdomen is soft  Musculoskeletal:         General: Normal range of motion        Cervical back: Normal range of motion and neck supple  Right lower leg: No edema  Left lower leg: No edema  Skin:     General: Skin is warm and dry  Capillary Refill: Capillary refill takes less than 2 seconds  Comments: Sternal incision line appears clean dry intact approximated without erythema or ecchymosis  Mid sternal CT sites with slight sero sang drainage  Neurological:      General: No focal deficit present  Mental Status: He is alert and oriented to person, place, and time  Psychiatric:         Mood and Affect: Mood normal          Behavior: Behavior normal          Discussion/Summary:  1  3/06/23 sp AVR #25mm Inspiris, CABG x 1 LIMA to LAD by Dr Natalie Andrade  He was discharged home on POD#4  Continue on ASA 325mg daily, Metoprolol succinate 25mg daily, Crestor 20mg daily, DASH diet, cardiac rehabilitation in near future  He is aware of lifelong use of antibiotics one hour prior to dental procedure  2  Dyslipidemia 2/25/23 , TG 45, HDL 49, LDL 52 _ LDL at goal <70, continue on Crestor 20mg daily, DASH diet    3   Post op creat elevated, 1 34 - 1 41 GFR 49-53 instructed to hydrate, continue to monitor

## 2023-03-22 ENCOUNTER — OFFICE VISIT (OUTPATIENT)
Dept: CARDIOLOGY CLINIC | Facility: CLINIC | Age: 73
End: 2023-03-22

## 2023-03-22 VITALS
HEIGHT: 66 IN | WEIGHT: 201.4 LBS | HEART RATE: 81 BPM | DIASTOLIC BLOOD PRESSURE: 72 MMHG | BODY MASS INDEX: 32.37 KG/M2 | SYSTOLIC BLOOD PRESSURE: 98 MMHG | OXYGEN SATURATION: 96 %

## 2023-03-22 DIAGNOSIS — Z95.2 S/P AVR (AORTIC VALVE REPLACEMENT): ICD-10-CM

## 2023-03-22 DIAGNOSIS — R79.89 ELEVATED SERUM CREATININE: ICD-10-CM

## 2023-03-22 DIAGNOSIS — Z95.1 S/P CABG X 1: Primary | ICD-10-CM

## 2023-03-22 DIAGNOSIS — E78.5 DYSLIPIDEMIA: ICD-10-CM

## 2023-03-23 ENCOUNTER — HOME CARE VISIT (OUTPATIENT)
Dept: HOME HEALTH SERVICES | Facility: HOME HEALTHCARE | Age: 73
End: 2023-03-23

## 2023-03-23 VITALS — SYSTOLIC BLOOD PRESSURE: 118 MMHG | DIASTOLIC BLOOD PRESSURE: 70 MMHG | HEART RATE: 88 BPM | OXYGEN SATURATION: 96 %

## 2023-03-23 NOTE — HOME HEALTH
Pt denies having pain this date  Pt had physician apt yesterday and reports no difiuclty with car transfers  Pt no longer using AD to assist with ambulation

## 2023-03-24 ENCOUNTER — HOME CARE VISIT (OUTPATIENT)
Dept: HOME HEALTH SERVICES | Facility: HOME HEALTHCARE | Age: 73
End: 2023-03-24

## 2023-03-24 VITALS
OXYGEN SATURATION: 98 % | DIASTOLIC BLOOD PRESSURE: 82 MMHG | RESPIRATION RATE: 18 BRPM | TEMPERATURE: 97.9 F | HEART RATE: 79 BPM | SYSTOLIC BLOOD PRESSURE: 112 MMHG

## 2023-03-28 ENCOUNTER — HOME CARE VISIT (OUTPATIENT)
Dept: HOME HEALTH SERVICES | Facility: HOME HEALTHCARE | Age: 73
End: 2023-03-28

## 2023-03-28 VITALS — OXYGEN SATURATION: 99 % | DIASTOLIC BLOOD PRESSURE: 80 MMHG | HEART RATE: 86 BPM | SYSTOLIC BLOOD PRESSURE: 114 MMHG

## 2023-03-28 NOTE — HOME HEALTH
Pt denies having pain  Pt has been stair climbing 5x per week to shower on second floor  Pt denies difficulty with ADLs  Reports noticing improvement with his gait and balance

## 2023-03-30 ENCOUNTER — HOME CARE VISIT (OUTPATIENT)
Dept: HOME HEALTH SERVICES | Facility: HOME HEALTHCARE | Age: 73
End: 2023-03-30

## 2023-03-30 VITALS
DIASTOLIC BLOOD PRESSURE: 68 MMHG | RESPIRATION RATE: 20 BRPM | OXYGEN SATURATION: 98 % | HEART RATE: 85 BPM | SYSTOLIC BLOOD PRESSURE: 118 MMHG

## 2023-04-04 ENCOUNTER — HOME CARE VISIT (OUTPATIENT)
Dept: HOME HEALTH SERVICES | Facility: HOME HEALTHCARE | Age: 73
End: 2023-04-04

## 2023-04-05 ENCOUNTER — OFFICE VISIT (OUTPATIENT)
Dept: CARDIAC SURGERY | Facility: CLINIC | Age: 73
End: 2023-04-05

## 2023-04-05 VITALS
TEMPERATURE: 98.6 F | HEART RATE: 72 BPM | SYSTOLIC BLOOD PRESSURE: 100 MMHG | OXYGEN SATURATION: 98 % | BODY MASS INDEX: 32.14 KG/M2 | DIASTOLIC BLOOD PRESSURE: 68 MMHG | HEIGHT: 66 IN | WEIGHT: 200 LBS

## 2023-04-05 VITALS
OXYGEN SATURATION: 98 % | DIASTOLIC BLOOD PRESSURE: 76 MMHG | SYSTOLIC BLOOD PRESSURE: 114 MMHG | RESPIRATION RATE: 20 BRPM | HEART RATE: 67 BPM

## 2023-04-05 DIAGNOSIS — Z95.2 S/P AVR (AORTIC VALVE REPLACEMENT): Primary | ICD-10-CM

## 2023-04-05 DIAGNOSIS — Z48.89 ENCOUNTER FOR POSTOPERATIVE CARE: ICD-10-CM

## 2023-04-05 DIAGNOSIS — Z95.1 S/P CABG X 1: ICD-10-CM

## 2023-04-05 NOTE — LETTER
"April 5, 2023     [facility] [id number]    Patient: Valerie Pinon   YOB: 1950   Date of Visit: 4/5/2023       Dear Dr Pelletier Diver:    Thank you for referring Valerie Pinon to me for evaluation  Below are my notes for this consultation  If you have questions, please do not hesitate to call me  I look forward to following your patient along with you  Sincerely,        Catie Leong,         CC: Windell Ahumada, MD  [facility], [address] [facility]  4/5/2023  4:23 PM  Attested   POST OP FOLLOW UP VISIT    Procedure: 3/6/23     1  Aortic valve replacement with a 25mm Cortez Inspiris Resilia pericardial tissue valve  2  Coronary artery bypass grafting x 1 with left internal mammary artery to left anterior descending artery  History: Valerie Pinon is a 72y o  year old male who presents to our office today for routine follow up care from aortic valve replacement and coronary artery bypass grafting  Patient tolerated procedure well  Post-op course uncomplicated  He had a mild rise in creatinine to 1 57 from baseline 1 0  He also had APOBLA ( H&H 7 1/21/5) on POD #4 therefore Ferrous Sulfate and Vit C was prescribed at discharge  Repeat BMP as an out patient was stable  Patient has had routine f/u with PCP and Cardiology  Today he reports he is making good progress  HE has been receiving home PTA/OT and is feeling stronger, able to ambulate longer distances without SOB or chest pain , He has mild residual left anterior chest wall soreness  Appetite is improving  Weight remains stable and he denies edema  He denies fever or chills and states his incision has healed well  He is scheduled to begin Cardiac Rehab in less than 2 weeks       Vital Signs:   Vitals:    04/05/23 1539   BP: 100/68   BP Location: Left arm   Patient Position: Sitting   Cuff Size: Standard   Pulse: 72   Temp: 98 6 °F (37 °C)   TempSrc: Tympanic   SpO2: 98%   Weight: 90 7 kg (200 lb)   Height: 5' 6\" " (1 676 m)       Home Medications:   Prior to Admission medications    Medication Sig Start Date End Date Taking? Authorizing Provider   acetaminophen (TYLENOL) 325 mg tablet Take 2 tablets (650 mg total) by mouth every 6 (six) hours as needed for mild pain  Patient not taking: Reported on 3/22/2023 3/10/23   Cameron Solano PA-C   ascorbic acid (VITAMIN C) 1000 MG tablet Take 1 tablet (1,000 mg total) by mouth daily Do not start before March 11, 2023  3/11/23   Deedee Seaman PA-C   aspirin 325 mg tablet Take 1 tablet (325 mg total) by mouth daily Do not start before March 11, 2023  3/11/23   Deedee Seaman PA-C   ferrous sulfate 325 (65 Fe) mg tablet Take 1 tablet (325 mg total) by mouth daily with breakfast Do not start before March 11, 2023  3/11/23   Deedee Seaman PA-C   metoprolol succinate (TOPROL-XL) 25 mg 24 hr tablet Take 1 tablet (25 mg total) by mouth daily 2/17/23   MEI Rico   polyethylene glycol (GLYCOLAX) 17 GM/SCOOP powder Take 17 g by mouth daily  Patient taking differently: Take 17 g by mouth if needed 3/10/23 4/9/23  Deedee Seaman PA-C   rosuvastatin (CRESTOR) 20 MG tablet Take 1 tablet (20 mg total) by mouth daily 2/17/23   MEI Rico       Physical Exam:  General: Alert, oriented, well developed, no acute distress  HEENT/NECK:  PERRLA  No jugular venous distention  Cardiac:Regular rate and rhythm, no murmurs rubs or gallops  Pulmonary:Breath sounds clear bilaterally  Abdomen:  Non-tender, Non-distended  Positive bowel sounds  Upper extremities: 2+ radial pulses; brisk capillary refill  Lower extremities: Extremities warm/dry  PT/DP pules 2+ bilaterally  No edema B/L  Incisions: Sternum is stable  Incision is clean, dry, and intact  Musculoskeletal: MAEE, stable gait  Neuro: Alert and oriented X 3  Sensation is grossly intact  No focal deficits  Skin: Warm/Dry, without rashes or lesions      Lab Results:   Lab Results   Component Value Date    WBC 12 88 (H) 03/17/2023    HGB 9 1 (L) 03/17/2023    HCT 29 6 (L) 03/17/2023     (H) 03/17/2023     03/17/2023     Lab Results   Component Value Date    SODIUM 136 03/17/2023    K 4 8 03/17/2023     03/17/2023    CO2 28 03/17/2023    BUN 18 03/17/2023    CREATININE 1 41 (H) 03/17/2023    GLUC 127 03/17/2023    CALCIUM 8 7 03/17/2023     Lab Results   Component Value Date    HGBA1C 5 4 02/25/2023       Assessment:     Aortic stenosis, Non-Rheumatic, Coronary artery disease  S/P aortic valve replacement and coronary artery bypass grafting    Ahmet Sweet is 4 weeks post-op, making good progress in their recovery  Incisions are well-healed and the sternum is stable  Weight and VS are stable  Plan:   Medications reviewed with patient, associated questions answered and no changes made  Benefits of participating in cardiac rehab have been discussed and patient is cleared to begin the outpatient  program      May resume driving  Increase activity as tolerated and maintain alifting restriction of no more than 25 lbs until 5/29/23, which is 12 weeks from the surgical date  No further follow up in our office is needed; call with questions or concerns  Patient should maintain routine follow-up with Cardiology and Primary Care for ongoing medical care  Patient verbalizes understanding of recommendations and all questions were answered to their satisfaction  The patient recently had a screening colonoscopy in 6/30/22  Therefore GI referral is not indicated at this time  MEI Bocanegra  4/5/23  3:30PM  Attestation signed by Falguni Eaton DO at 4/5/2023  4:59 PM:  I supervised the Advanced Practitioner  ? I performed, in its entirety, the assessment/plan component of the visit    I agree with the Advanced Practitioner's note with the following additions/exceptions:      Mr Lawyer Marshall returns to the office following his aortic valve replacement and coronary bypass grafting with SALDANA to LAD  He is recovering very well  He is getting back to his regular activities  He is now released to outpatient cardiac rehab    He will continue to follow-up with his outpatient cardiologist     Chevy Schreiber DO 04/05/23

## 2023-04-05 NOTE — PROGRESS NOTES
" POST OP FOLLOW UP VISIT    Procedure: 3/6/23     1  Aortic valve replacement with a 25mm Cortez Inspiris Resilia pericardial tissue valve  2  Coronary artery bypass grafting x 1 with left internal mammary artery to left anterior descending artery  History: Kaylie Young is a 72y o  year old male who presents to our office today for routine follow up care from aortic valve replacement and coronary artery bypass grafting  Patient tolerated procedure well  Post-op course uncomplicated  He had a mild rise in creatinine to 1 57 from baseline 1 0  He also had APOBLA ( H&H 7 1/21/5) on POD #4 therefore Ferrous Sulfate and Vit C was prescribed at discharge  Repeat BMP as an out patient was stable  Patient has had routine f/u with PCP and Cardiology  Today he reports he is making good progress  HE has been receiving home PTA/OT and is feeling stronger, able to ambulate longer distances without SOB or chest pain , He has mild residual left anterior chest wall soreness  Appetite is improving  Weight remains stable and he denies edema  He denies fever or chills and states his incision has healed well  He is scheduled to begin Cardiac Rehab in less than 2 weeks  Vital Signs:   Vitals:    04/05/23 1539   BP: 100/68   BP Location: Left arm   Patient Position: Sitting   Cuff Size: Standard   Pulse: 72   Temp: 98 6 °F (37 °C)   TempSrc: Tympanic   SpO2: 98%   Weight: 90 7 kg (200 lb)   Height: 5' 6\" (1 676 m)       Home Medications:   Prior to Admission medications    Medication Sig Start Date End Date Taking?  Authorizing Provider   acetaminophen (TYLENOL) 325 mg tablet Take 2 tablets (650 mg total) by mouth every 6 (six) hours as needed for mild pain  Patient not taking: Reported on 3/22/2023 3/10/23   Mickey Solano PA-C   ascorbic acid (VITAMIN C) 1000 MG tablet Take 1 tablet (1,000 mg total) by mouth daily Do not start before March 11, 2023  3/11/23   Josh Agustin PA-C   aspirin 325 mg tablet Take 1 tablet " (325 mg total) by mouth daily Do not start before March 11, 2023  3/11/23   Kerry Angulo PA-C   ferrous sulfate 325 (65 Fe) mg tablet Take 1 tablet (325 mg total) by mouth daily with breakfast Do not start before March 11, 2023  3/11/23   Kerry Angulo PA-C   metoprolol succinate (TOPROL-XL) 25 mg 24 hr tablet Take 1 tablet (25 mg total) by mouth daily 2/17/23   MEI Olson   polyethylene glycol (GLYCOLAX) 17 GM/SCOOP powder Take 17 g by mouth daily  Patient taking differently: Take 17 g by mouth if needed 3/10/23 4/9/23  Kerry Angulo PA-C   rosuvastatin (CRESTOR) 20 MG tablet Take 1 tablet (20 mg total) by mouth daily 2/17/23   MEI Olson       Physical Exam:  General: Alert, oriented, well developed, no acute distress  HEENT/NECK:  PERRLA  No jugular venous distention  Cardiac:Regular rate and rhythm, no murmurs rubs or gallops  Pulmonary:Breath sounds clear bilaterally  Abdomen:  Non-tender, Non-distended  Positive bowel sounds  Upper extremities: 2+ radial pulses; brisk capillary refill  Lower extremities: Extremities warm/dry  PT/DP pules 2+ bilaterally  No edema B/L  Incisions: Sternum is stable  Incision is clean, dry, and intact  Musculoskeletal: MAEE, stable gait  Neuro: Alert and oriented X 3  Sensation is grossly intact  No focal deficits  Skin: Warm/Dry, without rashes or lesions  Lab Results:   Lab Results   Component Value Date    WBC 12 88 (H) 03/17/2023    HGB 9 1 (L) 03/17/2023    HCT 29 6 (L) 03/17/2023     (H) 03/17/2023     03/17/2023     Lab Results   Component Value Date    SODIUM 136 03/17/2023    K 4 8 03/17/2023     03/17/2023    CO2 28 03/17/2023    BUN 18 03/17/2023    CREATININE 1 41 (H) 03/17/2023    GLUC 127 03/17/2023    CALCIUM 8 7 03/17/2023     Lab Results   Component Value Date    HGBA1C 5 4 02/25/2023       Assessment:     Aortic stenosis, Non-Rheumatic, Coronary artery disease     S/P aortic valve replacement and coronary artery bypass grafting    Jessie Corbett is 4 weeks post-op, making good progress in their recovery  Incisions are well-healed and the sternum is stable  Weight and VS are stable  Plan:   Medications reviewed with patient, associated questions answered and no changes made  Benefits of participating in cardiac rehab have been discussed and patient is cleared to begin the outpatient  program      May resume driving  Increase activity as tolerated and maintain alifting restriction of no more than 25 lbs until 5/29/23, which is 12 weeks from the surgical date  No further follow up in our office is needed; call with questions or concerns  Patient should maintain routine follow-up with Cardiology and Primary Care for ongoing medical care  Patient verbalizes understanding of recommendations and all questions were answered to their satisfaction  The patient recently had a screening colonoscopy in 6/30/22  Therefore GI referral is not indicated at this time       MEI Parekh  4/5/23  3:30PM

## 2023-04-07 ENCOUNTER — HOME CARE VISIT (OUTPATIENT)
Dept: HOME HEALTH SERVICES | Facility: HOME HEALTHCARE | Age: 73
End: 2023-04-07

## 2023-04-07 VITALS
SYSTOLIC BLOOD PRESSURE: 118 MMHG | OXYGEN SATURATION: 97 % | DIASTOLIC BLOOD PRESSURE: 64 MMHG | RESPIRATION RATE: 20 BRPM | HEART RATE: 64 BPM

## 2023-04-09 NOTE — CASE COMMUNICATION
[facility] PT discharged this date  Cardiac Rehab appt was moved up to 4/10   Functionally indep and safe in home

## 2023-04-11 ENCOUNTER — APPOINTMENT (OUTPATIENT)
Dept: CARDIAC REHAB | Facility: CLINIC | Age: 73
End: 2023-04-11

## 2023-04-21 ENCOUNTER — APPOINTMENT (OUTPATIENT)
Dept: CARDIAC REHAB | Facility: CLINIC | Age: 73
End: 2023-04-21

## 2023-04-25 ENCOUNTER — CLINICAL SUPPORT (OUTPATIENT)
Dept: CARDIAC REHAB | Facility: CLINIC | Age: 73
End: 2023-04-25

## 2023-04-25 DIAGNOSIS — I25.10 CORONARY ARTERY DISEASE INVOLVING NATIVE CORONARY ARTERY: ICD-10-CM

## 2023-04-25 DIAGNOSIS — Z95.1 S/P CABG X 1: Primary | ICD-10-CM

## 2023-04-25 RX ORDER — ROSUVASTATIN CALCIUM 20 MG/1
20 TABLET, COATED ORAL DAILY
Qty: 90 TABLET | Refills: 3 | Status: SHIPPED | OUTPATIENT
Start: 2023-04-25

## 2023-04-27 ENCOUNTER — CLINICAL SUPPORT (OUTPATIENT)
Dept: CARDIAC REHAB | Facility: CLINIC | Age: 73
End: 2023-04-27

## 2023-04-27 DIAGNOSIS — Z95.1 S/P CABG X 1: Primary | ICD-10-CM

## 2023-04-28 ENCOUNTER — APPOINTMENT (OUTPATIENT)
Dept: CARDIAC REHAB | Facility: CLINIC | Age: 73
End: 2023-04-28

## 2023-05-02 ENCOUNTER — CLINICAL SUPPORT (OUTPATIENT)
Dept: CARDIAC REHAB | Facility: CLINIC | Age: 73
End: 2023-05-02

## 2023-05-02 DIAGNOSIS — Z95.1 S/P CABG X 1: Primary | ICD-10-CM

## 2023-05-02 NOTE — PROGRESS NOTES
Reported slight tightness/muscular soreness in R hess from exercise last week  Stayed off upper body exercises for today for rest  Will resume when feeling better  Patient educated on muscular scaring at incision site  Following sternal precaution

## 2023-05-03 ENCOUNTER — TELEPHONE (OUTPATIENT)
Dept: CARDIOLOGY CLINIC | Facility: CLINIC | Age: 73
End: 2023-05-03

## 2023-05-03 ENCOUNTER — TELEPHONE (OUTPATIENT)
Dept: CARDIAC SURGERY | Facility: CLINIC | Age: 73
End: 2023-05-03

## 2023-05-03 NOTE — TELEPHONE ENCOUNTER
Patient is s/p AVR / Cabg x 1 done on 3/6/23  Last seen for Post op visit on 4/5/23  Patient called requesting clarification regarding medications  Advised the patient that they should follow up with  PCP and cardiologist with questions regarding medication and routine care  Patient was agreeable

## 2023-05-04 ENCOUNTER — CLINICAL SUPPORT (OUTPATIENT)
Dept: CARDIAC REHAB | Facility: CLINIC | Age: 73
End: 2023-05-04

## 2023-05-04 ENCOUNTER — TELEPHONE (OUTPATIENT)
Dept: FAMILY MEDICINE CLINIC | Facility: CLINIC | Age: 73
End: 2023-05-04

## 2023-05-04 DIAGNOSIS — Z95.1 S/P CABG X 1: Primary | ICD-10-CM

## 2023-05-04 DIAGNOSIS — D64.9 POSTOPERATIVE ANEMIA: Primary | ICD-10-CM

## 2023-05-04 NOTE — TELEPHONE ENCOUNTER
Patient's wife called, she wanted to know if he still needs to stay on vitamin c & iron, she stated patient had surgery in march 2023

## 2023-05-05 ENCOUNTER — APPOINTMENT (OUTPATIENT)
Dept: CARDIAC REHAB | Facility: CLINIC | Age: 73
End: 2023-05-05
Payer: COMMERCIAL

## 2023-05-08 ENCOUNTER — APPOINTMENT (OUTPATIENT)
Dept: LAB | Facility: CLINIC | Age: 73
End: 2023-05-08

## 2023-05-08 DIAGNOSIS — D64.9 POSTOPERATIVE ANEMIA: ICD-10-CM

## 2023-05-08 LAB
BASOPHILS # BLD AUTO: 0.04 THOUSANDS/ÂΜL (ref 0–0.1)
BASOPHILS NFR BLD AUTO: 1 % (ref 0–1)
EOSINOPHIL # BLD AUTO: 0.16 THOUSAND/ÂΜL (ref 0–0.61)
EOSINOPHIL NFR BLD AUTO: 2 % (ref 0–6)
ERYTHROCYTE [DISTWIDTH] IN BLOOD BY AUTOMATED COUNT: 14.9 % (ref 11.6–15.1)
FERRITIN SERPL-MCNC: 379 NG/ML (ref 8–388)
HCT VFR BLD AUTO: 39.9 % (ref 36.5–49.3)
HGB BLD-MCNC: 12.7 G/DL (ref 12–17)
IMM GRANULOCYTES # BLD AUTO: 0.02 THOUSAND/UL (ref 0–0.2)
IMM GRANULOCYTES NFR BLD AUTO: 0 % (ref 0–2)
IRON SATN MFR SERPL: 9 % (ref 20–50)
IRON SERPL-MCNC: 27 UG/DL (ref 65–175)
LYMPHOCYTES # BLD AUTO: 2.85 THOUSANDS/ÂΜL (ref 0.6–4.47)
LYMPHOCYTES NFR BLD AUTO: 33 % (ref 14–44)
MCH RBC QN AUTO: 28.6 PG (ref 26.8–34.3)
MCHC RBC AUTO-ENTMCNC: 31.8 G/DL (ref 31.4–37.4)
MCV RBC AUTO: 90 FL (ref 82–98)
MONOCYTES # BLD AUTO: 0.88 THOUSAND/ÂΜL (ref 0.17–1.22)
MONOCYTES NFR BLD AUTO: 10 % (ref 4–12)
NEUTROPHILS # BLD AUTO: 4.72 THOUSANDS/ÂΜL (ref 1.85–7.62)
NEUTS SEG NFR BLD AUTO: 54 % (ref 43–75)
NRBC BLD AUTO-RTO: 0 /100 WBCS
PLATELET # BLD AUTO: 285 THOUSANDS/UL (ref 149–390)
PMV BLD AUTO: 11 FL (ref 8.9–12.7)
RBC # BLD AUTO: 4.44 MILLION/UL (ref 3.88–5.62)
TIBC SERPL-MCNC: 295 UG/DL (ref 250–450)
WBC # BLD AUTO: 8.67 THOUSAND/UL (ref 4.31–10.16)

## 2023-05-09 ENCOUNTER — CLINICAL SUPPORT (OUTPATIENT)
Dept: CARDIAC REHAB | Facility: CLINIC | Age: 73
End: 2023-05-09

## 2023-05-09 DIAGNOSIS — Z95.1 S/P CABG X 1: Primary | ICD-10-CM

## 2023-05-10 DIAGNOSIS — D62 ACUTE BLOOD LOSS AS CAUSE OF POSTOPERATIVE ANEMIA: Primary | ICD-10-CM

## 2023-05-11 ENCOUNTER — CLINICAL SUPPORT (OUTPATIENT)
Dept: CARDIAC REHAB | Facility: CLINIC | Age: 73
End: 2023-05-11

## 2023-05-11 DIAGNOSIS — Z95.1 S/P CABG X 1: Primary | ICD-10-CM

## 2023-05-11 NOTE — PROGRESS NOTES
Cardiac Rehabilitation Plan of Care   30 Day Reassessment          Today's date: 2023   # of Exercise Sessions Completed: 1 - Eval  Patient name: Kanchan Serrato      : 1950  Age: 72 y o  MRN: [id number]  Referring Physician: LAURIE Hamlin*  Cardiologist: Keren Jurado MD   Provider: Hay Tucker  Clinician: Tyson Clifford MS, CEP    Dx:   Encounter Diagnosis   Name Primary? • S/P CABG x 1 Yes     Date of onset: 3/6/2023      SUMMARY OF PROGRESS:  Lionel Fernandez is compliant attending cardiac rehab exercise sessions 3x/wk  He is attending S/P AVR and CABGx1  Lionel Fernandez was experiencing SOB with vigorous exercise (going up stairs) and saw cardiologist  After a few years of no FU  Testing found severe AS and stenosis at LAD (90%)  He has good healing at his incision site and has been following sternal precaution  He tolerates 40-50 mins at 3 4 - 4 3 METs  A light strength training component will be added in a future exercise session  He is tolerating progression of intensity levels to maintain RPE 4-6  Resting BP  100/66 - 122/68 with Normal response to exercise reaching 126/62- 140/70  NSR on tele with no ectopy observed  RHR 59 - 90  with Normal response to exercise reaching 90 - 112  He has added home exercise 2 days/wk which includes walking  No cardiac complaints  Lionel Fernandez did recently have a problem with muscular soreness at his incision however this has improved  He is progressing toward wt loss goals with a loss of 1 pounds  Patient has been working on  dietary modifications with the goal of rare red/processed meats, low fat dairy, reduced added sugars and refined flours  The patient is a non-smoker  PHQ-9 and JUSTYN-7 were not reassessed due to low initial scoring  Most recent assessment found PHQ-9 at a 1 suggesting 1-4 = Minimal Depression and JUSTYN-7 at a 0 suggesting 0-4  = Not anxious  When addressed, the patient denies   depression/anxiety    Patient reports excellent social/emotional support from his wife  Patient attends group educational classes on cardiac risk factor modification  His exercise program will be progressed as tolerated to maintain RPE 4-6  The patient has the following personal goals he hopes to achieved by discharge: weight loss with a goal of 190lbs, return to all activities and travel, sleep normally again, increase independence  They will continue to be educated on lifestyle modification and encouraged to supplement with a home exercise program as tolerated to reach the following goals in the next 30 days: increase home exercise, return to all activity, weight loss, maintain emotional health, continue heart healthy changes to diet  Medication compliance: Yes   Comments: Pt reports to be compliant with medications  Fall Risk: Low   Comments: Ambulates with a steady gait with no assist device and Denies a fall in the past 6 months    EKG Interpretation: NSR with occ PACs       EXERCISE ASSESSMENT and PLAN    Exercise Prescription:      Frequency: 3 days/week   Supplement with home exercise 2+ days/wk as tolerated       Minutes: 40-50        METS: 3 4-4 3            HR:    RPE: 4-6         Modalities: Treadmill, UBE, Lifecycle, NuStep and Recumbent bike      30 Day Goals for Exercise Progression:    Frequency: 3 days/week of cardiac rehab       Supplement with home exercise 2+ days/wk as tolerated    Minutes: 40-50                              >150 mins/wk of moderate intensity exercise   METS: 4-5   HR: resting + 30     RPE: 4-6   Modalities: Treadmill, Airdyne bike, UBE, Lifecycle, NuStep and Recumbent bike    Strength training:   Will be added following at least 8 weeks post surgery and 8-10 monitored sessions   Modalities: Leg Press, Chest Press, Pull Downs, Arm Curl and Seated Row    Home Exercise: Type: walking , Duration: 20-22 mins    Goals: 10% improvement in functional capacity - based on max METs achieved in fitness assessment, Reduced dyspnea with physical activity  0-10, improved DASI score by 10%, Exercise 5 days/wk, >150mins/wk of moderate intensity exercise, Resume ADLs with increased strength, Attend Rehab regularly, return to regular exercise at the gym and start a home exercise program    Progression Toward Goals:  Pt is progressing and showing improvement  toward the following goals:  increased exercise tolerance, improvement in muscular soreness at incision,   , Patient will continue to do home exercise by walking/PT exercises and will start weight lifting soon in the next 30 days, Will continue to educate and progress as tolerated  Education: benefit of exercise for CAD risk factors, home exercise guidelines, AHA guidelines to achieve >150 mins/wk of moderate exercise, RPE scale, class: Risk Factors for Heart Disease, exercise instructions/guidelines for discharge  and physical activity/exercise in extreme weather conditions   Plan:education on home exercise guidelines and home exercise 30+ mins 2 days opposite CR  Readiness to change: Action:  (Changing behavior)      NUTRITION ASSESSMENT AND PLAN    Weight control:    Starting weight: 200 lbs    Current weight:   199 lbs   Waist circumference:    Startin 5 in    Current:      Diabetes: N/A  A1c: 5 4    last measured: 23    Lipid management: Last lipid profile 23  Chol 110  TRG 45  HDL 49  LDL 57    Goals:reduced BMI to < 25, decreased body fat% <25%   (M), Improved Rate Your Plate score  >59, Wt  loss 1-2 ppw,  goal of 190 lbs  , choose lean meat (93-95%), eliminate processed meats, reduce portion sizes of meat to 3oz or less, increase intake of fish, shellfish, cook without added fat or use vegetable oil/spray, increase intake of meatless meals, use low fat dairy, reduce cheese intake or use reduced-fat, eat 3 or more servings of whole grains a day, Eat 4-5 cups of fruits and vegetables daily, use olive or canola oil in baking, choose low sodium processed foods, eliminate butter, use fat-free dressings/oneal or seldom use, choose healthy snacks: light popcorn, plain pretzels, Increase intake of nuts and seeds, seldom eat or choose low fat ice-cream, fruit juice bars or frozen yogurt , eliminate or choose low-fat sweets, daily saturated fat intake <7%/13g and seldom eat out or choose lower fat menu items    Measurable goals were based Rate Your Plate Dietary Self-Assessment  These are the areas in which the patient could score higher on the assessment  Goals include recommendations for a heart healthy diet based on American Heart Association  Progression Toward Goals: Pt is progressing and showing improvement  toward the following goals:  last 1 lb    , Patient will work on dietary modification for weight loss and heart healthy eating, increased water consumption in the next 30 days, Will continue to educate and progress as tolerated  Education: heart healthy eating  low sodium diet  wt  loss   healthy choices while dining out  portion control  Plan: Education class: Reading Food Labels, Education Class: Heart Healthy Eating, refer to medical nutrition therapy, switch to low fat cheeses, replace butter with soft spreads made with olive oil, canola or yogurt, replace refined grain bread with whole grain bread, replace unhealthy snacks with fruits & vegs, reduce portion sizes, reduce red meat 1x/wk, switch to skim or 1% milk, eat fewer desserts and sweets, avoid processed foods, remove salt shaker from table, use salt substitute like Mrs   Dash, increase utilization of fresh or dried herbs, eat more home cooked meals and eat out less often, will try new grains like brown rice, quinoa, farro, will replace sugar sweetened cereals with whole grain or oatmeal, drink more water, learn how to read food labels, replace sugar with stevia or truvia and keep added daily sugar <25g/day  Readiness to change: Action:  (Changing behavior)      PSYCHOSOCIAL ASSESSMENT AND PLAN    Emotional:  Depression assessment:  PHQ-9 = 1  1-4 = Minimal Depression            Anxiety measure:  JUSTYN-7 = 0  0-4  = Not anxious  Self-reported stress level:  2  Social support: Excellent and Patient reports excellent emotional/social support from family/wife    Goals:  Reduce perceived stress to 1-3/10, Physical Fitness in [facility] Score < 3, improved positive thoughts of well being and increased energy    Progression Toward Goals: Patient will maintain stress management and emotional health in the next 30 days, Will continue to educate and progress as tolerated , Goals met: maintaining emotional health  Education: signs/sxs of depression and benefits of a positive support system  Plan: Class: Stress and Your Health, Class: Relaxation, Exercise and Enjoy a hobby  Readiness to change: Maintenance: (Maintaining the behavior change)      OTHER CORE COMPONENTS     Tobacco:   Social History     Tobacco Use   Smoking Status Never   Smokeless Tobacco Never       Tobacco Use Intervention:   N/A:  Patient is a non-smoker     Anginal Symptoms:  None   NTG use: No prescription    Blood pressure:    Restin/66 - 122/68    Exercise: 126/62 - 140/70     Goals: consistent BP < 130/80, reduced dietary sodium <2300mg, moderate intensity exercise >150 mins/wk, medication compliance and reduce number of medications  needed for BP control    Progression Toward Goals: Pt is progressing and showing improvement  toward the following goals:  no change in BP, slight drop today however maintained with hydration  , Patient will monitor BP at home and increase hydration for low BP in the next 30 days, Will continue to educate and progress as tolerated      Education:  understanding high blood pressure and it's relationship to CAD, low sodium diet and HTN, proper use of sublingual NTG and Education class: Understanding Heart Disease  Plan: Class: Common Heart Medications, Avoid Processed foods, engage in regular exercise and monitor home BP  Readiness to change: Action:  (Changing behavior)

## 2023-05-12 ENCOUNTER — APPOINTMENT (OUTPATIENT)
Dept: CARDIAC REHAB | Facility: CLINIC | Age: 73
End: 2023-05-12
Payer: COMMERCIAL

## 2023-05-16 ENCOUNTER — CLINICAL SUPPORT (OUTPATIENT)
Dept: CARDIAC REHAB | Facility: CLINIC | Age: 73
End: 2023-05-16

## 2023-05-16 DIAGNOSIS — Z95.1 S/P CABG X 1: Primary | ICD-10-CM

## 2023-05-18 ENCOUNTER — CLINICAL SUPPORT (OUTPATIENT)
Dept: CARDIAC REHAB | Facility: CLINIC | Age: 73
End: 2023-05-18

## 2023-05-18 DIAGNOSIS — Z95.1 S/P CABG X 1: Primary | ICD-10-CM

## 2023-05-19 ENCOUNTER — APPOINTMENT (OUTPATIENT)
Dept: CARDIAC REHAB | Facility: CLINIC | Age: 73
End: 2023-05-19
Payer: COMMERCIAL

## 2023-05-19 ENCOUNTER — CLINICAL SUPPORT (OUTPATIENT)
Dept: CARDIAC REHAB | Facility: CLINIC | Age: 73
End: 2023-05-19

## 2023-05-19 DIAGNOSIS — Z95.1 S/P CABG X 1: Primary | ICD-10-CM

## 2023-05-23 ENCOUNTER — CLINICAL SUPPORT (OUTPATIENT)
Dept: CARDIAC REHAB | Facility: CLINIC | Age: 73
End: 2023-05-23

## 2023-05-23 DIAGNOSIS — Z95.1 S/P CABG X 1: Primary | ICD-10-CM

## 2023-05-25 ENCOUNTER — CLINICAL SUPPORT (OUTPATIENT)
Dept: CARDIAC REHAB | Facility: CLINIC | Age: 73
End: 2023-05-25

## 2023-05-25 ENCOUNTER — TELEPHONE (OUTPATIENT)
Dept: UROLOGY | Facility: CLINIC | Age: 73
End: 2023-05-25

## 2023-05-25 DIAGNOSIS — Z95.1 S/P CABG X 1: Primary | ICD-10-CM

## 2023-05-25 NOTE — TELEPHONE ENCOUNTER
Left a voicemail per communication sheet to remind patient to get PSA blood work for his upcoming scheduled appointment with Cullen Spurling, CRNP in our [place] office

## 2023-05-26 ENCOUNTER — APPOINTMENT (OUTPATIENT)
Dept: LAB | Facility: CLINIC | Age: 73
End: 2023-05-26

## 2023-05-26 ENCOUNTER — APPOINTMENT (OUTPATIENT)
Dept: CARDIAC REHAB | Facility: CLINIC | Age: 73
End: 2023-05-26
Payer: COMMERCIAL

## 2023-05-26 ENCOUNTER — CLINICAL SUPPORT (OUTPATIENT)
Dept: CARDIAC REHAB | Facility: CLINIC | Age: 73
End: 2023-05-26

## 2023-05-26 DIAGNOSIS — I25.10 CORONARY ARTERY DISEASE INVOLVING NATIVE CORONARY ARTERY OF NATIVE HEART WITHOUT ANGINA PECTORIS: ICD-10-CM

## 2023-05-26 DIAGNOSIS — I35.0 CRITICAL AORTIC VALVE STENOSIS: ICD-10-CM

## 2023-05-26 DIAGNOSIS — N17.9 AKI (ACUTE KIDNEY INJURY) (HCC): ICD-10-CM

## 2023-05-26 DIAGNOSIS — D62 ACUTE BLOOD LOSS AS CAUSE OF POSTOPERATIVE ANEMIA: ICD-10-CM

## 2023-05-26 DIAGNOSIS — I25.10 CAD IN NATIVE ARTERY: ICD-10-CM

## 2023-05-26 DIAGNOSIS — Z95.1 S/P CABG X 1: Primary | ICD-10-CM

## 2023-05-26 DIAGNOSIS — Z95.1 S/P CABG X 1: ICD-10-CM

## 2023-05-26 DIAGNOSIS — Z95.2 S/P AVR (AORTIC VALVE REPLACEMENT): ICD-10-CM

## 2023-05-26 DIAGNOSIS — R73.01 IMPAIRED FASTING GLUCOSE: ICD-10-CM

## 2023-05-26 LAB
ANION GAP SERPL CALCULATED.3IONS-SCNC: 8 MMOL/L (ref 4–13)
BASOPHILS # BLD AUTO: 0.04 THOUSANDS/ÂΜL (ref 0–0.1)
BASOPHILS NFR BLD AUTO: 1 % (ref 0–1)
BUN SERPL-MCNC: 18 MG/DL (ref 5–25)
CALCIUM SERPL-MCNC: 9.2 MG/DL (ref 8.4–10.2)
CHLORIDE SERPL-SCNC: 103 MMOL/L (ref 96–108)
CO2 SERPL-SCNC: 25 MMOL/L (ref 21–32)
CREAT SERPL-MCNC: 1.08 MG/DL (ref 0.6–1.3)
EOSINOPHIL # BLD AUTO: 0.15 THOUSAND/ÂΜL (ref 0–0.61)
EOSINOPHIL NFR BLD AUTO: 2 % (ref 0–6)
ERYTHROCYTE [DISTWIDTH] IN BLOOD BY AUTOMATED COUNT: 16.9 % (ref 11.6–15.1)
FERRITIN SERPL-MCNC: 57 NG/ML (ref 24–336)
GFR SERPL CREATININE-BSD FRML MDRD: 68 ML/MIN/1.73SQ M
GLUCOSE P FAST SERPL-MCNC: 104 MG/DL (ref 65–99)
HCT VFR BLD AUTO: 41.9 % (ref 36.5–49.3)
HGB BLD-MCNC: 13.1 G/DL (ref 12–17)
IMM GRANULOCYTES # BLD AUTO: 0.01 THOUSAND/UL (ref 0–0.2)
IMM GRANULOCYTES NFR BLD AUTO: 0 % (ref 0–2)
IRON SATN MFR SERPL: 16 % (ref 20–50)
IRON SERPL-MCNC: 50 UG/DL (ref 65–175)
LYMPHOCYTES # BLD AUTO: 2.7 THOUSANDS/ÂΜL (ref 0.6–4.47)
LYMPHOCYTES NFR BLD AUTO: 42 % (ref 14–44)
MCH RBC QN AUTO: 28.2 PG (ref 26.8–34.3)
MCHC RBC AUTO-ENTMCNC: 31.3 G/DL (ref 31.4–37.4)
MCV RBC AUTO: 90 FL (ref 82–98)
MONOCYTES # BLD AUTO: 0.55 THOUSAND/ÂΜL (ref 0.17–1.22)
MONOCYTES NFR BLD AUTO: 9 % (ref 4–12)
NEUTROPHILS # BLD AUTO: 3 THOUSANDS/ÂΜL (ref 1.85–7.62)
NEUTS SEG NFR BLD AUTO: 46 % (ref 43–75)
NRBC BLD AUTO-RTO: 0 /100 WBCS
PLATELET # BLD AUTO: 191 THOUSANDS/UL (ref 149–390)
PMV BLD AUTO: 10.8 FL (ref 8.9–12.7)
POTASSIUM SERPL-SCNC: 4.1 MMOL/L (ref 3.5–5.3)
RBC # BLD AUTO: 4.64 MILLION/UL (ref 3.88–5.62)
SODIUM SERPL-SCNC: 136 MMOL/L (ref 135–147)
TIBC SERPL-MCNC: 318 UG/DL (ref 250–450)
WBC # BLD AUTO: 6.45 THOUSAND/UL (ref 4.31–10.16)

## 2023-05-27 DIAGNOSIS — D50.9 IRON DEFICIENCY ANEMIA, UNSPECIFIED IRON DEFICIENCY ANEMIA TYPE: Primary | ICD-10-CM

## 2023-05-30 ENCOUNTER — APPOINTMENT (OUTPATIENT)
Dept: CARDIAC REHAB | Facility: CLINIC | Age: 73
End: 2023-05-30
Payer: COMMERCIAL

## 2023-05-30 ENCOUNTER — OFFICE VISIT (OUTPATIENT)
Dept: UROLOGY | Facility: AMBULATORY SURGERY CENTER | Age: 73
End: 2023-05-30

## 2023-05-30 VITALS
DIASTOLIC BLOOD PRESSURE: 76 MMHG | OXYGEN SATURATION: 99 % | SYSTOLIC BLOOD PRESSURE: 122 MMHG | WEIGHT: 203 LBS | BODY MASS INDEX: 32.62 KG/M2 | HEIGHT: 66 IN | HEART RATE: 71 BPM

## 2023-05-30 DIAGNOSIS — N40.1 BENIGN PROSTATIC HYPERPLASIA WITH LOWER URINARY TRACT SYMPTOMS, SYMPTOM DETAILS UNSPECIFIED: Primary | ICD-10-CM

## 2023-05-30 NOTE — PROGRESS NOTES
5/30/2023    Adia Lowe  1950  [id number]      Assessment  -Elevated PSA    Discussion/Plan  Lucy Carrillo is a 72 y o  male being managed by our office    1  Elevated PSA- unfortunately, patient did not obtain PSA prior to today's visit  His last PSA from 11/29/2022 was 6 4  Advised patient to go for PSA now, and provided patient with new prescription  We will call with results of lab work  Reviewed causes of false elevation  If PSA remains elevated, we discussed proceeding with multiparametric MRI of prostate for further evaluation  He has no other urinary complaints at this time  Call with results of PSA  He was advised to call sooner with any questions or issues     -All questions answered, patient agrees with plan      History of Present Illness  72 y o  male with a history of elevated PSA presents today for follow up  Patient was seen in the office in February 2023  He was noted to have elevated PSA of 6 4 from 11/29/2022  He was advised to repeat PSA after his cardiac surgery  Patient denies any strong family history of prostate malignancy  He denies any lower urinary tract symptoms, gross hematuria, or dysuria  Component      Latest Ref Rng & Units 7/6/2018 11/3/2021 6/27/2022 11/29/2022              7:11 AM   PSA, Total      0 0 - 4 0 ng/mL 3 8 4 7 (H) 5 9 (H) 6 4 (H)     Component      Latest Ref Rng & Units 11/29/2022           7:11 AM   PSA, Total      0 0 - 4 0 ng/mL 6 4 (H)         Review of Systems  Review of Systems   Constitutional: Negative  HENT: Negative  Respiratory: Negative  Cardiovascular: Negative  Gastrointestinal: Negative  Genitourinary: Negative for decreased urine volume, difficulty urinating, dysuria, flank pain, frequency, hematuria and urgency  Musculoskeletal: Negative  Skin: Negative  Neurological: Negative  Psychiatric/Behavioral: Negative          Past Medical History  Past Medical History:   Diagnosis Date   • Asthma    • Colon, diverticulosis    • Nonspecific reaction to 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 active tuberculosis     CXR HAS BEEN CLEAR  • Pneumonia        Past Social History  Past Surgical History:   Procedure Laterality Date   • CARDIAC CATHETERIZATION  02/17/2023    Procedure: Cardiac catheterization;  Surgeon: Magali Delcid DO;  Location: [facility]; Service: Cardiology   • CARDIAC CATHETERIZATION N/A 02/17/2023    Procedure: Cardiac Coronary Angiogram;  Surgeon: Magali Delcid DO;  Location: [facility]; Service: Cardiology   • COLONOSCOPY  11/2007    COMPLETE; DONE 11/2007 WITH NO POLYPS, +DIVERTICULA   2/14   • COLONOSCOPY  06/30/2022   • MO RPLCMT AORTIC VALVE OPN W/STENTLESS TISSUE VALVE N/A 03/06/2023    Procedure: AVR WITH 25MM INSPIRIS VALVE/ CABG X 1, LIMA to LAD;  Surgeon: Vida Villafana DO;  Location: [facility];  Service: Cardiac Surgery   • TONSILLECTOMY      LAST ASSESSED: 7/9/14       Past Family History  Family History   Problem Relation Age of Onset   • Diabetes Mother         MELLITUS   • Coronary artery disease Mother    • Coronary artery disease Father    • Stroke Father         SYNDROME       Past Social history  Social History     Socioeconomic History   • Marital status: /Civil Union     Spouse name: Not on file   • Number of children: Not on file   • Years of education: Not on file   • Highest education level: Not on file   Occupational History   • Not on file   Tobacco Use   • Smoking status: Never   • Smokeless tobacco: Never   Substance and Sexual Activity   • Alcohol use: No   • Drug use: No   • Sexual activity: Not on file   Other Topics Concern   • Not on file   Social History Narrative   • Not on file     Social Determinants of Health     Financial Resource Strain: Low Risk  (11/7/2022)    Overall Financial Resource Strain (CARDIA)    • Difficulty of Paying Living Expenses: Not hard at all   Food Insecurity: No Food Insecurity (3/7/2023)    Hunger Vital Sign • Worried About [address] in the Last Year: Never true    • Ran Out of Food in the Last Year: Never true   Transportation Needs: No Transportation Needs (3/7/2023)    PRAPARE - Transportation    • Lack of Transportation (Medical): No    • Lack of Transportation (Non-Medical): No   Physical Activity: Not on file   Stress: Not on file   Social Connections: Not on file   Intimate Partner Violence: Not on file   Housing Stability: Low Risk  (3/7/2023)    Housing Stability Vital Sign    • Unable to Pay for Housing in the Last Year: No    • Number of Places Lived in the Last Year: 1    • Unstable Housing in the Last Year: No       Current Medications  Current Outpatient Medications   Medication Sig Dispense Refill   • ascorbic acid (VITAMIN C) 1000 MG tablet Take 1 tablet (1,000 mg total) by mouth daily Do not start before March 11, 2023  90 tablet 0   • aspirin 325 mg tablet Take 1 tablet (325 mg total) by mouth daily Do not start before March 11, 2023  30 tablet 3   • ferrous sulfate 325 (65 Fe) mg tablet Take 1 tablet (325 mg total) by mouth daily with breakfast Do not start before March 11, 2023  90 tablet 0   • metoprolol succinate (TOPROL-XL) 25 mg 24 hr tablet Take 1 tablet (25 mg total) by mouth daily 30 tablet 6   • rosuvastatin (CRESTOR) 20 MG tablet Take 1 tablet (20 mg total) by mouth daily 90 tablet 3   • acetaminophen (TYLENOL) 325 mg tablet Take 2 tablets (650 mg total) by mouth every 6 (six) hours as needed for mild pain (Patient not taking: Reported on 3/22/2023)  0     No current facility-administered medications for this visit  Allergies  Allergies   Allergen Reactions   • Eggs Or Egg-Derived Products - Food Allergy Hives   • Fish-Derived Products - Food Allergy Hives   • Tuberculin, Ppd Rash       Past Medical History, Social History, Family History, medications and allergies were reviewed      Vitals  Vitals:    05/30/23 0734   BP: 122/76   BP Location: Right arm   Patient Position: "Sitting   Cuff Size: Adult   Pulse: 71   SpO2: 99%   Weight: 92 1 kg (203 lb)   Height: 5' 6\" (1 676 m)       Physical Exam  Physical Exam  Constitutional:       Appearance: Normal appearance  He is well-developed  HENT:      Head: Normocephalic  Eyes:      Pupils: Pupils are equal, round, and reactive to light  Pulmonary:      Effort: Pulmonary effort is normal    Abdominal:      Palpations: Abdomen is soft  Musculoskeletal:         General: Normal range of motion  Cervical back: Normal range of motion  Skin:     General: Skin is warm and dry  Neurological:      General: No focal deficit present  Mental Status: He is alert and oriented to person, place, and time  Psychiatric:         Mood and Affect: Mood normal          Behavior: Behavior normal          Thought Content: Thought content normal          Judgment: Judgment normal          Results    I have personally reviewed all pertinent lab results and reviewed with patient  Lab Results   Component Value Date    PSA 6 4 (H) 11/29/2022    PSA 6 4 (H) 11/29/2022    PSA 5 9 (H) 06/27/2022     Lab Results   Component Value Date    BUN 18 05/26/2023    CALCIUM 9 2 05/26/2023     05/26/2023    CO2 25 05/26/2023    CREATININE 1 08 05/26/2023    GLUCOSE 142 (H) 03/06/2023    K 4 1 05/26/2023     07/08/2014     Lab Results   Component Value Date    HCT 41 9 05/26/2023    HGB 13 1 05/26/2023    MCV 90 05/26/2023     05/26/2023    WBC 6 45 05/26/2023     No results found for this or any previous visit (from the past 1 hour(s))                          "

## 2023-06-01 ENCOUNTER — APPOINTMENT (OUTPATIENT)
Dept: LAB | Facility: CLINIC | Age: 73
End: 2023-06-01
Payer: COMMERCIAL

## 2023-06-01 ENCOUNTER — CLINICAL SUPPORT (OUTPATIENT)
Dept: CARDIAC REHAB | Facility: CLINIC | Age: 73
End: 2023-06-01

## 2023-06-01 DIAGNOSIS — Z95.1 S/P CABG X 1: Primary | ICD-10-CM

## 2023-06-01 DIAGNOSIS — R97.20 ELEVATED PSA: ICD-10-CM

## 2023-06-01 LAB — PSA SERPL-MCNC: 5.44 NG/ML (ref 0–4)

## 2023-06-01 PROCEDURE — 36415 COLL VENOUS BLD VENIPUNCTURE: CPT

## 2023-06-01 PROCEDURE — G0103 PSA SCREENING: HCPCS

## 2023-06-02 ENCOUNTER — TELEPHONE (OUTPATIENT)
Dept: UROLOGY | Facility: AMBULATORY SURGERY CENTER | Age: 73
End: 2023-06-02

## 2023-06-02 ENCOUNTER — CLINICAL SUPPORT (OUTPATIENT)
Dept: CARDIAC REHAB | Facility: CLINIC | Age: 73
End: 2023-06-02

## 2023-06-02 ENCOUNTER — APPOINTMENT (OUTPATIENT)
Dept: CARDIAC REHAB | Facility: CLINIC | Age: 73
End: 2023-06-02
Payer: COMMERCIAL

## 2023-06-02 DIAGNOSIS — Z95.1 S/P CABG X 1: Primary | ICD-10-CM

## 2023-06-02 DIAGNOSIS — R97.20 ELEVATED PSA: Primary | ICD-10-CM

## 2023-06-02 NOTE — TELEPHONE ENCOUNTER
----- Message from [id number] Fatuma Rubio sent at 6/2/2023  7:43 AM EDT -----  Repeat PSA 5 44, but remains elevated  Given persistently elevated PSA, would recommend proceeding with multiparametric MRI of prostate  Please assist with scheduling

## 2023-06-02 NOTE — TELEPHONE ENCOUNTER
Called Madisyn Ford and notified him that his PSA was elevated and it is recommended that he have a MRI he is refusing to have an MRI and states he will follow-up in 3 months with another PSA and an appointment and then we will discuss at that reviewed ultrasound read and reviewed no sex 3 days prior riding lawn more or bicycle he said he was never informed of that before scheduled him for September

## 2023-06-06 ENCOUNTER — CLINICAL SUPPORT (OUTPATIENT)
Dept: CARDIAC REHAB | Facility: CLINIC | Age: 73
End: 2023-06-06
Payer: COMMERCIAL

## 2023-06-06 DIAGNOSIS — Z95.1 S/P CABG X 1: Primary | ICD-10-CM

## 2023-06-06 PROCEDURE — 93798 PHYS/QHP OP CAR RHAB W/ECG: CPT

## 2023-06-08 ENCOUNTER — CLINICAL SUPPORT (OUTPATIENT)
Dept: CARDIAC REHAB | Facility: CLINIC | Age: 73
End: 2023-06-08
Payer: COMMERCIAL

## 2023-06-08 DIAGNOSIS — Z95.1 S/P CABG X 1: Primary | ICD-10-CM

## 2023-06-08 PROCEDURE — 93798 PHYS/QHP OP CAR RHAB W/ECG: CPT

## 2023-06-08 NOTE — PROGRESS NOTES
Cardiac Rehabilitation Plan of Care   60 Day Reassessment          Today's date: 2023   # of Exercise Sessions Completed: 21  Patient name: Angel Lee      : 1950  Age: 72 y o  MRN: [id number]  Referring Physician: LAURIE Dominguez*  Cardiologist: Renetta Nesbitt MD   Provider: [facility]  Clinician: Bernardo Woody MS, CEP    Dx:   Encounter Diagnosis   Name Primary? • S/P CABG x 1 Yes     Date of onset: 3/6/2023      SUMMARY OF PROGRESS:  Elizabeth Hutchison is compliant attending cardiac rehab exercise sessions 3x/wk  He is attending S/P AVR and CABGx1  Testing found severe AS and stenosis at LAD (90%)  Elizabeth Hutchison has had good healing at his incision sight with minimal muscular tightness  He tolerates 40-50 mins at 4 6 - 5 24 METs  A light strength training component has been added to their exercise program  He is tolerating progression of intensity levels to maintain RPE 4-6  Resting /66 - 128/78 with Normal response to exercise reaching 112/70- 142/70  NSR on tele with no ectopy observed  RHR 55- 85  with Normal response to exercise reaching 91- 121  He has added home exercise 3 days/wk which includes walking for 45-50 min  No cardiac complaints  He is not progressing toward wt loss goals with a gain of 3 pounds  Patient has been working on dietary modifications with the goal of rare red/processed meats, low fat dairy, reduced added sugars and refined flours  The patient is a non-smoker  PHQ-9 and JUSTYN-7 were not reassessed due to low initial scoring  Most recent assessment found PHQ-9 at a 1 suggesting 1-4 = Minimal Depression and JUSTYN-7 at a 0 suggesting 0-4  = Not anxious  When addressed, the patient denies depression/anxiety  He does report some stress 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 [facility]  Elizabeth Hutchison reports his stress to be manageable though as he is  Patient reports excellent social/emotional support from his wife   Patient attends group educational classes on cardiac risk factor modification  His exercise program will be progressed as tolerated to maintain RPE 4-6  The patient has the following personal goals he hopes to achieved by discharge: weight loss with a goal of being 190 lbs, return to all activities and travel, sleep normally again, increase independence  They will continue to be educated on lifestyle modification and encouraged to supplement with a home exercise program as tolerated to reach the following goals in the next 30 days: increase home exercise, return to all activity, weight loss, maintain emotional health, continue heart healthy changes to diet        Medication compliance: Yes   Comments: Pt reports to be compliant with medications  Fall Risk: Low   Comments: Ambulates with a steady gait with no assist device and Denies a fall in the past 6 months    EKG Interpretation: NSR with occ PACs       EXERCISE ASSESSMENT and PLAN    Exercise Prescription:      Frequency: 3 days/week   Supplement with home exercise 2+ days/wk as tolerated       Minutes: 40-50        METS: 3 4-4 3            HR:    RPE: 4-6         Modalities: Treadmill, UBE, Lifecycle, NuStep and Recumbent bike      30 Day Goals for Exercise Progression:    Frequency: 3 days/week of cardiac rehab       Supplement with home exercise 2+ days/wk as tolerated    Minutes: 40-50                              >150 mins/wk of moderate intensity exercise   METS: 4-5   HR: resting + 30     RPE: 4-6   Modalities: Treadmill, Airdyne bike, UBE, Lifecycle, NuStep and Recumbent bike    Strength trainin-3 days / week  12-15 repetitions  1-2 sets per modality    Modalities: Chest Press, Pull Downs, Arm Curl and Seated Row    Home Exercise: Type: walking , Duration: 20-22 mins    Goals: 10% improvement in functional capacity - based on max METs achieved in fitness assessment, Reduced dyspnea with physical activity  0-1/10, improved DASI score by 10%, Exercise 5 days/wk, >150mins/wk of moderate intensity exercise, Resume ADLs with increased strength, Attend Rehab regularly, return to regular exercise at the gym and start a home exercise program    Progression Toward Goals:  Pt is progressing and showing improvement  toward the following goals:  increased exercise tolerance, improvement in muscular soreness at incision, added weight lifitng, added home exercise   , Patient will continue to do home exercise by walking/PT exercises in the next 30 days, Will continue to educate and progress as tolerated  Education: benefit of exercise for CAD risk factors, home exercise guidelines, AHA guidelines to achieve >150 mins/wk of moderate exercise, RPE scale, class: Risk Factors for Heart Disease, exercise instructions/guidelines for discharge  and physical activity/exercise in extreme weather conditions   Plan:education on home exercise guidelines  Readiness to change: Action:  (Changing behavior)      NUTRITION ASSESSMENT AND PLAN    Weight control:    Starting weight: 200 lbs    Current weight:   203 lbs   Waist circumference:    Startin 5 in    Current:      Diabetes: N/A  A1c: 5 4    last measured: 23    Lipid management: Last lipid profile 23  Chol 110  TRG 45  HDL 49  LDL 57    Goals:reduced BMI to < 25, decreased body fat% <25%   (M), Improved Rate Your Plate score  >32, Wt  loss 1-2 ppw,  goal of 190 lbs  , choose lean meat (93-95%), eliminate processed meats, reduce portion sizes of meat to 3oz or less, increase intake of fish, shellfish, cook without added fat or use vegetable oil/spray, increase intake of meatless meals, use low fat dairy, reduce cheese intake or use reduced-fat, eat 3 or more servings of whole grains a day, Eat 4-5 cups of fruits and vegetables daily, use olive or canola oil in baking, choose low sodium processed foods, eliminate butter, use fat-free dressings/oneal or seldom use, choose healthy snacks: light popcorn, plain pretzels, Increase intake of nuts and seeds, seldom eat or choose low fat ice-cream, fruit juice bars or frozen yogurt , eliminate or choose low-fat sweets, daily saturated fat intake <7%/13g and seldom eat out or choose lower fat menu items    Measurable goals were based Rate Your Plate Dietary Self-Assessment  These are the areas in which the patient could score higher on the assessment  Goals include recommendations for a heart healthy diet based on American Heart Association  Progression Toward Goals: Pt has not made progress toward the following goals: weigth gain  , Patient will work on dietary modification for weight loss and heart healthy eating, increased water consumption in the next 30 days, Will continue to educate and progress as tolerated  Education: heart healthy eating  low sodium diet  wt  loss   healthy choices while dining out  portion control  education class: Heart Healthy Eating  education class:  Label Reading  Plan: refer to medical nutrition therapy, switch to low fat cheeses, replace butter with soft spreads made with olive oil, canola or yogurt, replace refined grain bread with whole grain bread, replace unhealthy snacks with fruits & vegs, reduce portion sizes, reduce red meat 1x/wk, switch to skim or 1% milk, eat fewer desserts and sweets, avoid processed foods, remove salt shaker from table, use salt substitute like Mrs   Dash, increase utilization of fresh or dried herbs, eat more home cooked meals and eat out less often, will try new grains like brown rice, quinoa, farro, will replace sugar sweetened cereals with whole grain or oatmeal, drink more water, learn how to read food labels, replace sugar with stevia or truvia and keep added daily sugar <25g/day  Readiness to change: Action:  (Changing behavior)      PSYCHOSOCIAL ASSESSMENT AND PLAN    Emotional:  Depression assessment:  PHQ-9 = 1  1-4 = Minimal Depression            Anxiety measure:  JUSTYN-7 = 0  0-4  = Not anxious  Self-reported stress level:  2  Social support: Excellent and Patient reports excellent emotional/social support from family/wife    Goals:  Reduce perceived stress to 1-3/10, Physical Fitness in [facility] Score < 3, improved positive thoughts of well being and increased energy    Progression Toward Goals: Patient will maintain stress management and emotional health in the next 30 days, Will continue to educate and progress as tolerated , Goals met: maintaining emotional health  Education: signs/sxs of depression and benefits of a positive support system  Plan: Class: Stress and Your Health, Class: Relaxation, Exercise and Enjoy a hobby  Readiness to change: Maintenance: (Maintaining the behavior change)      OTHER CORE COMPONENTS     Tobacco:   Social History     Tobacco Use   Smoking Status Never   Smokeless Tobacco Never       Tobacco Use Intervention:   N/A:  Patient is a non-smoker     Anginal Symptoms:  None   NTG use: No prescription    Blood pressure:    Restin/66 - 128/78    Exercise: 112/70 - 142/70     Goals: consistent BP < 130/80, reduced dietary sodium <2300mg, moderate intensity exercise >150 mins/wk, medication compliance and reduce number of medications  needed for BP control    Progression Toward Goals: Pt is progressing and showing improvement  toward the following goals:  no change in BP, slight drop today however maintained with hydration  , Patient will monitor BP at home and increase hydration for low BP in the next 30 days, Will continue to educate and progress as tolerated      Education:  understanding high blood pressure and it's relationship to CAD, low sodium diet and HTN, proper use of sublingual NTG and Education class: Understanding Heart Disease  Plan: Class: Common Heart Medications, Avoid Processed foods, engage in regular exercise and monitor home BP  Readiness to change: Action:  (Changing behavior)

## 2023-06-09 ENCOUNTER — CLINICAL SUPPORT (OUTPATIENT)
Dept: CARDIAC REHAB | Facility: CLINIC | Age: 73
End: 2023-06-09
Payer: COMMERCIAL

## 2023-06-09 ENCOUNTER — APPOINTMENT (OUTPATIENT)
Dept: CARDIAC REHAB | Facility: CLINIC | Age: 73
End: 2023-06-09
Payer: COMMERCIAL

## 2023-06-09 DIAGNOSIS — Z95.1 S/P CABG X 1: Primary | ICD-10-CM

## 2023-06-09 PROCEDURE — 93798 PHYS/QHP OP CAR RHAB W/ECG: CPT

## 2023-06-13 ENCOUNTER — CLINICAL SUPPORT (OUTPATIENT)
Dept: CARDIAC REHAB | Facility: CLINIC | Age: 73
End: 2023-06-13
Payer: COMMERCIAL

## 2023-06-13 DIAGNOSIS — Z95.1 S/P CABG X 1: Primary | ICD-10-CM

## 2023-06-13 PROCEDURE — 93798 PHYS/QHP OP CAR RHAB W/ECG: CPT

## 2023-06-15 ENCOUNTER — CLINICAL SUPPORT (OUTPATIENT)
Dept: CARDIAC REHAB | Facility: CLINIC | Age: 73
End: 2023-06-15
Payer: COMMERCIAL

## 2023-06-15 DIAGNOSIS — Z95.1 S/P CABG X 1: Primary | ICD-10-CM

## 2023-06-15 PROCEDURE — 93798 PHYS/QHP OP CAR RHAB W/ECG: CPT

## 2023-06-16 ENCOUNTER — CLINICAL SUPPORT (OUTPATIENT)
Dept: CARDIAC REHAB | Facility: CLINIC | Age: 73
End: 2023-06-16
Payer: COMMERCIAL

## 2023-06-16 ENCOUNTER — OFFICE VISIT (OUTPATIENT)
Dept: CARDIOLOGY CLINIC | Facility: CLINIC | Age: 73
End: 2023-06-16
Payer: COMMERCIAL

## 2023-06-16 ENCOUNTER — APPOINTMENT (OUTPATIENT)
Dept: CARDIAC REHAB | Facility: CLINIC | Age: 73
End: 2023-06-16
Payer: COMMERCIAL

## 2023-06-16 VITALS
WEIGHT: 206 LBS | DIASTOLIC BLOOD PRESSURE: 60 MMHG | HEART RATE: 71 BPM | BODY MASS INDEX: 33.11 KG/M2 | OXYGEN SATURATION: 97 % | SYSTOLIC BLOOD PRESSURE: 100 MMHG | HEIGHT: 66 IN

## 2023-06-16 DIAGNOSIS — Z95.1 S/P CABG X 1: Primary | ICD-10-CM

## 2023-06-16 DIAGNOSIS — Z95.2 S/P AVR (AORTIC VALVE REPLACEMENT): ICD-10-CM

## 2023-06-16 DIAGNOSIS — Z95.1 S/P CABG X 1: ICD-10-CM

## 2023-06-16 DIAGNOSIS — I25.10 CORONARY ARTERY DISEASE INVOLVING NATIVE CORONARY ARTERY: ICD-10-CM

## 2023-06-16 DIAGNOSIS — I25.10 CORONARY ARTERY DISEASE INVOLVING NATIVE CORONARY ARTERY OF NATIVE HEART WITHOUT ANGINA PECTORIS: ICD-10-CM

## 2023-06-16 DIAGNOSIS — E78.5 DYSLIPIDEMIA, GOAL LDL BELOW 70: ICD-10-CM

## 2023-06-16 DIAGNOSIS — I35.0 CRITICAL AORTIC VALVE STENOSIS: Primary | ICD-10-CM

## 2023-06-16 PROCEDURE — 99214 OFFICE O/P EST MOD 30 MIN: CPT | Performed by: INTERNAL MEDICINE

## 2023-06-16 PROCEDURE — 93798 PHYS/QHP OP CAR RHAB W/ECG: CPT

## 2023-06-16 RX ORDER — METOPROLOL SUCCINATE 25 MG/1
12.5 TABLET, EXTENDED RELEASE ORAL DAILY
Qty: 45 TABLET | Refills: 3 | Status: SHIPPED | OUTPATIENT
Start: 2023-06-16

## 2023-06-16 RX ORDER — ROSUVASTATIN CALCIUM 20 MG/1
20 TABLET, COATED ORAL DAILY
Qty: 90 TABLET | Refills: 3 | Status: SHIPPED | OUTPATIENT
Start: 2023-06-16

## 2023-06-16 NOTE — PROGRESS NOTES
Cardiology Follow Up    Crissy Dasilva  1950  [id number]  [address] [facility] [address] [place] [id number] [phone]    1  Critical aortic valve stenosis        2  Coronary artery disease involving native coronary artery of native heart without angina pectoris  Lipid Panel with Direct LDL reflex      3  S/P CABG x 1  aspirin (ECOTRIN LOW STRENGTH) 81 mg EC tablet      4  S/P AVR (aortic valve replacement)  aspirin (ECOTRIN LOW STRENGTH) 81 mg EC tablet      5  Coronary artery disease involving native coronary artery  metoprolol succinate (TOPROL-XL) 25 mg 24 hr tablet    rosuvastatin (CRESTOR) 20 MG tablet      6  Dyslipidemia, goal LDL below 70  Lipid Panel with Direct LDL reflex          Diagnoses and all orders for this visit:    Critical aortic valve stenosis    Coronary artery disease involving native coronary artery of native heart without angina pectoris  -     Lipid Panel with Direct LDL reflex; Future    S/P CABG x 1  -     aspirin (ECOTRIN LOW STRENGTH) 81 mg EC tablet; Take 1 tablet (81 mg total) by mouth daily    S/P AVR (aortic valve replacement)  -     aspirin (ECOTRIN LOW STRENGTH) 81 mg EC tablet; Take 1 tablet (81 mg total) by mouth daily    Coronary artery disease involving native coronary artery  -     metoprolol succinate (TOPROL-XL) 25 mg 24 hr tablet; Take 0 5 tablets (12 5 mg total) by mouth daily  -     rosuvastatin (CRESTOR) 20 MG tablet; Take 1 tablet (20 mg total) by mouth daily    Dyslipidemia, goal LDL below 70  -     Lipid Panel with Direct LDL reflex; Future      I had the pleasure of seeing Crissy Dasilva for a follow up visit       INTERVAL HISTORY: none    History of the presenting illness, Discussion/Summary and My Plan are as follows:::    Bibi Chaparro is a pleasant 72-year-old gentleman who I had originally seen around 2018 at which time he had moderate aortic stenosis, then saw him again around March 2023 with symptoms of shortness of breath and mild chest discomfort with exertion for the preceding 6 months as well as dizziness, underwent coronary angiography that showed an ostial LAD lesion as well as echocardiographic that showed severe/critical aortic stenosis, index 0 12, area 0 6, mean gradient 53  He underwent-  Aortic valve replacement with a 25mm Cortez Inspiris Resilia pericardial tissue valve and CABGX1 LIMA - LAD    He is going through cardiac rehabilitation and progressing well and is able to run on a treadmill as well  No cardiac symptoms  Had postoperative anemia that has resolved and his most recent hemoglobin is 13  Postoperative mild kidney injury has also resolved  Cardiac exam is unremarkable, his incisions have all healed well      Plan:    Coronary disease: New diagnosis-March 2023, revascularized through LIMA with his bio AVR-March 2023,  Continue aspirin-can decrease dose to 81 mg, statin, beta-blocker at a low dose-12 5 mg daily metoprolol succinate    Status post bioprosthetic aortic valve replacement: Check echo to establish baseline gradients  Continue aspirin 81 mg daily    Dyslipidemia: Mild but will need statin going forward, recheck lipids in 6 months    Follow-up in 6 months       Latest Reference Range & Units 03/10/23 04:25 03/17/23 08:18 05/26/23 08:05   BUN 5 - 25 mg/dL 22 18 18   Creatinine 0 60 - 1 30 mg/dL 1 34 (H) 1 41 (H) 1 08   (H): Data is abnormally high     Latest Reference Range & Units 11/03/21 07:28 11/29/22 07:11 02/25/23 07:57   Cholesterol See Comment mg/dL 179 177 110   Triglycerides See Comment mg/dL 77 51 45   HDL >=40 mg/dL 50 48 49   Non-HDL Cholesterol mg/dl 129 129 61   LDL Calculated 0 - 100 mg/dL 114 (H) 119 (H) 52   (H): Data is abnormally high     Latest Reference Range & Units 03/17/23 08:18 05/08/23 10:06 05/26/23 08:05   Hemoglobin 12 0 - 17 0 g/dL 9 1 (L) 12 7 13 1   HCT 36 5 - 49 3 % 29 6 (L) 39 9 41 9   (L): Data is abnormally low    Patient Active Problem List   Diagnosis   • ED (erectile dysfunction)   • Impaired fasting glucose   • Obesity   • Dyslipidemia   • Elevated PSA measurement   • Critical aortic valve stenosis   • Coronary artery disease involving native coronary artery   • S/P CABG x 1   • S/P AVR (aortic valve replacement)   • MINOR (acute kidney injury) (RUST 75 )   • Acute blood loss as cause of postoperative anemia   • Thrombocytopenic (RUST 75 )   • Encounter for postoperative care   • Anemia, iron deficiency     Past Medical History:   Diagnosis Date   • Asthma    • Colon, diverticulosis    • Nonspecific reaction to 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 active tuberculosis     CXR HAS BEEN CLEAR  • Pneumonia      Social History     Socioeconomic History   • Marital status: /Civil Union     Spouse name: Not on file   • Number of children: Not on file   • Years of education: Not on file   • Highest education level: Not on file   Occupational History   • Not on file   Tobacco Use   • Smoking status: Never   • Smokeless tobacco: Never   Substance and Sexual Activity   • Alcohol use: No   • Drug use: No   • Sexual activity: Not on file   Other Topics Concern   • Not on file   Social History Narrative   • Not on file     Social Determinants of Health     Financial Resource Strain: Low Risk  (11/7/2022)    Overall Financial Resource Strain (CARDIA)    • Difficulty of Paying Living Expenses: Not hard at all   Food Insecurity: No Food Insecurity (3/7/2023)    Hunger Vital Sign    • Worried About Running Out of Food in the Last Year: Never true    • Ran Out of Food in the Last Year: Never true   Transportation Needs: No Transportation Needs (3/7/2023)    PRAPARE - Transportation    • Lack of Transportation (Medical): No    • Lack of Transportation (Non-Medical):  No   Physical Activity: Not on file   Stress: Not on file   Social Connections: Not on file   Intimate Partner Violence: Not on file   Housing Stability: Low Risk  (3/7/2023) Housing Stability Vital Sign    • Unable to Pay for Housing in the Last Year: No    • Number of Places Lived in the Last Year: 1    • Unstable Housing in the Last Year: No      Family History   Problem Relation Age of Onset   • Diabetes Mother         MELLITUS   • Coronary artery disease Mother    • Coronary artery disease Father    • Stroke Father         SYNDROME     Past Surgical History:   Procedure Laterality Date   • CARDIAC CATHETERIZATION  02/17/2023    Procedure: Cardiac catheterization;  Surgeon: Shannon Pires DO;  Location: [facility]; Service: Cardiology   • CARDIAC CATHETERIZATION N/A 02/17/2023    Procedure: Cardiac Coronary Angiogram;  Surgeon: Shannon Pires DO;  Location: [facility]; Service: Cardiology   • COLONOSCOPY  11/2007    COMPLETE; DONE 11/2007 WITH NO POLYPS, +DIVERTICULA   2/14   • COLONOSCOPY  06/30/2022   • AR RPLCMT AORTIC VALVE OPN W/STENTLESS TISSUE VALVE N/A 03/06/2023    Procedure: AVR WITH 25MM INSPIRIS VALVE/ CABG X 1, LIMA to LAD;  Surgeon: Rossy Alfonso DO;  Location: [facility];  Service: Cardiac Surgery   • TONSILLECTOMY      LAST ASSESSED: 7/9/14       Current Outpatient Medications:   •  aspirin (ECOTRIN LOW STRENGTH) 81 mg EC tablet, Take 1 tablet (81 mg total) by mouth daily, Disp: 90 tablet, Rfl: 3  •  ferrous sulfate 325 (65 Fe) mg tablet, Take 1 tablet (325 mg total) by mouth daily with breakfast Do not start before March 11, 2023 , Disp: 90 tablet, Rfl: 0  •  metoprolol succinate (TOPROL-XL) 25 mg 24 hr tablet, Take 0 5 tablets (12 5 mg total) by mouth daily, Disp: 45 tablet, Rfl: 3  •  rosuvastatin (CRESTOR) 20 MG tablet, Take 1 tablet (20 mg total) by mouth daily, Disp: 90 tablet, Rfl: 3  •  acetaminophen (TYLENOL) 325 mg tablet, Take 2 tablets (650 mg total) by mouth every 6 (six) hours as needed for mild pain (Patient not taking: Reported on 3/22/2023), Disp: , Rfl: 0  Allergies   Allergen Reactions   • Eggs Or Egg-Derived Products "- Food Allergy Hives   • Fish-Derived Products - Food Allergy Hives   • Tuberculin, Ppd Rash       Imaging: No results found  Review of Systems:  Review of Systems   Constitutional: Negative  HENT: Negative  Eyes: Negative  Respiratory: Negative  Cardiovascular: Negative  Endocrine: Negative  Physical Exam:  /60 (BP Location: Right arm, Patient Position: Sitting, Cuff Size: Standard)   Pulse 71   Ht 5' 6\" (1 676 m)   Wt 93 4 kg (206 lb)   SpO2 97%   BMI 33 25 kg/m²   Physical Exam  Constitutional:       General: He is not in acute distress  Appearance: He is not ill-appearing, toxic-appearing or diaphoretic  HENT:      Nose: Nose normal  No congestion  Neck:      Vascular: No carotid bruit  Cardiovascular:      Rate and Rhythm: Normal rate and regular rhythm  Heart sounds: No murmur heard  No friction rub  No gallop  Pulmonary:      Effort: Pulmonary effort is normal  No respiratory distress  Breath sounds: No stridor  No wheezing or rhonchi  Musculoskeletal:         General: No swelling, tenderness, deformity or signs of injury  Normal range of motion  Cervical back: Normal range of motion  No rigidity or tenderness  Lymphadenopathy:      Cervical: No cervical adenopathy  Neurological:      Mental Status: He is alert  This note was completed in part utilizing [employer] direct voice recognition software  Grammatical errors, random word insertion, spelling mistakes, occasional wrong word or \"sound-alike\" substitutions and incomplete sentences may be an occasional consequence of the system secondary to software limitations, ambient noise and hardware issues  At the time of dictation, efforts were made to edit, clarify and /or correct errors  Please read the chart carefully and recognize, using context, where substitutions have occurred    If you have any questions or concerns about the context, text or information contained within " the body of this dictation, please contact myself, the provider, for further clarification

## 2023-06-20 ENCOUNTER — CLINICAL SUPPORT (OUTPATIENT)
Dept: CARDIAC REHAB | Facility: CLINIC | Age: 73
End: 2023-06-20
Payer: COMMERCIAL

## 2023-06-20 DIAGNOSIS — Z95.1 S/P CABG X 1: Primary | ICD-10-CM

## 2023-06-20 PROCEDURE — 93798 PHYS/QHP OP CAR RHAB W/ECG: CPT

## 2023-06-22 ENCOUNTER — CLINICAL SUPPORT (OUTPATIENT)
Dept: CARDIAC REHAB | Facility: CLINIC | Age: 73
End: 2023-06-22
Payer: COMMERCIAL

## 2023-06-22 DIAGNOSIS — Z95.1 S/P CABG X 1: Primary | ICD-10-CM

## 2023-06-22 PROCEDURE — 93798 PHYS/QHP OP CAR RHAB W/ECG: CPT

## 2023-06-23 ENCOUNTER — APPOINTMENT (OUTPATIENT)
Dept: CARDIAC REHAB | Facility: CLINIC | Age: 73
End: 2023-06-23
Payer: COMMERCIAL

## 2023-06-27 ENCOUNTER — CLINICAL SUPPORT (OUTPATIENT)
Dept: CARDIAC REHAB | Facility: CLINIC | Age: 73
End: 2023-06-27
Payer: COMMERCIAL

## 2023-06-27 DIAGNOSIS — Z95.1 S/P CABG X 1: Primary | ICD-10-CM

## 2023-06-27 PROCEDURE — 93798 PHYS/QHP OP CAR RHAB W/ECG: CPT

## 2023-06-29 ENCOUNTER — APPOINTMENT (OUTPATIENT)
Dept: CARDIAC REHAB | Facility: CLINIC | Age: 73
End: 2023-06-29
Payer: COMMERCIAL

## 2023-06-30 ENCOUNTER — APPOINTMENT (OUTPATIENT)
Dept: CARDIAC REHAB | Facility: CLINIC | Age: 73
End: 2023-06-30
Payer: COMMERCIAL

## 2023-07-04 ENCOUNTER — APPOINTMENT (OUTPATIENT)
Dept: CARDIAC REHAB | Facility: CLINIC | Age: 73
End: 2023-07-04
Payer: COMMERCIAL

## 2023-07-06 ENCOUNTER — APPOINTMENT (OUTPATIENT)
Dept: CARDIAC REHAB | Facility: CLINIC | Age: 73
End: 2023-07-06
Payer: COMMERCIAL

## 2023-07-07 ENCOUNTER — APPOINTMENT (OUTPATIENT)
Dept: CARDIAC REHAB | Facility: CLINIC | Age: 73
End: 2023-07-07
Payer: COMMERCIAL

## 2023-07-07 ENCOUNTER — CLINICAL SUPPORT (OUTPATIENT)
Dept: CARDIAC REHAB | Facility: CLINIC | Age: 73
End: 2023-07-07
Payer: COMMERCIAL

## 2023-07-07 DIAGNOSIS — Z95.1 S/P CABG X 1: Primary | ICD-10-CM

## 2023-07-07 PROCEDURE — 93798 PHYS/QHP OP CAR RHAB W/ECG: CPT

## 2023-07-07 NOTE — PROGRESS NOTES
Cardiac Rehabilitation Plan of Care   90 Day Reassessment          Today's date: 2023   # of Exercise Sessions Completed: 29  Patient name: Nicolette Cash      : 1950  Age: 72 y.o. MRN: [id number]  Referring Physician: LAURIE Silverman Mai*  Cardiologist: Dot Moon MD   Provider: Dany Parra  Clinician: Alisson Coy MS, CEP    Dx:   Encounter Diagnosis   Name Primary? • S/P CABG x 1 Yes     Date of onset: 3/6/2023      SUMMARY OF PROGRESS:  Mary Benson is compliant attending cardiac rehab exercise sessions 3x/wk. He is attending S/P AVR and CABGx1. Testing found severe AS and stenosis at LAD (90%). He was away last week for vacation and will be out next week as well going on vacation to [facility]. He tolerates 40-50 mins at 3.7 - 5.24 METs plus strength training. He is tolerating progression of intensity levels to maintain RPE 4-6. Resting /60 - 124/70 with Normal response to exercise reaching 110/60 - 138/74. NSR on tele with no ectopy observed. RHR 56 - 81 with Normal response to exercise reaching 108 - 141. He has added home exercise 3 days/wk which includes walking for 45-50 min. No cardiac complaints and has reported improvements in SOB with higher activity. He is not progressing toward wt loss goals with a gain of 8 pounds. Patient has been working on dietary modifications with the goal of rare red/processed meats, low fat dairy, reduced added sugars and refined flours. He is also encouraged to hydrate well due to more vigorous exercise and sweating. The patient is a non-smoker. PHQ-9 and JUSTYN-7 were not reassessed due to low initial scoring. Most recent assessment found PHQ-9 at a 1 suggesting 1-4 = Minimal Depression and JUSTYN-7 at a 0 suggesting 0-4  = Not anxious. When addressed, the patient denies depression/anxiety. 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 Mary Benson reports his stress to be manageable.   Patient reports excellent social/emotional support from his wife. Patient attends group educational classes on cardiac risk factor modification. His exercise program will be progressed as tolerated to maintain RPE 4-6. The patient has the following personal goals he hopes to achieved by discharge: weight loss with a goal of being 190 lbs, return to all activities and travel, sleep normally again, increase independence. They will continue to be educated on lifestyle modification and encouraged to supplement with a home exercise program as tolerated to reach the following goals in the next 30 days: increase home exercise, return to all activity, weight loss, maintain emotional health, continue heart healthy changes to diet.       Medication compliance: Yes   Comments: Pt reports to be compliant with medications  Fall Risk: Low   Comments: Ambulates with a steady gait with no assist device and Denies a fall in the past 6 months    EKG Interpretation: NSR with occ PACs       EXERCISE ASSESSMENT and PLAN    Exercise Prescription:      Frequency: 3 days/week   Supplement with home exercise 2+ days/wk as tolerated       Minutes: 40-50        METS: 3.7 - 5.24           HR: 108 -141    RPE: 4-6         Modalities: Treadmill, UBE, Lifecycle, Elliptical, NuStep and Recumbent bike      30 Day Goals for Exercise Progression:    Frequency: 3 days/week of cardiac rehab       Supplement with home exercise 2+ days/wk as tolerated    Minutes: 40-50                              >150 mins/wk of moderate intensity exercise   METS: 4-5   HR: 97 - 127 (65-85% of age predicted HRmax)    RPE: 4-6   Modalities: Treadmill, UBE, Lifecycle, Elliptical, NuStep and Recumbent bike    Strength trainin-3 days / week  12-15 repetitions  1-2 sets per modality    Modalities: Chest Press, Pull Downs, Arm Curl and Seated Row    Home Exercise: Type: walking , Duration: 20-22 mins    Goals: 10% improvement in functional capacity - based on max METs achieved in fitness assessment, Reduced dyspnea with physical activity  0-1/10, improved DASI score by 10%, Exercise 5 days/wk, >150mins/wk of moderate intensity exercise, Resume ADLs with increased strength, Attend Rehab regularly, return to regular exercise at the gym and start a home exercise program    Progression Toward Goals:  Pt is progressing and showing improvement  toward the following goals:  increased exercise tolerance, improvement in muscular soreness at incision, added weight lifitng, added home exercise.  , Patient will continue to do home exercise by walking/PT exercises in the next 30 days, Will continue to educate and progress as tolerated. Education: benefit of exercise for CAD risk factors, home exercise guidelines, AHA guidelines to achieve >150 mins/wk of moderate exercise, RPE scale, class: Risk Factors for Heart Disease, exercise instructions/guidelines for discharge  and physical activity/exercise in extreme weather conditions   Plan:education on home exercise guidelines  Readiness to change: Action:  (Changing behavior)      NUTRITION ASSESSMENT AND PLAN    Weight control:    Starting weight: 200 lbs    Current weight:   208 lbs   Waist circumference:    Startin.5 in    Current:      Diabetes: N/A  A1c: 5.4    last measured: 23    Lipid management: Last lipid profile 23  Chol 110  TRG 45  HDL 49  LDL 57    Goals:reduced BMI to < 25, decreased body fat% <25%   (M), Improved Rate Your Plate score  >36, Wt. loss 1-2 ppw,  goal of 190 lbs. , choose lean meat (93-95%), eliminate processed meats, reduce portion sizes of meat to 3oz or less, increase intake of fish, shellfish, cook without added fat or use vegetable oil/spray, increase intake of meatless meals, use low fat dairy, reduce cheese intake or use reduced-fat, eat 3 or more servings of whole grains a day, Eat 4-5 cups of fruits and vegetables daily, use olive or canola oil in baking, choose low sodium processed foods, eliminate butter, use fat-free dressings/oneal or seldom use, choose healthy snacks: light popcorn, plain pretzels, Increase intake of nuts and seeds, seldom eat or choose low fat ice-cream, fruit juice bars or frozen yogurt , eliminate or choose low-fat sweets, daily saturated fat intake <7%/13g and seldom eat out or choose lower fat menu items    Measurable goals were based Rate Your Plate Dietary Self-Assessment. These are the areas in which the patient could score higher on the assessment. Goals include recommendations for a heart healthy diet based on American Heart Association. Progression Toward Goals: Pt has not made progress toward the following goals: weight gain. , Patient will work on dietary modification for weight loss and heart healthy eating, increased water consumption in the next 30 days, Will continue to educate and progress as tolerated. Education: heart healthy eating  low sodium diet  wt. loss   healthy choices while dining out  portion control  education class: Heart Healthy Eating  education class:  Label Reading  Plan: refer to medical nutrition therapy, switch to low fat cheeses, replace butter with soft spreads made with olive oil, canola or yogurt, replace refined grain bread with whole grain bread, replace unhealthy snacks with fruits & vegs, reduce portion sizes, reduce red meat 1x/wk, switch to skim or 1% milk, eat fewer desserts and sweets, avoid processed foods, remove salt shaker from table, use salt substitute like Mrs. Eisenberg, increase utilization of fresh or dried herbs, eat more home cooked meals and eat out less often, will try new grains like brown rice, quinoa, farro, will replace sugar sweetened cereals with whole grain or oatmeal, drink more water, learn how to read food labels, replace sugar with stevia or truvia and keep added daily sugar <25g/day  Readiness to change: Action:  (Changing behavior)      PSYCHOSOCIAL ASSESSMENT AND PLAN    Emotional:  Depression assessment:  PHQ-9 = 1  1-4 = Minimal Depression            Anxiety measure:  JUSTYN-7 = 0  0-4  = Not anxious  Self-reported stress level:  2  Social support: Excellent and Patient reports excellent emotional/social support from family/wife    Goals:  Reduce perceived stress to 1-3/10, Physical Fitness in [facility] Score < 3, improved positive thoughts of well being and increased energy    Progression Toward Goals: Patient will maintain stress management and emotional health in the next 30 days, Will continue to educate and progress as tolerated., Goals met: maintaining emotional health. Education: signs/sxs of depression, benefits of a positive support system, stress management techniques, depression and CAD, benefits of mental health counseling, class:  Stress and Your Health  and class:  Relaxation  Plan: Exercise and Enjoy a hobby  Readiness to change: Maintenance: (Maintaining the behavior change)      OTHER CORE COMPONENTS     Tobacco:   Social History     Tobacco Use   Smoking Status Never   Smokeless Tobacco Never       Tobacco Use Intervention:   N/A:  Patient is a non-smoker     Anginal Symptoms:  None   NTG use: No prescription    Blood pressure:    Restin/60- 124/70   Exercise: 110/60- 138/74    Goals: consistent BP < 130/80, reduced dietary sodium <2300mg, moderate intensity exercise >150 mins/wk, medication compliance and reduce number of medications  needed for BP control    Progression Toward Goals: Pt is progressing and showing improvement  toward the following goals:  no change in BP, slight drop today however maintained with hydration. , Patient will monitor BP at home and increase hydration for low BP in the next 30 days, Will continue to educate and progress as tolerated.     Education:  understanding high blood pressure and it's relationship to CAD, low sodium diet and HTN, proper use of sublingual NTG, Education class:  Common Heart Medications and Education class: Understanding Heart Disease  Plan: Avoid Processed foods, engage in regular exercise and monitor home BP  Readiness to change: Action:  (Changing behavior)

## 2023-07-11 ENCOUNTER — APPOINTMENT (OUTPATIENT)
Dept: CARDIAC REHAB | Facility: CLINIC | Age: 73
End: 2023-07-11
Payer: COMMERCIAL

## 2023-07-13 ENCOUNTER — APPOINTMENT (OUTPATIENT)
Dept: CARDIAC REHAB | Facility: CLINIC | Age: 73
End: 2023-07-13
Payer: COMMERCIAL

## 2023-07-14 ENCOUNTER — APPOINTMENT (OUTPATIENT)
Dept: CARDIAC REHAB | Facility: CLINIC | Age: 73
End: 2023-07-14
Payer: COMMERCIAL

## 2023-07-18 ENCOUNTER — APPOINTMENT (OUTPATIENT)
Dept: CARDIAC REHAB | Facility: CLINIC | Age: 73
End: 2023-07-18
Payer: COMMERCIAL

## 2023-07-20 ENCOUNTER — APPOINTMENT (OUTPATIENT)
Dept: CARDIAC REHAB | Facility: CLINIC | Age: 73
End: 2023-07-20
Payer: COMMERCIAL

## 2023-07-21 ENCOUNTER — CLINICAL SUPPORT (OUTPATIENT)
Dept: CARDIAC REHAB | Facility: CLINIC | Age: 73
End: 2023-07-21
Payer: COMMERCIAL

## 2023-07-21 DIAGNOSIS — Z95.1 S/P CABG X 1: Primary | ICD-10-CM

## 2023-07-21 PROCEDURE — 93798 PHYS/QHP OP CAR RHAB W/ECG: CPT

## 2023-07-25 ENCOUNTER — CLINICAL SUPPORT (OUTPATIENT)
Dept: CARDIAC REHAB | Facility: CLINIC | Age: 73
End: 2023-07-25
Payer: COMMERCIAL

## 2023-07-25 DIAGNOSIS — Z95.1 S/P CABG X 1: Primary | ICD-10-CM

## 2023-07-25 PROCEDURE — 93798 PHYS/QHP OP CAR RHAB W/ECG: CPT

## 2023-07-27 ENCOUNTER — CLINICAL SUPPORT (OUTPATIENT)
Dept: CARDIAC REHAB | Facility: CLINIC | Age: 73
End: 2023-07-27
Payer: COMMERCIAL

## 2023-07-27 DIAGNOSIS — Z95.1 S/P CABG X 1: Primary | ICD-10-CM

## 2023-07-27 PROCEDURE — 93798 PHYS/QHP OP CAR RHAB W/ECG: CPT

## 2023-07-28 ENCOUNTER — CLINICAL SUPPORT (OUTPATIENT)
Dept: CARDIAC REHAB | Facility: CLINIC | Age: 73
End: 2023-07-28
Payer: COMMERCIAL

## 2023-07-28 DIAGNOSIS — Z95.1 S/P CABG X 1: Primary | ICD-10-CM

## 2023-07-28 PROCEDURE — 93798 PHYS/QHP OP CAR RHAB W/ECG: CPT

## 2023-08-01 ENCOUNTER — CLINICAL SUPPORT (OUTPATIENT)
Dept: CARDIAC REHAB | Facility: CLINIC | Age: 73
End: 2023-08-01
Payer: COMMERCIAL

## 2023-08-01 DIAGNOSIS — Z95.1 S/P CABG X 1: Primary | ICD-10-CM

## 2023-08-01 PROCEDURE — 93798 PHYS/QHP OP CAR RHAB W/ECG: CPT

## 2023-08-03 ENCOUNTER — CLINICAL SUPPORT (OUTPATIENT)
Dept: CARDIAC REHAB | Facility: CLINIC | Age: 73
End: 2023-08-03
Payer: COMMERCIAL

## 2023-08-03 DIAGNOSIS — Z95.1 S/P CABG X 1: Primary | ICD-10-CM

## 2023-08-03 PROCEDURE — 93798 PHYS/QHP OP CAR RHAB W/ECG: CPT

## 2023-08-04 ENCOUNTER — CLINICAL SUPPORT (OUTPATIENT)
Dept: CARDIAC REHAB | Facility: CLINIC | Age: 73
End: 2023-08-04
Payer: COMMERCIAL

## 2023-08-04 ENCOUNTER — APPOINTMENT (OUTPATIENT)
Dept: CARDIAC REHAB | Facility: CLINIC | Age: 73
End: 2023-08-04
Payer: COMMERCIAL

## 2023-08-04 DIAGNOSIS — Z95.1 S/P CABG X 1: Primary | ICD-10-CM

## 2023-08-04 PROCEDURE — 93798 PHYS/QHP OP CAR RHAB W/ECG: CPT

## 2023-08-04 NOTE — PROGRESS NOTES
Cardiac Rehabilitation Plan of Care   Discharge          Today's date: 2023   # of Exercise Sessions Completed: 36  Patient name: Bishop Brody      : 1950  Age: 72 y.o. MRN: [id number]  Referring Physician: LAURIE Khan*  Cardiologist: Gaby Nation MD   Provider: Snatiago Ojeda  Clinician: Lisa Lopez MS, CEP    Dx:   Encounter Diagnosis   Name Primary? • S/P CABG x 1 Yes     Date of onset: 3/6/2023      SUMMARY OF PROGRESS:  Discharge note for [facility]. He is attending S/P AVR and CABGx1. Testing found severe AS and stenosis at LAD (90%). He had 35.2% improvement in functional capacity increasing His max METs in the submaximal TM ETT  from 4.3 to 5.8 with test termination of RHR +30. His exercise tolerance (max METs in tolerated in cardiac rehab) increased by 38.2%. He had a 72.2% improvement in the DUKE activity estimated MET level with ADLs and physical activity. His [facility] QOL improved by 33.3%. PHQ-9 score decreased from 1 to 0 suggesting no depression. JUSTYN-7 maintained at 0 suggesting no anxiety. His weight increased by 4 pounds. Rate Your Plate score improved from 51 to 57. [facility] has been supplementing CR sessions with home exercise which includes walking and plans on joining his community fitness center for regular exercise in his day. He reports increased stamina, strength and reduced SOB with activity. [facility] has good healing at his incision returning to all ADLs and work. Massimo tolerates 40-50 mins at 3.0 - 4.7 METs plus wt training. NSR on telemetry. RHR 52 - 79, ExHR 95 - 127. Resting /62 - 120/70 with appropriate response to exercise reaching 120/60 - 148/78. All group education classes on cardiac risk factor modification were attended by the patient. Discharge plans include continuing exercise, work on weight loss, decrease red meat. Encouraged Pt to continue exercise. Frequency: 4-6 days/wk, Intensity: RPE 4-5, Time: 40-50 mins daily, 150-200 mins/wk.  Pt was encouraged to continue eating heart healthy. Pt was encouraged to remain compliant with medications and f/u with cardiologist with any cardiac symptoms, medication management and updated lipid profile. Medication compliance: Yes   Comments: Pt reports to be compliant with medications  Fall Risk: Low   Comments: Ambulates with a steady gait with no assist device and Denies a fall in the past 6 months    EKG Interpretation: NSR with occ PACs       EXERCISE ASSESSMENT and PLAN    Exercise Prescription:      Frequency: 3 days/week   Supplement with home exercise 2+ days/wk as tolerated       Minutes: 40-50        METS: 3.7 - 5.24           HR: 108 -141    RPE: 4-6         Modalities: Treadmill, UBE, Lifecycle, Elliptical, NuStep and Recumbent bike      Exercise Progression after Discharge:    Frequency: 3-5 days of gym or home exercise   Minutes: 30-50  >150 mins/wk of moderate intensity exercise   METS: 3 - 6   HR: 90 - 120    RPE: 4-6   Modalities: Treadmill, UBE, Lifecycle, Elliptical and Recumbent bike    Strength trainin-3 days / week  12-15 repetitions  1-2 sets per modality    Modalities: Chest Press, Pull Downs, Arm Curl and Seated Row    Home Exercise: Type: walking , Duration: 20-22 mins    Goals: 10% improvement in functional capacity - based on max METs achieved in fitness assessment, Reduced dyspnea with physical activity  0-1/10, improved DASI score by 10%, Exercise 5 days/wk, >150mins/wk of moderate intensity exercise, Resume ADLs with increased strength, Attend Rehab regularly, return to regular exercise at the gym and start a home exercise program    Progression Toward Goals: Will continue to educate and progress as tolerated., Goals met: increased peak and max METs, exercise at gym., Patient will be encouraged to focus on lifestyle modifications following discharge.     Education: benefit of exercise for CAD risk factors, home exercise guidelines, AHA guidelines to achieve >150 mins/wk of moderate exercise, RPE scale, class: Risk Factors for Heart Disease, exercise instructions/guidelines for discharge  and physical activity/exercise in extreme weather conditions   Plan:education on home exercise guidelines  Readiness to change: Maintenance: (Maintaining the behavior change)      NUTRITION ASSESSMENT AND PLAN    Weight control:    Starting weight: 200 lbs    Current weight:   204 lbs   Waist circumference:    Startin.5 in      Diabetes: N/A  A1c: 5.4    last measured: 23    Lipid management: Last lipid profile 23  Chol 110  TRG 45  HDL 49  LDL 57    Goals:reduced BMI to < 25, decreased body fat% <25%   (M), Improved Rate Your Plate score  >73, Wt. loss 1-2 ppw,  goal of 190 lbs. , choose lean meat (93-95%), eliminate processed meats, reduce portion sizes of meat to 3oz or less, increase intake of fish, shellfish, cook without added fat or use vegetable oil/spray, increase intake of meatless meals, use low fat dairy, reduce cheese intake or use reduced-fat, eat 3 or more servings of whole grains a day, Eat 4-5 cups of fruits and vegetables daily, use olive or canola oil in baking, choose low sodium processed foods, eliminate butter, use fat-free dressings/oneal or seldom use, choose healthy snacks: light popcorn, plain pretzels, Increase intake of nuts and seeds, seldom eat or choose low fat ice-cream, fruit juice bars or frozen yogurt , eliminate or choose low-fat sweets, daily saturated fat intake <7%/13g and seldom eat out or choose lower fat menu items    Measurable goals were based Rate Your Plate Dietary Self-Assessment. These are the areas in which the patient could score higher on the assessment. Goals include recommendations for a heart healthy diet based on American Heart Association. Progression Toward Goals: Goals not met: weight gain.  , Goals met: improved rate your plate., Patient will be encouraged to focus on lifestyle modifications following discharge.     Education: heart healthy eating  low sodium diet  wt. loss   healthy choices while dining out  portion control  education class: Heart Healthy Eating  education class:  Label Reading  Plan: refer to medical nutrition therapy, switch to low fat cheeses, replace butter with soft spreads made with olive oil, canola or yogurt, replace refined grain bread with whole grain bread, replace unhealthy snacks with fruits & vegs, reduce portion sizes, reduce red meat 1x/wk, switch to skim or 1% milk, eat fewer desserts and sweets, avoid processed foods, remove salt shaker from table, use salt substitute like Mrs. Eisenberg, increase utilization of fresh or dried herbs, eat more home cooked meals and eat out less often, will try new grains like brown rice, quinoa, farro, will replace sugar sweetened cereals with whole grain or oatmeal, drink more water, learn how to read food labels, replace sugar with stevia or truvia and keep added daily sugar <25g/day  Readiness to change: Action:  (Changing behavior)      PSYCHOSOCIAL ASSESSMENT AND PLAN    Emotional:  Depression assessment:  PHQ-9 = 0  0 =No Depression            Anxiety measure:  JUSTYN-7 = 0  0-4  = Not anxious  Self-reported stress level:  2  Social support: Excellent and Patient reports excellent emotional/social support from family/wife    Goals:  Reduce perceived stress to 1-3/10, Physical Fitness in [facility] Score < 3, improved positive thoughts of well being and increased energy    Progression Toward Goals: Will continue to educate and progress as tolerated., Goals met: maintaining emotional health., Patient will be encouraged to focus on lifestyle modifications following discharge.     Education: signs/sxs of depression, benefits of a positive support system, stress management techniques, depression and CAD, benefits of mental health counseling, class:  Stress and Your Health  and class:  Relaxation  Plan: Exercise and Enjoy a hobby  Readiness to change: Maintenance: (Maintaining the behavior change)      OTHER CORE COMPONENTS     Tobacco:   Social History     Tobacco Use   Smoking Status Never   Smokeless Tobacco Never       Tobacco Use Intervention:   N/A:  Patient is a non-smoker     Anginal Symptoms:  None   NTG use: No prescription    Blood pressure:    Restin/62 - 120/70    Exercise: 120/60 - 148/78     Goals: consistent BP < 130/80, reduced dietary sodium <2300mg, moderate intensity exercise >150 mins/wk, medication compliance and reduce number of medications  needed for BP control    Progression Toward Goals: Will continue to educate and progress as tolerated., Goals met: BP stable, compliance to meds . , Patient will be encouraged to focus on lifestyle modifications following discharge.     Education:  understanding high blood pressure and it's relationship to CAD, low sodium diet and HTN, proper use of sublingual NTG, Education class:  Common Heart Medications and Education class: Understanding Heart Disease  Plan: engage in regular exercise and monitor home BP  Readiness to change: Action:  (Changing behavior)

## 2023-08-08 ENCOUNTER — APPOINTMENT (OUTPATIENT)
Dept: CARDIAC REHAB | Facility: CLINIC | Age: 73
End: 2023-08-08
Payer: COMMERCIAL

## 2023-08-29 ENCOUNTER — TELEPHONE (OUTPATIENT)
Dept: CARDIOLOGY CLINIC | Facility: CLINIC | Age: 73
End: 2023-08-29

## 2023-08-29 DIAGNOSIS — Z95.2 S/P AVR (AORTIC VALVE REPLACEMENT): Primary | ICD-10-CM

## 2023-08-29 RX ORDER — AMOXICILLIN 500 MG/1
2000 CAPSULE ORAL ONCE
Qty: 4 CAPSULE | Refills: 0 | Status: SHIPPED | OUTPATIENT
Start: 2023-08-29 | End: 2023-08-29

## 2023-08-29 NOTE — TELEPHONE ENCOUNTER
Patient is S/P AVR and has a dental appointment tomorrow. Please advise on pre dental antibiotic. No antibiotic allergies noted.

## 2023-11-11 ENCOUNTER — APPOINTMENT (OUTPATIENT)
Dept: LAB | Facility: CLINIC | Age: 73
End: 2023-11-11
Payer: COMMERCIAL

## 2023-11-11 DIAGNOSIS — D50.9 IRON DEFICIENCY ANEMIA, UNSPECIFIED IRON DEFICIENCY ANEMIA TYPE: ICD-10-CM

## 2023-11-11 LAB
ALBUMIN SERPL BCP-MCNC: 4.1 G/DL (ref 3.5–5)
ALP SERPL-CCNC: 72 U/L (ref 34–104)
ALT SERPL W P-5'-P-CCNC: 22 U/L (ref 7–52)
ANION GAP SERPL CALCULATED.3IONS-SCNC: 7 MMOL/L
AST SERPL W P-5'-P-CCNC: 19 U/L (ref 13–39)
BASOPHILS # BLD AUTO: 0.04 THOUSANDS/ÂΜL (ref 0–0.1)
BASOPHILS NFR BLD AUTO: 1 % (ref 0–1)
BILIRUB SERPL-MCNC: 0.53 MG/DL (ref 0.2–1)
BUN SERPL-MCNC: 20 MG/DL (ref 5–25)
CALCIUM SERPL-MCNC: 8.9 MG/DL (ref 8.4–10.2)
CHLORIDE SERPL-SCNC: 104 MMOL/L (ref 96–108)
CO2 SERPL-SCNC: 26 MMOL/L (ref 21–32)
CREAT SERPL-MCNC: 1.29 MG/DL (ref 0.6–1.3)
EOSINOPHIL # BLD AUTO: 0.15 THOUSAND/ÂΜL (ref 0–0.61)
EOSINOPHIL NFR BLD AUTO: 2 % (ref 0–6)
ERYTHROCYTE [DISTWIDTH] IN BLOOD BY AUTOMATED COUNT: 12.9 % (ref 11.6–15.1)
FERRITIN SERPL-MCNC: 30 NG/ML (ref 24–336)
GFR SERPL CREATININE-BSD FRML MDRD: 55 ML/MIN/1.73SQ M
GLUCOSE P FAST SERPL-MCNC: 123 MG/DL (ref 65–99)
HCT VFR BLD AUTO: 45.2 % (ref 36.5–49.3)
HGB BLD-MCNC: 15.2 G/DL (ref 12–17)
IMM GRANULOCYTES # BLD AUTO: 0.02 THOUSAND/UL (ref 0–0.2)
IMM GRANULOCYTES NFR BLD AUTO: 0 % (ref 0–2)
IRON SATN MFR SERPL: 26 % (ref 15–50)
IRON SERPL-MCNC: 96 UG/DL (ref 50–212)
LYMPHOCYTES # BLD AUTO: 2.58 THOUSANDS/ÂΜL (ref 0.6–4.47)
LYMPHOCYTES NFR BLD AUTO: 35 % (ref 14–44)
MCH RBC QN AUTO: 32.4 PG (ref 26.8–34.3)
MCHC RBC AUTO-ENTMCNC: 33.6 G/DL (ref 31.4–37.4)
MCV RBC AUTO: 96 FL (ref 82–98)
MONOCYTES # BLD AUTO: 0.65 THOUSAND/ÂΜL (ref 0.17–1.22)
MONOCYTES NFR BLD AUTO: 9 % (ref 4–12)
NEUTROPHILS # BLD AUTO: 3.93 THOUSANDS/ÂΜL (ref 1.85–7.62)
NEUTS SEG NFR BLD AUTO: 53 % (ref 43–75)
NRBC BLD AUTO-RTO: 0 /100 WBCS
PLATELET # BLD AUTO: 176 THOUSANDS/UL (ref 149–390)
PMV BLD AUTO: 10.4 FL (ref 8.9–12.7)
POTASSIUM SERPL-SCNC: 4.7 MMOL/L (ref 3.5–5.3)
PROT SERPL-MCNC: 6.8 G/DL (ref 6.4–8.4)
RBC # BLD AUTO: 4.69 MILLION/UL (ref 3.88–5.62)
SODIUM SERPL-SCNC: 137 MMOL/L (ref 135–147)
TIBC SERPL-MCNC: 364 UG/DL (ref 250–450)
UIBC SERPL-MCNC: 268 UG/DL (ref 155–355)
WBC # BLD AUTO: 7.37 THOUSAND/UL (ref 4.31–10.16)

## 2023-11-11 PROCEDURE — 82728 ASSAY OF FERRITIN: CPT

## 2023-11-11 PROCEDURE — 85025 COMPLETE CBC W/AUTO DIFF WBC: CPT

## 2023-11-11 PROCEDURE — 83550 IRON BINDING TEST: CPT

## 2023-11-11 PROCEDURE — 83540 ASSAY OF IRON: CPT

## 2023-11-11 PROCEDURE — 80053 COMPREHEN METABOLIC PANEL: CPT

## 2023-11-11 PROCEDURE — 36415 COLL VENOUS BLD VENIPUNCTURE: CPT

## 2023-11-15 ENCOUNTER — RA CDI HCC (OUTPATIENT)
Dept: OTHER | Facility: HOSPITAL | Age: 73
End: 2023-11-15

## 2023-11-15 NOTE — PROGRESS NOTES
[address] [facility] coding opportunities       Chart reviewed, no opportunity found: [address]        Patients Insurance     Medicare Insurance: [employer]

## 2023-11-18 PROBLEM — N17.9 AKI (ACUTE KIDNEY INJURY) (HCC): Status: RESOLVED | Noted: 2023-03-09 | Resolved: 2023-11-18

## 2023-11-18 PROBLEM — E78.5 DYSLIPIDEMIA: Status: RESOLVED | Noted: 2018-07-03 | Resolved: 2023-11-18

## 2023-11-18 PROBLEM — N18.31 STAGE 3A CHRONIC KIDNEY DISEASE (HCC): Status: ACTIVE | Noted: 2023-11-18

## 2023-11-18 PROBLEM — Z48.89 ENCOUNTER FOR POSTOPERATIVE CARE: Status: RESOLVED | Noted: 2023-04-05 | Resolved: 2023-11-18

## 2023-11-18 PROBLEM — D50.9 ANEMIA, IRON DEFICIENCY: Status: RESOLVED | Noted: 2023-05-27 | Resolved: 2023-11-18

## 2023-11-18 PROBLEM — D69.6 THROMBOCYTOPENIC (HCC): Status: RESOLVED | Noted: 2023-03-09 | Resolved: 2023-11-18

## 2023-11-21 ENCOUNTER — OFFICE VISIT (OUTPATIENT)
Dept: FAMILY MEDICINE CLINIC | Facility: CLINIC | Age: 73
End: 2023-11-21
Payer: COMMERCIAL

## 2023-11-21 VITALS
WEIGHT: 220.2 LBS | HEIGHT: 66 IN | TEMPERATURE: 98.7 F | HEART RATE: 52 BPM | SYSTOLIC BLOOD PRESSURE: 118 MMHG | DIASTOLIC BLOOD PRESSURE: 74 MMHG | BODY MASS INDEX: 35.39 KG/M2 | RESPIRATION RATE: 18 BRPM | OXYGEN SATURATION: 99 %

## 2023-11-21 DIAGNOSIS — N18.31 STAGE 3A CHRONIC KIDNEY DISEASE (HCC): ICD-10-CM

## 2023-11-21 DIAGNOSIS — E78.5 DYSLIPIDEMIA: ICD-10-CM

## 2023-11-21 DIAGNOSIS — R73.01 IMPAIRED FASTING GLUCOSE: ICD-10-CM

## 2023-11-21 DIAGNOSIS — Z95.2 S/P AVR (AORTIC VALVE REPLACEMENT): ICD-10-CM

## 2023-11-21 DIAGNOSIS — Z00.00 MEDICARE ANNUAL WELLNESS VISIT, SUBSEQUENT: ICD-10-CM

## 2023-11-21 DIAGNOSIS — Z95.1 S/P CABG X 1: ICD-10-CM

## 2023-11-21 DIAGNOSIS — I25.10 CORONARY ARTERY DISEASE INVOLVING NATIVE CORONARY ARTERY OF NATIVE HEART WITHOUT ANGINA PECTORIS: Primary | ICD-10-CM

## 2023-11-21 DIAGNOSIS — Z23 ENCOUNTER FOR IMMUNIZATION: ICD-10-CM

## 2023-11-21 DIAGNOSIS — R97.20 ELEVATED PSA MEASUREMENT: ICD-10-CM

## 2023-11-21 PROCEDURE — G0009 ADMIN PNEUMOCOCCAL VACCINE: HCPCS

## 2023-11-21 PROCEDURE — G0439 PPPS, SUBSEQ VISIT: HCPCS | Performed by: FAMILY MEDICINE

## 2023-11-21 PROCEDURE — 99214 OFFICE O/P EST MOD 30 MIN: CPT | Performed by: FAMILY MEDICINE

## 2023-11-21 PROCEDURE — 90677 PCV20 VACCINE IM: CPT

## 2023-11-21 RX ORDER — MELATONIN
1000 DAILY
COMMUNITY

## 2023-11-21 NOTE — PROGRESS NOTES
Chief Complaint   Patient presents with    Medicare Wellness Visit     Subsequent visit      Health Maintenance   Topic Date Due    COVID-19 Vaccine (4 - Moderna series) 06/04/2021    Influenza Vaccine (1) Never done    Medicare Annual Wellness Visit (AWV)  11/14/2023    BMI: Followup Plan  11/14/2023    Fall Risk  11/21/2024    Depression Screening  11/21/2024    BMI: Adult  11/21/2024    Colorectal Cancer Screening  06/30/2032    Hepatitis C Screening  Completed    Pneumococcal Vaccine: 65+ Years  Completed    HIB Vaccine  Aged Out    IPV Vaccine  Aged Out    Hepatitis A Vaccine  Aged Out    Meningococcal ACWY Vaccine  Aged Out    HPV Vaccine  Aged Out      Assessment and Plan:     Problem List Items Addressed This Visit          Endocrine    Impaired fasting glucose     Recommended to avoid starchy foods, concentrated sweets. Encouraged weight reduction. Check Hemoglobin A1C in 6 months. Relevant Orders    Hemoglobin A1C    Comprehensive metabolic panel       Cardiovascular and Mediastinum    Coronary artery disease involving native coronary artery - Primary     S/P CABG x 1 in 3/2023. Continue Aspirin 81 mg daily, Rosuvastatin 20 mg daily, beta-blocker. Follow-up with cardiology Dr. Mahesh Jordan. Relevant Orders    Lipid panel       Genitourinary    Stage 3a chronic kidney disease ([address])     Lab Results   Component Value Date    EGFR 55 11/11/2023    EGFR 68 05/26/2023    EGFR 49 03/17/2023    CREATININE 1.29 11/11/2023    CREATININE 1.08 05/26/2023    CREATININE 1.41 (H) 03/17/2023   Recommended patient to stay well-hydrated. Avoid NSAIDs. Will continue to monitor labs. Other    Dyslipidemia     Continue Rosuvastatin 20 mg daily. Follow a low cholesterol diet, regular exercise. Relevant Orders    Lipid panel    Comprehensive metabolic panel    Elevated PSA measurement     PSA 5.44 -checked in June 2023. Patient was seen by urology CRNP in May 2023.     Urology recommended to schedule MRI of the prostate. Medicare annual wellness visit, subsequent    S/P CABG x 1    S/P AVR (aortic valve replacement)     Patient is S/P AVR in 3/2023. Doing well. No chest pain, shortness of breath, dizziness. Follow-up with cardiology Dr. Dottie Christensen. Other Visit Diagnoses       Encounter for immunization        Relevant Orders    Pneumococcal Conjugate Vaccine 20-valent (Pcv20) (Completed)          BMI Counseling: Body mass index is 35.54 kg/m². The BMI is above normal. Nutrition recommendations include decreasing portion sizes, encouraging healthy choices of fruits and vegetables, decreasing fast food intake, consuming healthier snacks, limiting drinks that contain sugar, moderation in carbohydrate intake, increasing intake of lean protein, reducing intake of saturated and trans fat and reducing intake of cholesterol. Exercise recommendations include moderate physical activity 150 minutes/week and exercising 3-5 times per week. Rationale for BMI follow-up plan is due to patient being overweight or obese. Preventive health issues were discussed with patient, and age appropriate screening tests were ordered as noted in patient's After Visit Summary. Personalized health advice and appropriate referrals for health education or preventive services given if needed, as noted in patient's After Visit Summary. Schedule follow-up visit in 6 months. Check labs prior to next visit. History of Present Illness:     Patient presents for a Medicare Wellness Visit    HPI     Patient presents for follow-up visit, medicare AWV. PMHx: CAD, severe AS, S/P AVR, CABG x 1 in 3/2023,   Dyslipidemia, Obesity, IFG, elevated PSA, ED. Reviewed current medications, blood test results from 11/11/23. Hemoglobin 15.2, iron 96, ferritin 30, fasting blood sugar 123, potassium 4.7, creatinine 1.29, GFR 55. States feels well, goes to the gym 3 times per week.   Reports no chest pain, shortness of breath, dizziness, heart palpitations. CAD - currently taking Rosuvastatin 20 mg daily, aspirin 81 mg daily and Metoprolol ER 25 mg 1 tab. every other day. Patient states that when was taking Metoprolol daily he was feeling cold. Followed by cardiology Dr. Charlie Bradford. Elevated PSA - last blood test done in June 2023 showed PSA of 5.44. Declined prostate biopsy in the past.  Urology recommended to schedule MRI of the prostate. Colonoscopy performed in 6/2022 by colorectal surgeon Dr. Fabian Alejo who recommended to repeat colonoscopy in 10 years. Family history is positive for diabetes in mother. Father had MI. Reports no family history of colon cancer or prostate cancer. Declined flu vaccination. Patient Care Team:  Carlos Shaikh MD as PCP - General     Review of Systems:     Review of Systems   Constitutional:  Negative for activity change, appetite change, chills, fatigue and fever. HENT:  Negative for congestion, ear pain, hearing loss, nosebleeds, sore throat and trouble swallowing. Eyes:  Negative for pain, discharge, redness, itching and visual disturbance. Respiratory:  Negative for cough, chest tightness, shortness of breath and wheezing. Cardiovascular:  Negative for chest pain, palpitations and leg swelling. Gastrointestinal:  Negative for abdominal pain, blood in stool, constipation, diarrhea, nausea and vomiting. Genitourinary:  Negative for difficulty urinating, dysuria and hematuria. Nocturia x 1   Musculoskeletal:  Negative for arthralgias, back pain, gait problem and joint swelling. Skin:  Negative for rash. Neurological:  Negative for dizziness, syncope and headaches. Hematological: Negative. Psychiatric/Behavioral:  Negative for dysphoric mood and sleep disturbance. The patient is not nervous/anxious.          Problem List:     Patient Active Problem List   Diagnosis    ED (erectile dysfunction)    Impaired fasting glucose    Obesity    Dyslipidemia    Elevated PSA measurement    Critical aortic valve stenosis    Medicare annual wellness visit, subsequent    Coronary artery disease involving native coronary artery    S/P CABG x 1    S/P AVR (aortic valve replacement)    Acute blood loss as cause of postoperative anemia    Stage 3a chronic kidney disease (HCC)      Past Medical and Surgical History:     Past Medical History:   Diagnosis Date    Asthma     Colon, diverticulosis     Nonspecific reaction to 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 active tuberculosis     CXR HAS BEEN CLEAR. Pneumonia      Past Surgical History:   Procedure Laterality Date    CARDIAC CATHETERIZATION  02/17/2023    Procedure: Cardiac catheterization;  Surgeon: Sla Jennings DO;  Location: [facility]; Service: Cardiology    CARDIAC CATHETERIZATION N/A 02/17/2023    Procedure: Cardiac Coronary Angiogram;  Surgeon: Sal Jennings DO;  Location: [facility]; Service: Cardiology    COLONOSCOPY  11/2007    COMPLETE; DONE 11/2007 WITH NO POLYPS, +DIVERTICULA.  2/14    COLONOSCOPY  06/30/2022    NC RPLCMT AORTIC VALVE OPN W/STENTLESS TISSUE VALVE N/A 03/06/2023    Procedure: AVR WITH 25MM INSPIRIS VALVE/ CABG X 1, LIMA to LAD;  Surgeon: Khadijah Foster DO;  Location: [facility];  Service: Cardiac Surgery    TONSILLECTOMY      LAST ASSESSED: 7/9/14      Family History:     Family History   Problem Relation Age of Onset    Diabetes Mother         MELLITUS    Coronary artery disease Mother     Coronary artery disease Father     Stroke Father         SYNDROME      Social History:     Social History     Socioeconomic History    Marital status: /Civil Union     Spouse name: None    Number of children: None    Years of education: None    Highest education level: None   Occupational History    None   Tobacco Use    Smoking status: Never     Passive exposure: Never    Smokeless tobacco: Never   Substance and Sexual Activity    Alcohol use: No    Drug use: No    Sexual activity: None   Other Topics Concern    None   Social History Narrative    None     Social Determinants of Health     Financial Resource Strain: Low Risk  (11/14/2023)    Overall Financial Resource Strain (CARDIA)     Difficulty of Paying Living Expenses: Not hard at all   Food Insecurity: No Food Insecurity (3/7/2023)    Hunger Vital Sign     Worried About Running Out of Food in the Last Year: Never true     Ran Out of Food in the Last Year: Never true   Transportation Needs: No Transportation Needs (11/14/2023)    PRAPARE - Transportation     Lack of Transportation (Medical): No     Lack of Transportation (Non-Medical): No   Physical Activity: Not on file   Stress: Not on file   Social Connections: Not on file   Intimate Partner Violence: Not on file   Housing Stability: Low Risk  (3/7/2023)    Housing Stability Vital Sign     Unable to Pay for Housing in the Last Year: No     Number of Places Lived in the Last Year: 1     Unstable Housing in the Last Year: No      Medications and Allergies:     Current Outpatient Medications   Medication Sig Dispense Refill    aspirin (ECOTRIN LOW STRENGTH) 81 mg EC tablet Take 1 tablet (81 mg total) by mouth daily 90 tablet 3    cholecalciferol (VITAMIN D3) 1,000 units tablet Take 1,000 Units by mouth daily      ferrous sulfate 325 (65 Fe) mg tablet Take 1 tablet (325 mg total) by mouth daily with breakfast Do not start before March 11, 2023. 90 tablet 0    metoprolol succinate (TOPROL-XL) 25 mg 24 hr tablet Take 0.5 tablets (12.5 mg total) by mouth daily (Patient taking differently: Take 25 mg by mouth every other day) 45 tablet 3    rosuvastatin (CRESTOR) 20 MG tablet Take 1 tablet (20 mg total) by mouth daily 90 tablet 3     No current facility-administered medications for this visit.      Allergies   Allergen Reactions    Eggs Or Egg-Derived Products - Food Allergy Hives    Fish-Derived Products - Food Allergy Hives    Tuberculin, Ppd Rash Immunizations:     Immunization History   Administered Date(s) Administered    COVID-19 MODERNA VACC 0.5 ML IM 03/04/2021, 03/25/2021, 04/09/2021    Pneumococcal Conjugate 13-Valent 07/03/2018    Pneumococcal Conjugate Vaccine 20-valent (Pcv20), Polysace 11/21/2023    Pneumococcal Polysaccharide PPV23 10/27/2021    Tdap 01/29/2008, 07/03/2018      Health Maintenance:         Topic Date Due    Colorectal Cancer Screening  06/30/2032    Hepatitis C Screening  Completed         Topic Date Due    COVID-19 Vaccine (4 - Moderna series) 06/04/2021    Influenza Vaccine (1) Never done      Medicare Screening Tests and Risk Assessments:     Fairy Bumpers is here for his Subsequent Wellness visit. Health Risk Assessment:   Patient rates overall health as very good. Patient feels that their physical health rating is much better. Patient is very satisfied with their life. Eyesight was rated as same. Hearing was rated as same. Patient feels that their emotional and mental health rating is same. Patients states they are never, rarely angry. Patient states they are never, rarely unusually tired/fatigued. Pain experienced in the last 7 days has been none. Patient states that he has experienced no weight loss or gain in last 6 months. Depression Screening:   PHQ-2 Score: 0      Fall Risk Screening: In the past year, patient has experienced: no history of falling in past year      Home Safety:  Patient does not have trouble with stairs inside or outside of their home. Patient has working smoke alarms and has no working carbon monoxide detector. Home safety hazards include: none. Nutrition:   Current diet is Regular. Medications:   Patient is not currently taking any over-the-counter supplements. Patient is able to manage medications.      Activities of Daily Living (ADLs)/Instrumental Activities of Daily Living (IADLs):   Walk and transfer into and out of bed and chair?: Yes  Dress and groom yourself?: Yes    Bathe or shower yourself?: Yes    Feed yourself? Yes  Do your laundry/housekeeping?: Yes  Manage your money, pay your bills and track your expenses?: Yes  Make your own meals?: Yes    Do your own shopping?: Yes    Previous Hospitalizations:   Any hospitalizations or ED visits within the last 12 months?: Yes    How many hospitalizations have you had in the last year?: 1-2    Hospitalization Comments: Heart Surgery 2023    Advance Care Planning:   Living will: Yes    Durable POA for healthcare: Yes    Advanced directive: Yes    Advanced directive counseling given: Yes      Cognitive Screening:   Provider or family/friend/caregiver concerned regarding cognition?: No    PREVENTIVE SCREENINGS      Cardiovascular Screening:    General: Screening Not Indicated and History Lipid Disorder      Diabetes Screening:     General: Screening Current      Colorectal Cancer Screening:     General: Screening Current      Prostate Cancer Screening:    General: Screening Current      Osteoporosis Screening:    General: Risks and Benefits Discussed      Abdominal Aortic Aneurysm (AAA) Screening:    Risk factors include: age between 70-75 yo        General: Screening Not Indicated      Lung Cancer Screening:     General: Screening Not Indicated      Hepatitis C Screening:    General: Screening Current    Screening, Brief Intervention, and Referral to Treatment (SBIRT)    Screening  Typical number of drinks in a day: 0  Typical number of drinks in a week: 0  Interpretation: Low risk drinking behavior.     AUDIT-C Screenin) How often did you have a drink containing alcohol in the past year? monthly or less  2) How many drinks did you have on a typical day when you were drinking in the past year? 0  3) How often did you have 6 or more drinks on one occasion in the past year? never    AUDIT-C Score: 1  Interpretation: Score 0-3 (male): Negative screen for alcohol misuse    Single Item Drug Screening:  How often have you used an illegal drug (including marijuana) or a prescription medication for non-medical reasons in the past year? never    Single Item Drug Screen Score: 0  Interpretation: Negative screen for possible drug use disorder    Other Counseling Topics:   Car/seat belt/driving safety, skin self-exam and calcium and vitamin D intake and regular weightbearing exercise. No results found. Physical Exam:     /74   Pulse (!) 52   Temp 98.7 °F (37.1 °C) (Tympanic)   Resp 18   Ht 5' 6" (1.676 m)   Wt 99.9 kg (220 lb 3.2 oz)   SpO2 99%   BMI 35.54 kg/m²     Physical Exam  Vitals and nursing note reviewed. Constitutional:       Appearance: Normal appearance. HENT:      Head: Normocephalic and atraumatic. Eyes:      Conjunctiva/sclera: Conjunctivae normal.      Pupils: Pupils are equal, round, and reactive to light. Neck:      Vascular: No carotid bruit. Cardiovascular:      Rate and Rhythm: Regular rhythm. Bradycardia present. Heart sounds: No murmur heard. Pulmonary:      Effort: Pulmonary effort is normal.      Breath sounds: Normal breath sounds. Abdominal:      General: Bowel sounds are normal. There is no distension. Palpations: Abdomen is soft. Tenderness: There is no abdominal tenderness. Musculoskeletal:         General: No swelling. Normal range of motion. Cervical back: Normal range of motion and neck supple. Right lower leg: No edema. Left lower leg: No edema. Skin:     General: Skin is warm and dry. Findings: No rash. Neurological:      General: No focal deficit present. Mental Status: He is alert and oriented to person, place, and time. Motor: No weakness.       Gait: Gait normal.   Psychiatric:         Mood and Affect: Mood normal.          Le Melton MD

## 2023-11-21 NOTE — ASSESSMENT & PLAN NOTE
Patient is S/P AVR in 3/2023. Doing well. No chest pain, shortness of breath, dizziness. Follow-up with cardiology Dr. Edel Stephenson.

## 2023-11-21 NOTE — ASSESSMENT & PLAN NOTE
Recommended to avoid starchy foods, concentrated sweets. Encouraged weight reduction. Check Hemoglobin A1C in 6 months.

## 2023-11-21 NOTE — ASSESSMENT & PLAN NOTE
S/P CABG x 1 in 3/2023. Continue Aspirin 81 mg daily, Rosuvastatin 20 mg daily, beta-blocker. Follow-up with cardiology Dr. Jake Salazar.

## 2023-11-21 NOTE — ASSESSMENT & PLAN NOTE
PSA 5.44 -checked in June 2023. Patient was seen by urology CRNP in May 2023. Urology recommended to schedule MRI of the prostate.

## 2023-11-21 NOTE — PATIENT INSTRUCTIONS
Medicare Preventive Visit Patient Instructions  Thank you for completing your Welcome to Medicare Visit or Medicare Annual Wellness Visit today. Your next wellness visit will be due in one year (11/21/2024). The screening/preventive services that you may require over the next 5-10 years are detailed below. Some tests may not apply to you based off risk factors and/or age. Screening tests ordered at today's visit but not completed yet may show as past due. Also, please note that scanned in results may not display below. Preventive Screenings:  Service Recommendations Previous Testing/Comments   Colorectal Cancer Screening  Colonoscopy    Fecal Occult Blood Test (FOBT)/Fecal Immunochemical Test (FIT)  Fecal DNA/Cologuard Test  Flexible Sigmoidoscopy Age: 43-73 years old   Colonoscopy: every 10 years (May be performed more frequently if at higher risk)  OR  FOBT/FIT: every 1 year  OR  Cologuard: every 3 years  OR  Sigmoidoscopy: every 5 years  Screening may be recommended earlier than age 39 if at higher risk for colorectal cancer. Also, an individualized decision between you and your healthcare provider will decide whether screening between the ages of 77-80 would be appropriate. Colonoscopy: 06/30/2022  FOBT/FIT: Not on file  Cologuard: Not on file  Sigmoidoscopy: Not on file          Prostate Cancer Screening Individualized decision between patient and health care provider in men between ages of 53-66   Medicare will cover every 12 months beginning on the day after your 50th birthday PSA: 5.44 ng/mL           Hepatitis C Screening Once for adults born between 1945 and 1965  More frequently in patients at high risk for Hepatitis C Hep C Antibody: 11/29/2022        Diabetes Screening 1-2 times per year if you're at risk for diabetes or have pre-diabetes Fasting glucose: 123 mg/dL (11/11/2023)  A1C: 5.4 % (2/25/2023)      Cholesterol Screening Once every 5 years if you don't have a lipid disorder.  May order more often based on risk factors. Lipid panel: 02/25/2023         Other Preventive Screenings Covered by Medicare:  Abdominal Aortic Aneurysm (AAA) Screening: covered once if your at risk. You're considered to be at risk if you have a family history of AAA or a male between the age of 70-76 who smoking at least 100 cigarettes in your lifetime. Lung Cancer Screening: covers low dose CT scan once per year if you meet all of the following conditions: (1) Age 48-67; (2) No signs or symptoms of lung cancer; (3) Current smoker or have quit smoking within the last 15 years; (4) You have a tobacco smoking history of at least 20 pack years (packs per day x number of years you smoked); (5) You get a written order from a healthcare provider. Glaucoma Screening: covered annually if you're considered high risk: (1) You have diabetes OR (2) Family history of glaucoma OR (3)  aged 48 and older OR (3)  American aged 72 and older  Osteoporosis Screening: covered every 2 years if you meet one of the following conditions: (1) Have a vertebral abnormality; (2) On glucocorticoid therapy for more than 3 months; (3) Have primary hyperparathyroidism; (4) On osteoporosis medications and need to assess response to drug therapy. HIV Screening: covered annually if you're between the age of 14-79. Also covered annually if you are younger than 13 and older than 72 with risk factors for HIV infection. For pregnant patients, it is covered up to 3 times per pregnancy.     Immunizations:  Immunization Recommendations   Influenza Vaccine Annual influenza vaccination during flu season is recommended for all persons aged >= 6 months who do not have contraindications   Pneumococcal Vaccine   * Pneumococcal conjugate vaccine = PCV13 (Prevnar 13), PCV15 (Vaxneuvance), PCV20 (Prevnar 20)  * Pneumococcal polysaccharide vaccine = PPSV23 (Pneumovax) Adults 82-79 yo with certain risk factors or if 69+ yo  If never received any pneumonia vaccine: recommend Prevnar 21 (PCV20)  Give PCV20 if previously received 1 dose of PCV13 or PPSV23   Hepatitis B Vaccine 3 dose series if at intermediate or high risk (ex: diabetes, end stage renal disease, liver disease)   Respiratory syncytial virus (RSV) Vaccine - COVERED BY MEDICARE PART D  * RSVPreF3 (Arexvy) CDC recommends that adults 61years of age and older may receive a single dose of RSV vaccine using shared clinical decision-making (SCDM)   Tetanus (Td) Vaccine - COST NOT COVERED BY MEDICARE PART B Following completion of primary series, a booster dose should be given every 10 years to maintain immunity against tetanus. Td may also be given as tetanus wound prophylaxis. Tdap Vaccine - COST NOT COVERED BY MEDICARE PART B Recommended at least once for all adults. For pregnant patients, recommended with each pregnancy. Shingles Vaccine (Shingrix) - COST NOT COVERED BY MEDICARE PART B  2 shot series recommended in those 19 years and older who have or will have weakened immune systems or those 50 years and older     Health Maintenance Due:      Topic Date Due   • Colorectal Cancer Screening  06/30/2032   • Hepatitis C Screening  Completed     Immunizations Due:      Topic Date Due   • COVID-19 Vaccine (4 - Moderna series) 06/04/2021   • Influenza Vaccine (1) Never done     Advance Directives   What are advance directives? Advance directives are legal documents that state your wishes and plans for medical care. These plans are made ahead of time in case you lose your ability to make decisions for yourself. Advance directives can apply to any medical decision, such as the treatments you want, and if you want to donate organs. What are the types of advance directives? There are many types of advance directives, and each state has rules about how to use them. You may choose a combination of any of the following:  Living will: This is a written record of the treatment you want.  You can also choose which treatments you do not want, which to limit, and which to stop at a certain time. This includes surgery, medicine, IV fluid, and tube feedings. Durable power of  for [facility]): This is a written record that states who you want to make healthcare choices for you when you are unable to make them for yourself. This person, called a proxy, is usually a family member or a friend. You may choose more than 1 proxy. Do not resuscitate (DNR) order:  A DNR order is used in case your heart stops beating or you stop breathing. It is a request not to have certain forms of treatment, such as CPR. A DNR order may be included in other types of advance directives. Medical directive: This covers the care that you want if you are in a coma, near death, or unable to make decisions for yourself. You can list the treatments you want for each condition. Treatment may include pain medicine, surgery, blood transfusions, dialysis, IV or tube feedings, and a ventilator (breathing machine). Values history: This document has questions about your views, beliefs, and how you feel and think about life. This information can help others choose the care that you would choose. Why are advance directives important? An advance directive helps you control your care. Although spoken wishes may be used, it is better to have your wishes written down. Spoken wishes can be misunderstood, or not followed. Treatments may be given even if you do not want them. An advance directive may make it easier for your family to make difficult choices about your care. Weight Management   Why it is important to manage your weight:  Being overweight increases your risk of health conditions such as heart disease, high blood pressure, type 2 diabetes, and certain types of cancer. It can also increase your risk for osteoarthritis, sleep apnea, and other respiratory problems. Aim for a slow, steady weight loss.  Even a small amount of weight loss can lower your risk of health problems. How to lose weight safely:  A safe and healthy way to lose weight is to eat fewer calories and get regular exercise. You can lose up about 1 pound a week by decreasing the number of calories you eat by 500 calories each day. Healthy meal plan for weight management:  A healthy meal plan includes a variety of foods, contains fewer calories, and helps you stay healthy. A healthy meal plan includes the following:  Eat whole-grain foods more often. A healthy meal plan should contain fiber. Fiber is the part of grains, fruits, and vegetables that is not broken down by your body. Whole-grain foods are healthy and provide extra fiber in your diet. Some examples of whole-grain foods are whole-wheat breads and pastas, oatmeal, brown rice, and bulgur. Eat a variety of vegetables every day. Include dark, leafy greens such as spinach, kale, jose angel greens, and mustard greens. Eat yellow and orange vegetables such as carrots, sweet potatoes, and winter squash. Eat a variety of fruits every day. Choose fresh or canned fruit (canned in its own juice or light syrup) instead of juice. Fruit juice has very little or no fiber. Eat low-fat dairy foods. Drink fat-free (skim) milk or 1% milk. Eat fat-free yogurt and low-fat cottage cheese. Try low-fat cheeses such as mozzarella and other reduced-fat cheeses. Choose meat and other protein foods that are low in fat. Choose beans or other legumes such as split peas or lentils. Choose fish, skinless poultry (chicken or turkey), or lean cuts of red meat (beef or pork). Before you cook meat or poultry, cut off any visible fat. Use less fat and oil. Try baking foods instead of frying them. Add less fat, such as margarine, sour cream, regular salad dressing and mayonnaise to foods. Eat fewer high-fat foods. Some examples of high-fat foods include french fries, doughnuts, ice cream, and cakes. Eat fewer sweets.   Limit foods and drinks that are high in sugar. This includes candy, cookies, regular soda, and sweetened drinks. Exercise:  Exercise at least 30 minutes per day on most days of the week. Some examples of exercise include walking, biking, dancing, and swimming. You can also fit in more physical activity by taking the stairs instead of the elevator or parking farther away from stores. Ask your healthcare provider about the best exercise plan for you. © Copyright [employer] 2018 Information is for End User's use only and may not be sold, redistributed or otherwise used for commercial purposes.  All illustrations and images included in CareNotes® are the copyrighted property of [employer].A[employer]M., Inc. or  [employer]

## 2023-11-21 NOTE — ASSESSMENT & PLAN NOTE
Lab Results   Component Value Date    EGFR 55 11/11/2023    EGFR 68 05/26/2023    EGFR 49 03/17/2023    CREATININE 1.29 11/11/2023    CREATININE 1.08 05/26/2023    CREATININE 1.41 (H) 03/17/2023     Recommended patient to stay well-hydrated. Avoid NSAIDs. Will continue to monitor labs.

## 2023-12-27 ENCOUNTER — TELEPHONE (OUTPATIENT)
Age: 73
End: 2023-12-27

## 2023-12-27 DIAGNOSIS — R97.20 ELEVATED PSA: Primary | ICD-10-CM

## 2023-12-27 NOTE — TELEPHONE ENCOUNTER
Last office visit in September was cancelled and there was no PSA ordered prior to that visit.  Please advise, would you like orders placed for repeat PSA prior to follow up? As per last office visit in May, MRI prostate was recommended, but client decision to repeat PSA instead.

## 2023-12-27 NOTE — TELEPHONE ENCOUNTER
PSA was ordered for I'm to do now - and he is tentatively scheduled for f/u for 6/12 with Norbert - depending on results may need to schedule sooner.  Also reminded of no sex 4 days prior -  heavy or riding a bike prior to have PSA

## 2023-12-27 NOTE — TELEPHONE ENCOUNTER
----- Message from Massimo Burnett sent at 12/27/2023  8:00 AM EST -----  Regarding: Schedule blood work and appointment   Contact: [phone]  Good morning,    Please schedule an appointment and corresponding PSA blood work after January 2024 in the AM.      Thanks  Massimo Burnett  [phone]

## 2023-12-27 NOTE — TELEPHONE ENCOUNTER
Patient returned missed call. I relayed the information noted above.    Pt verbalized understanding.

## 2024-01-17 PROBLEM — Z00.00 MEDICARE ANNUAL WELLNESS VISIT, SUBSEQUENT: Status: RESOLVED | Noted: 2021-10-25 | Resolved: 2024-01-17

## 2024-01-20 ENCOUNTER — APPOINTMENT (OUTPATIENT)
Dept: LAB | Facility: CLINIC | Age: 74
End: 2024-01-20
Payer: COMMERCIAL

## 2024-01-20 DIAGNOSIS — R97.20 ELEVATED PSA: ICD-10-CM

## 2024-01-20 PROCEDURE — 84154 ASSAY OF PSA FREE: CPT

## 2024-01-20 PROCEDURE — 36415 COLL VENOUS BLD VENIPUNCTURE: CPT

## 2024-01-20 PROCEDURE — 84153 ASSAY OF PSA TOTAL: CPT

## 2024-01-21 LAB
PSA FREE MFR SERPL: 20.1 %
PSA FREE SERPL-MCNC: 1.45 NG/ML
PSA SERPL-MCNC: 7.2 NG/ML (ref 0–4)

## 2024-01-22 ENCOUNTER — TELEPHONE (OUTPATIENT)
Dept: UROLOGY | Facility: AMBULATORY SURGERY CENTER | Age: 74
End: 2024-01-22

## 2024-01-22 NOTE — TELEPHONE ENCOUNTER
Called and spoke to patient to let him know that his PSA increased further to 7.2. And so our PA recommends a prostate MRI I stated to him the order for there MRI is already in the system. I also stated to patient when he makes that appt for his MRI to let us know so we can make an appt around that time or closer then his June appt to review his MRI results.          ----- Message from Danisha Newman PA-C sent at 1/22/2024  8:07 AM EST -----  PSA appears to have increased further to 7.2. Recommend prostate MRI as previously advised/ordered and arranging follow up to review.

## 2024-01-31 ENCOUNTER — HOSPITAL ENCOUNTER (OUTPATIENT)
Dept: RADIOLOGY | Age: 74
Discharge: HOME | End: 2024-01-31
Payer: COMMERCIAL

## 2024-01-31 DIAGNOSIS — R97.20 ELEVATED PSA: ICD-10-CM

## 2024-01-31 PROCEDURE — A9585 GADOBUTROL INJECTION: HCPCS | Performed by: NURSE PRACTITIONER

## 2024-01-31 PROCEDURE — 76377 3D RENDER W/INTRP POSTPROCES: CPT

## 2024-01-31 PROCEDURE — 72197 MRI PELVIS W/O & W/DYE: CPT

## 2024-01-31 PROCEDURE — G1004 CDSM NDSC: HCPCS

## 2024-01-31 RX ORDER — GADOBUTROL 604.72 MG/ML
10 INJECTION INTRAVENOUS
Status: COMPLETED | OUTPATIENT
Start: 2024-01-31 | End: 2024-01-31

## 2024-01-31 RX ADMIN — GADOBUTROL 10 ML: 604.72 INJECTION INTRAVENOUS at 08:28

## 2024-02-05 ENCOUNTER — TELEPHONE (OUTPATIENT)
Age: 74
End: 2024-02-05

## 2024-02-05 NOTE — TELEPHONE ENCOUNTER
Pt is under the care of: Norbert     Pt was last seen: 05/30/23    Pt had test done on: 01/31/24    Pt would like to know test results     Pt can be reached at: [phone]

## 2024-02-06 ENCOUNTER — HOSPITAL ENCOUNTER (OUTPATIENT)
Dept: NON INVASIVE DIAGNOSTICS | Facility: CLINIC | Age: 74
Discharge: HOME | End: 2024-02-06
Payer: COMMERCIAL

## 2024-02-06 VITALS
WEIGHT: 220.24 LBS | HEART RATE: 65 BPM | DIASTOLIC BLOOD PRESSURE: 74 MMHG | BODY MASS INDEX: 35.4 KG/M2 | HEIGHT: 66 IN | SYSTOLIC BLOOD PRESSURE: 118 MMHG

## 2024-02-06 DIAGNOSIS — Z95.2 S/P AVR (AORTIC VALVE REPLACEMENT): ICD-10-CM

## 2024-02-06 DIAGNOSIS — I25.10 CORONARY ARTERY DISEASE INVOLVING NATIVE CORONARY ARTERY OF NATIVE HEART WITHOUT ANGINA PECTORIS: ICD-10-CM

## 2024-02-06 LAB
AORTIC ROOT: 3.3 CM
AORTIC VALVE MEAN VELOCITY: 15.9 M/S
APICAL FOUR CHAMBER EJECTION FRACTION: 63 %
ASCENDING AORTA: 3.5 CM
AV LVOT MEAN GRADIENT: 2 MMHG
AV LVOT PEAK GRADIENT: 3 MMHG
AV MEAN GRADIENT: 11 MMHG
AV PEAK GRADIENT: 20 MMHG
AV VELOCITY RATIO: 0.42
BSA FOR ECHO PROCEDURE: 2.08 M2
DOP CALC AO PEAK VEL: 2.21 M/S
DOP CALC AO VTI: 44.53 CM
DOP CALC LVOT PEAK VEL VTI: 23.16 CM
DOP CALC LVOT PEAK VEL: 0.92 M/S
E WAVE DECELERATION TIME: 228 MS
E/A RATIO: 0.99
FRACTIONAL SHORTENING: 38 (ref 28–44)
INTERVENTRICULAR SEPTUM IN DIASTOLE (PARASTERNAL SHORT AXIS VIEW): 0.7 CM
INTERVENTRICULAR SEPTUM: 0.7 CM (ref 0.6–1.1)
LAAS-AP2: 17.7 CM2
LAAS-AP4: 19.3 CM2
LEFT ATRIUM AREA SYSTOLE SINGLE PLANE A4C: 21.8 CM2
LEFT ATRIUM SIZE: 4.2 CM
LEFT ATRIUM VOLUME (MOD BIPLANE): 46 ML
LEFT ATRIUM VOLUME INDEX (MOD BIPLANE): 22.1 ML/M2
LEFT INTERNAL DIMENSION IN SYSTOLE: 3 CM (ref 2.1–4)
LEFT VENTRICULAR INTERNAL DIMENSION IN DIASTOLE: 4.8 CM (ref 3.5–6)
LEFT VENTRICULAR POSTERIOR WALL IN END DIASTOLE: 0.7 CM
LEFT VENTRICULAR STROKE VOLUME: 72 ML
LVSV (TEICH): 72 ML
MV E'TISSUE VEL-SEP: 8 CM/S
MV PEAK A VEL: 0.81 M/S
MV PEAK E VEL: 80 CM/S
MV STENOSIS PRESSURE HALF TIME: 66 MS
MV VALVE AREA P 1/2 METHOD: 3.33
RIGHT ATRIUM AREA SYSTOLE A4C: 13 CM2
RIGHT VENTRICLE ID DIMENSION: 3.1 CM
SL CV LEFT ATRIUM LENGTH A2C: 5.9 CM
SL CV LV EF: 65
SL CV PED ECHO LEFT VENTRICLE DIASTOLIC VOLUME (MOD BIPLANE) 2D: 106 ML
SL CV PED ECHO LEFT VENTRICLE SYSTOLIC VOLUME (MOD BIPLANE) 2D: 34 ML
TRICUSPID ANNULAR PLANE SYSTOLIC EXCURSION: 1.9 CM

## 2024-02-06 PROCEDURE — 93306 TTE W/DOPPLER COMPLETE: CPT | Performed by: INTERNAL MEDICINE

## 2024-02-06 PROCEDURE — 93306 TTE W/DOPPLER COMPLETE: CPT

## 2024-02-06 NOTE — TELEPHONE ENCOUNTER
rising psa already reviewed. pt sent for MRI prostate for this reason. MRI was completd 1/31 and has not been finalized/read yet. await rads reports and will call him then

## 2024-02-09 ENCOUNTER — TELEPHONE (OUTPATIENT)
Dept: CARDIOLOGY CLINIC | Facility: CLINIC | Age: 74
End: 2024-02-09

## 2024-02-09 NOTE — TELEPHONE ENCOUNTER
Spoke with pt, advised pt of echo results per Dr. Lagunas. Pt verbally understood.     Pt is due for his 6 months f/u.      Scheduling dept  Please contact pt to help arrange a follow up visit. Thank you

## 2024-02-12 NOTE — TELEPHONE ENCOUNTER
Pt called agitated that he has not gotten call regarding MRI results I informed him they were finalized 2/9/24 and sent to provider for review he will be waiting for call.

## 2024-02-12 NOTE — TELEPHONE ENCOUNTER
i spoke with Massimo reviewed his MRI pirads 5 suspicious for a prostate cancer we went through all the fusion biopsy info risk benefit technique etc he wishes to proceed    [facility] is good for him    [place] for 2 weeks to see his daughter Izabela weekend then a month in [place] in May

## 2024-02-14 NOTE — TELEPHONE ENCOUNTER
I spoke to the patient and scheduled his procedure for 4/9/2024 at [facility] with Dr. Silva. (Pt declined sooner)    -instructions given verbally and mailed  -patient aware to be NPO, needs a  and use an enema 1 hour prior to leaving the house morning of procedure  -CBC, CMP, Urine C&S and EKG 2 weeks prior  -po/FT 4/19/2024

## 2024-05-06 ENCOUNTER — TELEPHONE (OUTPATIENT)
Age: 74
End: 2024-05-06

## 2024-05-06 NOTE — TELEPHONE ENCOUNTER
Massimo would like to get his blood work scripts and to make sure that the PSA level are also included.

## 2024-05-13 ENCOUNTER — TELEPHONE (OUTPATIENT)
Age: 74
End: 2024-05-13

## 2024-05-13 DIAGNOSIS — R97.20 ELEVATED PSA: Primary | ICD-10-CM

## 2024-05-13 NOTE — TELEPHONE ENCOUNTER
PSA orders are updated in Epic. He is scheduled with an AP on 6/12/2024. He would like to reschedule his MRI fusion of the prostate. Please advise if you want to reschedule now or wait to see what the PSA results reveal? Please advise on feedback for this appointment.

## 2024-05-13 NOTE — TELEPHONE ENCOUNTER
Pt calling for a new order for a PSA.    Pt also wants to reschedule his Biopsy that he was to have in April   Pt is going on vacation and needs to be done after June 15th.  Pt is leaving June 4th.      PT can be reached at [phone]

## 2024-05-14 NOTE — TELEPHONE ENCOUNTER
cancel 6/12 appt. MRi is already abnormal and plan stays the same he needs fusion biopsy in June then 2 week post bx path visit with MD lester

## 2024-05-14 NOTE — TELEPHONE ENCOUNTER
Called and spoke with patient to discuss CLAUDE Martinez's note. Patient aware June appointment with AP was cancelled and that plan remains the same for fusion biopsy. Patient wishes to schedule this after he returns to the area on 6/15. Will forward to Mary to schedule.

## 2024-05-15 ENCOUNTER — RA CDI HCC (OUTPATIENT)
Dept: OTHER | Facility: HOSPITAL | Age: 74
End: 2024-05-15

## 2024-05-17 ENCOUNTER — APPOINTMENT (OUTPATIENT)
Dept: LAB | Facility: CLINIC | Age: 74
End: 2024-05-17
Payer: COMMERCIAL

## 2024-05-17 DIAGNOSIS — R39.89 SUSPECTED UTI: ICD-10-CM

## 2024-05-17 DIAGNOSIS — R97.20 ELEVATED PROSTATE SPECIFIC ANTIGEN (PSA): ICD-10-CM

## 2024-05-17 DIAGNOSIS — R97.20 ELEVATED PSA: ICD-10-CM

## 2024-05-17 DIAGNOSIS — I25.10 CORONARY ARTERY DISEASE INVOLVING NATIVE CORONARY ARTERY OF NATIVE HEART WITHOUT ANGINA PECTORIS: ICD-10-CM

## 2024-05-17 DIAGNOSIS — R73.01 IMPAIRED FASTING GLUCOSE: ICD-10-CM

## 2024-05-17 DIAGNOSIS — E78.5 DYSLIPIDEMIA: ICD-10-CM

## 2024-05-17 DIAGNOSIS — E78.5 DYSLIPIDEMIA, GOAL LDL BELOW 70: ICD-10-CM

## 2024-05-17 LAB
ALBUMIN SERPL BCP-MCNC: 4.1 G/DL (ref 3.5–5)
ALP SERPL-CCNC: 65 U/L (ref 34–104)
ALT SERPL W P-5'-P-CCNC: 13 U/L (ref 7–52)
ANION GAP SERPL CALCULATED.3IONS-SCNC: 7 MMOL/L (ref 4–13)
AST SERPL W P-5'-P-CCNC: 13 U/L (ref 13–39)
BASOPHILS # BLD AUTO: 0.06 THOUSANDS/ÂΜL (ref 0–0.1)
BASOPHILS NFR BLD AUTO: 1 % (ref 0–1)
BILIRUB SERPL-MCNC: 0.54 MG/DL (ref 0.2–1)
BUN SERPL-MCNC: 21 MG/DL (ref 5–25)
CALCIUM SERPL-MCNC: 9 MG/DL (ref 8.4–10.2)
CHLORIDE SERPL-SCNC: 106 MMOL/L (ref 96–108)
CHOLEST SERPL-MCNC: 166 MG/DL
CO2 SERPL-SCNC: 26 MMOL/L (ref 21–32)
CREAT SERPL-MCNC: 1.17 MG/DL (ref 0.6–1.3)
EOSINOPHIL # BLD AUTO: 0.17 THOUSAND/ÂΜL (ref 0–0.61)
EOSINOPHIL NFR BLD AUTO: 2 % (ref 0–6)
ERYTHROCYTE [DISTWIDTH] IN BLOOD BY AUTOMATED COUNT: 12.4 % (ref 11.6–15.1)
EST. AVERAGE GLUCOSE BLD GHB EST-MCNC: 120 MG/DL
GFR SERPL CREATININE-BSD FRML MDRD: 61 ML/MIN/1.73SQ M
GLUCOSE P FAST SERPL-MCNC: 124 MG/DL (ref 65–99)
HBA1C MFR BLD: 5.8 %
HCT VFR BLD AUTO: 44.5 % (ref 36.5–49.3)
HDLC SERPL-MCNC: 46 MG/DL
HGB BLD-MCNC: 14.5 G/DL (ref 12–17)
IMM GRANULOCYTES # BLD AUTO: 0.01 THOUSAND/UL (ref 0–0.2)
IMM GRANULOCYTES NFR BLD AUTO: 0 % (ref 0–2)
LDLC SERPL CALC-MCNC: 110 MG/DL (ref 0–100)
LYMPHOCYTES # BLD AUTO: 2.64 THOUSANDS/ÂΜL (ref 0.6–4.47)
LYMPHOCYTES NFR BLD AUTO: 37 % (ref 14–44)
MCH RBC QN AUTO: 31.9 PG (ref 26.8–34.3)
MCHC RBC AUTO-ENTMCNC: 32.6 G/DL (ref 31.4–37.4)
MCV RBC AUTO: 98 FL (ref 82–98)
MONOCYTES # BLD AUTO: 0.69 THOUSAND/ÂΜL (ref 0.17–1.22)
MONOCYTES NFR BLD AUTO: 10 % (ref 4–12)
NEUTROPHILS # BLD AUTO: 3.49 THOUSANDS/ÂΜL (ref 1.85–7.62)
NEUTS SEG NFR BLD AUTO: 50 % (ref 43–75)
NRBC BLD AUTO-RTO: 0 /100 WBCS
PLATELET # BLD AUTO: 162 THOUSANDS/UL (ref 149–390)
PMV BLD AUTO: 10.8 FL (ref 8.9–12.7)
POTASSIUM SERPL-SCNC: 4 MMOL/L (ref 3.5–5.3)
PROT SERPL-MCNC: 7 G/DL (ref 6.4–8.4)
PSA SERPL-MCNC: 16.71 NG/ML (ref 0–4)
RBC # BLD AUTO: 4.54 MILLION/UL (ref 3.88–5.62)
SODIUM SERPL-SCNC: 139 MMOL/L (ref 135–147)
TRIGL SERPL-MCNC: 51 MG/DL
WBC # BLD AUTO: 7.06 THOUSAND/UL (ref 4.31–10.16)

## 2024-05-17 PROCEDURE — 87086 URINE CULTURE/COLONY COUNT: CPT

## 2024-05-17 PROCEDURE — 80061 LIPID PANEL: CPT

## 2024-05-17 PROCEDURE — 36415 COLL VENOUS BLD VENIPUNCTURE: CPT

## 2024-05-17 PROCEDURE — 85025 COMPLETE CBC W/AUTO DIFF WBC: CPT

## 2024-05-17 PROCEDURE — 84153 ASSAY OF PSA TOTAL: CPT

## 2024-05-17 PROCEDURE — 83036 HEMOGLOBIN GLYCOSYLATED A1C: CPT

## 2024-05-17 PROCEDURE — 80053 COMPREHEN METABOLIC PANEL: CPT

## 2024-05-18 PROBLEM — D62 ACUTE BLOOD LOSS AS CAUSE OF POSTOPERATIVE ANEMIA: Status: RESOLVED | Noted: 2023-03-09 | Resolved: 2024-05-18

## 2024-05-18 LAB — BACTERIA UR CULT: NORMAL

## 2024-05-20 ENCOUNTER — TELEPHONE (OUTPATIENT)
Dept: UROLOGY | Facility: AMBULATORY SURGERY CENTER | Age: 74
End: 2024-05-20

## 2024-05-20 NOTE — TELEPHONE ENCOUNTER
Can you please call Massimo and let him know that his PSA has since doubled since his last check.  I know he originally had a prostate biopsy scheduled for April but he wanted to put it off until after his vacation in June.  We strongly recommend that he set this up ASAP and undergo a prostate biopsy as he also has an abnormal MRI.  I do see that Mary Wray did try reaching out to the patient but I do not see a date scheduled for biopsy yet.

## 2024-05-21 ENCOUNTER — OFFICE VISIT (OUTPATIENT)
Dept: FAMILY MEDICINE CLINIC | Facility: CLINIC | Age: 74
End: 2024-05-21
Payer: COMMERCIAL

## 2024-05-21 VITALS
BODY MASS INDEX: 34.62 KG/M2 | OXYGEN SATURATION: 98 % | RESPIRATION RATE: 18 BRPM | DIASTOLIC BLOOD PRESSURE: 64 MMHG | SYSTOLIC BLOOD PRESSURE: 124 MMHG | TEMPERATURE: 98.3 F | WEIGHT: 215.4 LBS | HEIGHT: 66 IN | HEART RATE: 53 BPM

## 2024-05-21 DIAGNOSIS — R73.01 IMPAIRED FASTING GLUCOSE: ICD-10-CM

## 2024-05-21 DIAGNOSIS — M79.671 PAIN OF RIGHT HEEL: ICD-10-CM

## 2024-05-21 DIAGNOSIS — Z95.2 S/P AVR (AORTIC VALVE REPLACEMENT): ICD-10-CM

## 2024-05-21 DIAGNOSIS — I25.10 CORONARY ARTERY DISEASE INVOLVING NATIVE CORONARY ARTERY OF NATIVE HEART WITHOUT ANGINA PECTORIS: Primary | ICD-10-CM

## 2024-05-21 DIAGNOSIS — E78.5 DYSLIPIDEMIA: ICD-10-CM

## 2024-05-21 DIAGNOSIS — N18.31 STAGE 3A CHRONIC KIDNEY DISEASE (HCC): ICD-10-CM

## 2024-05-21 DIAGNOSIS — E66.09 CLASS 2 OBESITY DUE TO EXCESS CALORIES WITHOUT SERIOUS COMORBIDITY WITH BODY MASS INDEX (BMI) OF 35.0 TO 35.9 IN ADULT: ICD-10-CM

## 2024-05-21 DIAGNOSIS — R97.20 ELEVATED PSA MEASUREMENT: ICD-10-CM

## 2024-05-21 PROBLEM — J95.1 ACUTE PULMONARY INSUFFICIENCY FOLLOWING THORACIC SURGERY (HCC): Status: RESOLVED | Noted: 2024-05-21 | Resolved: 2024-05-21

## 2024-05-21 PROBLEM — J95.1 ACUTE PULMONARY INSUFFICIENCY FOLLOWING THORACIC SURGERY (HCC): Status: ACTIVE | Noted: 2024-05-21

## 2024-05-21 PROCEDURE — 99214 OFFICE O/P EST MOD 30 MIN: CPT | Performed by: FAMILY MEDICINE

## 2024-05-21 PROCEDURE — G2211 COMPLEX E/M VISIT ADD ON: HCPCS | Performed by: FAMILY MEDICINE

## 2024-05-21 NOTE — PROGRESS NOTES
Ambulatory Visit  Name: Massimo Burnett      : 1950      MRN: [id number]  Encounter Provider: Kyra Vasquez MD  Encounter Date: 2024   Encounter department: [facility]    Assessment & Plan   1. Coronary artery disease involving native coronary artery of native heart without angina pectoris  Assessment & Plan:  Patient is S/P CABG x 1 in 3/2023.  Denies chest pain, shortness of breath, dizziness.    Continue aspirin 81 mg daily, beta-blocker, Rosuvastatin.    Follow-up with cardiology Dr. Lagunas.  Orders:  -     Comprehensive metabolic panel; Future; Expected date: 11/10/2024  2. S/P AVR (aortic valve replacement)  Assessment & Plan:  S/P AVR in 3/2023.  Asymptomatic.    Follow-up with cardiology Dr. Lagunas every 6 months.  3. Dyslipidemia  Assessment & Plan:  LDL goal <70.  Recommended to take Rosuvastatin 20 mg 1 tablet daily on a regular basis.    Follow a low-cholesterol diet, regular exercise.    Recheck lipid panel in 6 months.  Orders:  -     Comprehensive metabolic panel; Future; Expected date: 11/10/2024  -     Lipid panel; Future; Expected date: 11/10/2024  4. Class 2 obesity due to excess calories without serious comorbidity with body mass index (BMI) of 35.0 to 35.9 in adult  Assessment & Plan:  Patient lost 5 pounds since last visit in 2023.    Encouraged to continue working on weight reduction.  5. Impaired fasting glucose  Assessment & Plan:  Hb A1C 5.8.    Recommended to follow a low-carb diet, lose weight.  Orders:  -     Comprehensive metabolic panel; Future; Expected date: 11/10/2024  -     Hemoglobin A1C; Future  6. Stage 3a chronic kidney disease (HCC)  Assessment & Plan:  Lab Results   Component Value Date    EGFR 61 2024    EGFR 55 2023    EGFR 68 2023    CREATININE 1.17 2024    CREATININE 1.29 2023    CREATININE 1.08 2023     Kidney function improved since last year.    Recommended to stay well-hydrated.     Avoid NSAIDs, nephrotoxins.  Orders:  -     CBC and differential; Future; Expected date: 11/10/2024  -     Comprehensive metabolic panel; Future; Expected date: 11/10/2024  7. Elevated PSA measurement  Assessment & Plan:  PSA 16.71.    Patient is awaiting for a phone call from urology to schedule prostate biopsy.  8. Pain of right heel  Assessment & Plan:  Will order X-ray right foot to r/o heel spur.  Recommended stretching exercises at home.    Continue to wear shoe insert.  Consider referral to podiatry if continues with pain.    Orders:  -     XR foot 2 vw right; Future; Expected date: 05/21/2024      Depression Screening and Follow-up Plan: Patient was screened for depression during today's encounter. They screened negative with a PHQ-2 score of 0.      Schedule follow-up visit,  medicare AWV in 6 months.  Check labs prior to next visit.      History of Present Illness       HPI    Patient presents for 6 month follow-up visit.    PMHx:  CAD, severe AS, S/P AVR, CABG x 1 in 3/2023,   Dyslipidemia, Obesity, IFG, CKD 3,  elevated PSA, ED.    Reviewed current medications, blood test results from 5/17/24.    Hb A1C 5.8, fasting blood sugar 124, potassium 4.0, creatinine 1.17, GFR 61.    Cholesterol 166, HDL 46,  ( previously 52 in February 2024) PSA is elevated at  16.71.    CAD/ S/P CABG x 1, AVR in 3/2023 - followed by cardiology Dr. Lagunas, was seen in March 2023.  Patient takes Rosuvastatin 20 mg daily, aspirin 81 mg daily and Metoprolol ER 25 mg 1 tablet every other day.    Dr. Lagunas recommended to take Metoprolol ER 25 mg -1/2 tablet daily.    Patient had echocardiogram done in February 2024: EF 65%, grade 1 (abnormal relaxation)  There is a bioprosthetic valve.  The prosthetic valve appears to be functioning normally.  Patient reports no chest pain or shortness of breath, dizziness.    He remains active, walks.  Patient planned a trip to [place] in June to visit his friends.    C/o pain in right heel  for over 1 month.  Denies foot injury.  Patient started using a shoe insert with improvement in pain.    Elevated PSA - patient is awaiting for urology to schedule prostate biopsy.    Reports no urinary symptoms.      Colonoscopy performed in 6/2022 by colorectal surgeon Dr. Camarillo who recommended to repeat colonoscopy in 10 years.     Family history is positive for diabetes in patient's mother.    Father had MI.    No family history of colon cancer or prostate cancer.      Review of Systems   Constitutional:  Negative for activity change, appetite change, chills, fatigue and fever.   HENT:  Negative for congestion, ear pain, hearing loss, sore throat and trouble swallowing.    Eyes:  Negative for pain, discharge, redness and itching.   Respiratory:  Negative for cough, chest tightness and shortness of breath.    Cardiovascular:  Negative for chest pain, palpitations and leg swelling.   Gastrointestinal:  Negative for abdominal pain, blood in stool, constipation, diarrhea, nausea and vomiting.   Genitourinary:  Negative for difficulty urinating, dysuria and hematuria.        Nocturia x 1   Musculoskeletal:  Negative for back pain, gait problem and joint swelling.        Pain in right heel   Skin:  Negative for rash.   Neurological:  Negative for dizziness, syncope and headaches.   Hematological: Negative.    Psychiatric/Behavioral:  Negative for dysphoric mood and sleep disturbance. The patient is not nervous/anxious.      Past Medical History:   Diagnosis Date    Asthma     Colon, diverticulosis     Nonspecific reaction to 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 active tuberculosis     CXR HAS BEEN CLEAR.    Pneumonia      Past Surgical History:   Procedure Laterality Date    CARDIAC CATHETERIZATION  02/17/2023    Procedure: Cardiac catheterization;  Surgeon: Lorena Alexandre DO;  Location: [facility];  Service: Cardiology    CARDIAC CATHETERIZATION N/A 02/17/2023    Procedure: Cardiac Coronary Angiogram;  Surgeon:  Lorena Alexandre, DO;  Location: [facility];  Service: Cardiology    COLONOSCOPY  11/2007    COMPLETE; DONE 11/2007 WITH NO POLYPS, +DIVERTICULA. 2/14    COLONOSCOPY  06/30/2022    DE RPLCMT AORTIC VALVE OPN W/STENTLESS TISSUE VALVE N/A 03/06/2023    Procedure: AVR WITH 25MM INSPIRIS VALVE/ CABG X 1, LIMA to LAD;  Surgeon: Taurus Mitchell DO;  Location: [facility];  Service: Cardiac Surgery    TONSILLECTOMY      LAST ASSESSED: 7/9/14     Family History   Problem Relation Age of Onset    Diabetes Mother         MELLITUS    Coronary artery disease Mother     Coronary artery disease Father     Stroke Father         SYNDROME     Social History     Tobacco Use    Smoking status: Never     Passive exposure: Never    Smokeless tobacco: Never   Substance and Sexual Activity    Alcohol use: No    Drug use: No    Sexual activity: Not on file     Current Outpatient Medications on File Prior to Visit   Medication Sig    aspirin (ECOTRIN LOW STRENGTH) 81 mg EC tablet Take 1 tablet (81 mg total) by mouth daily    cholecalciferol (VITAMIN D3) 1,000 units tablet Take 1,000 Units by mouth daily    metoprolol succinate (TOPROL-XL) 25 mg 24 hr tablet Take 0.5 tablets (12.5 mg total) by mouth daily (Patient taking differently: Take 25 mg by mouth every other day)    rosuvastatin (CRESTOR) 20 MG tablet Take 1 tablet (20 mg total) by mouth daily    [DISCONTINUED] ferrous sulfate 325 (65 Fe) mg tablet Take 1 tablet (325 mg total) by mouth daily with breakfast Do not start before March 11, 2023.     Allergies   Allergen Reactions    Eggs Or Egg-Derived Products - Food Allergy Hives    Fish-Derived Products - Food Allergy Hives    Tuberculin, Ppd Rash     Immunization History   Administered Date(s) Administered    COVID-19 MODERNA VACC 0.5 ML IM 03/04/2021, 03/25/2021, 04/09/2021, 12/01/2021    COVID-19 Moderna Vac BIVALENT 12 Yr+ IM 0.5 ML 11/22/2022    Pneumococcal Conjugate 13-Valent 07/03/2018    Pneumococcal Conjugate  "Vaccine 20-valent (Pcv20), Polysace 11/21/2023    Pneumococcal Polysaccharide PPV23 10/27/2021    Tdap 01/29/2008, 07/03/2018     Objective     /64 (BP Location: Left arm, Patient Position: Sitting, Cuff Size: Large)   Pulse (!) 53   Temp 98.3 °F (36.8 °C) (Tympanic)   Resp 18   Ht 5' 6\" (1.676 m)   Wt 97.7 kg (215 lb 6.4 oz)   SpO2 98%   BMI 34.77 kg/m²     Physical Exam  Vitals and nursing note reviewed.   Constitutional:       Appearance: Normal appearance. He is obese.   HENT:      Head: Normocephalic and atraumatic.   Eyes:      Conjunctiva/sclera: Conjunctivae normal.      Pupils: Pupils are equal, round, and reactive to light.   Neck:      Vascular: No carotid bruit.   Cardiovascular:      Rate and Rhythm: Regular rhythm. Bradycardia present.      Heart sounds: No murmur heard.  Pulmonary:      Effort: Pulmonary effort is normal.      Breath sounds: Normal breath sounds.   Abdominal:      General: There is no distension.      Palpations: Abdomen is soft.      Tenderness: There is no abdominal tenderness.   Musculoskeletal:         General: No swelling. Normal range of motion.      Cervical back: Normal range of motion and neck supple.      Right lower leg: No edema.      Left lower leg: No edema.      Comments: Right foot: no tenderness to palpation.  No swelling.     Skin:     General: Skin is warm and dry.      Findings: No rash.   Neurological:      Mental Status: He is alert.   Psychiatric:         Mood and Affect: Mood normal.       Administrative Statements     "

## 2024-05-22 NOTE — ASSESSMENT & PLAN NOTE
Patient is S/P CABG x 1 in 3/2023.  Denies chest pain, shortness of breath, dizziness.    Continue aspirin 81 mg daily, beta-blocker, Rosuvastatin.    Follow-up with cardiology Dr. Lagunas.

## 2024-05-22 NOTE — ASSESSMENT & PLAN NOTE
Patient lost 5 pounds since last visit in November 2023.    Encouraged to continue working on weight reduction.

## 2024-05-22 NOTE — ASSESSMENT & PLAN NOTE
Will order X-ray right foot to r/o heel spur.  Recommended stretching exercises at home.    Continue to wear shoe insert.  Consider referral to podiatry if continues with pain.

## 2024-05-22 NOTE — ASSESSMENT & PLAN NOTE
Lab Results   Component Value Date    EGFR 61 05/17/2024    EGFR 55 11/11/2023    EGFR 68 05/26/2023    CREATININE 1.17 05/17/2024    CREATININE 1.29 11/11/2023    CREATININE 1.08 05/26/2023     Kidney function improved since last year.    Recommended to stay well-hydrated.    Avoid NSAIDs, nephrotoxins.

## 2024-05-22 NOTE — ASSESSMENT & PLAN NOTE
LDL goal <70.  Recommended to take Rosuvastatin 20 mg 1 tablet daily on a regular basis.    Follow a low-cholesterol diet, regular exercise.    Recheck lipid panel in 6 months.

## 2024-05-23 ENCOUNTER — TELEPHONE (OUTPATIENT)
Dept: CARDIOLOGY CLINIC | Facility: CLINIC | Age: 74
End: 2024-05-23

## 2024-05-23 NOTE — TELEPHONE ENCOUNTER
----- Message -----  From: Lorena Lagunas MD  Sent: 5/17/2024   4:17 PM EDT  To: [facility]    Cholesterol levels are elevated, please ask if he has been taking his medication, was on rosuvastatin 20 mg before.  He should be on a statin going forward since he had bypass surgery along with his valve

## 2024-05-23 NOTE — TELEPHONE ENCOUNTER
Spoke with pt, advised pt of Dr. Lagunas's message. Pt wasn't taking the cholesterol medication for about a month. Per pt he started taking rosuvastatin 20 mg when he got his results.

## 2024-05-23 NOTE — RESULT ENCOUNTER NOTE
Spoke with pt, advised pt of Dr. Lagunas's message. Pt wasn't taking the cholesterol medication for about a month. Per pt he started taking rosuvastatin 20 mg after he saw his results.

## 2024-05-31 ENCOUNTER — HOSPITAL ENCOUNTER (OUTPATIENT)
Dept: RADIOLOGY | Facility: HOSPITAL | Age: 74
Discharge: HOME | End: 2024-05-31
Payer: COMMERCIAL

## 2024-05-31 ENCOUNTER — TELEPHONE (OUTPATIENT)
Age: 74
End: 2024-05-31

## 2024-05-31 DIAGNOSIS — M79.671 PAIN OF RIGHT HEEL: ICD-10-CM

## 2024-05-31 PROCEDURE — 73620 X-RAY EXAM OF FOOT: CPT

## 2024-05-31 NOTE — TELEPHONE ENCOUNTER
PT call in about is imaging for his foot and I confirm with script is in his [facility] chart. PT also confirm he going to the [facility]. Thank you

## 2024-06-27 ENCOUNTER — OFFICE VISIT (OUTPATIENT)
Dept: LAB | Age: 74
End: 2024-06-27
Payer: COMMERCIAL

## 2024-06-27 ENCOUNTER — APPOINTMENT (OUTPATIENT)
Dept: LAB | Age: 74
End: 2024-06-27
Payer: COMMERCIAL

## 2024-06-27 DIAGNOSIS — R39.89 SUSPECTED UTI: ICD-10-CM

## 2024-06-27 DIAGNOSIS — R97.20 ELEVATED PROSTATE SPECIFIC ANTIGEN (PSA): ICD-10-CM

## 2024-06-27 LAB
ALBUMIN SERPL BCG-MCNC: 4 G/DL (ref 3.5–5)
ALP SERPL-CCNC: 63 U/L (ref 34–104)
ALT SERPL W P-5'-P-CCNC: 14 U/L (ref 7–52)
ANION GAP SERPL CALCULATED.3IONS-SCNC: 9 MMOL/L (ref 4–13)
AST SERPL W P-5'-P-CCNC: 15 U/L (ref 13–39)
BASOPHILS # BLD AUTO: 0.04 THOUSANDS/ÂΜL (ref 0–0.1)
BASOPHILS NFR BLD AUTO: 1 % (ref 0–1)
BILIRUB SERPL-MCNC: 0.42 MG/DL (ref 0.2–1)
BUN SERPL-MCNC: 20 MG/DL (ref 5–25)
CALCIUM SERPL-MCNC: 9 MG/DL (ref 8.4–10.2)
CHLORIDE SERPL-SCNC: 105 MMOL/L (ref 96–108)
CO2 SERPL-SCNC: 25 MMOL/L (ref 21–32)
CREAT SERPL-MCNC: 1.12 MG/DL (ref 0.6–1.3)
EOSINOPHIL # BLD AUTO: 0.13 THOUSAND/ÂΜL (ref 0–0.61)
EOSINOPHIL NFR BLD AUTO: 2 % (ref 0–6)
ERYTHROCYTE [DISTWIDTH] IN BLOOD BY AUTOMATED COUNT: 12.7 % (ref 11.6–15.1)
GFR SERPL CREATININE-BSD FRML MDRD: 65 ML/MIN/1.73SQ M
GLUCOSE P FAST SERPL-MCNC: 116 MG/DL (ref 65–99)
HCT VFR BLD AUTO: 44.5 % (ref 36.5–49.3)
HGB BLD-MCNC: 14.8 G/DL (ref 12–17)
IMM GRANULOCYTES # BLD AUTO: 0.02 THOUSAND/UL (ref 0–0.2)
IMM GRANULOCYTES NFR BLD AUTO: 0 % (ref 0–2)
LYMPHOCYTES # BLD AUTO: 3.22 THOUSANDS/ÂΜL (ref 0.6–4.47)
LYMPHOCYTES NFR BLD AUTO: 45 % (ref 14–44)
MCH RBC QN AUTO: 32.3 PG (ref 26.8–34.3)
MCHC RBC AUTO-ENTMCNC: 33.3 G/DL (ref 31.4–37.4)
MCV RBC AUTO: 97 FL (ref 82–98)
MONOCYTES # BLD AUTO: 0.64 THOUSAND/ÂΜL (ref 0.17–1.22)
MONOCYTES NFR BLD AUTO: 9 % (ref 4–12)
NEUTROPHILS # BLD AUTO: 3.05 THOUSANDS/ÂΜL (ref 1.85–7.62)
NEUTS SEG NFR BLD AUTO: 43 % (ref 43–75)
NRBC BLD AUTO-RTO: 0 /100 WBCS
PLATELET # BLD AUTO: 183 THOUSANDS/UL (ref 149–390)
PMV BLD AUTO: 10.8 FL (ref 8.9–12.7)
POTASSIUM SERPL-SCNC: 4.2 MMOL/L (ref 3.5–5.3)
PROT SERPL-MCNC: 6.7 G/DL (ref 6.4–8.4)
RBC # BLD AUTO: 4.58 MILLION/UL (ref 3.88–5.62)
SODIUM SERPL-SCNC: 139 MMOL/L (ref 135–147)
WBC # BLD AUTO: 7.1 THOUSAND/UL (ref 4.31–10.16)

## 2024-06-27 PROCEDURE — 85025 COMPLETE CBC W/AUTO DIFF WBC: CPT

## 2024-06-27 PROCEDURE — 93005 ELECTROCARDIOGRAM TRACING: CPT

## 2024-06-27 PROCEDURE — 36415 COLL VENOUS BLD VENIPUNCTURE: CPT

## 2024-06-27 PROCEDURE — 80053 COMPREHEN METABOLIC PANEL: CPT

## 2024-06-27 PROCEDURE — 87086 URINE CULTURE/COLONY COUNT: CPT

## 2024-06-28 LAB — BACTERIA UR CULT: NORMAL

## 2024-06-30 LAB
ATRIAL RATE: 63 BPM
P AXIS: 15 DEGREES
PR INTERVAL: 140 MS
QRS AXIS: -14 DEGREES
QRSD INTERVAL: 80 MS
QT INTERVAL: 410 MS
QTC INTERVAL: 419 MS
T WAVE AXIS: 50 DEGREES
VENTRICULAR RATE: 63 BPM

## 2024-06-30 PROCEDURE — 93010 ELECTROCARDIOGRAM REPORT: CPT | Performed by: INTERNAL MEDICINE

## 2024-07-01 ENCOUNTER — APPOINTMENT (OUTPATIENT)
Dept: LAB | Facility: CLINIC | Age: 74
End: 2024-07-01
Payer: COMMERCIAL

## 2024-07-01 DIAGNOSIS — R73.01 IMPAIRED FASTING GLUCOSE: ICD-10-CM

## 2024-07-01 DIAGNOSIS — R97.20 ELEVATED PSA: ICD-10-CM

## 2024-07-01 LAB — PSA SERPL-MCNC: 7.67 NG/ML (ref 0–4)

## 2024-07-01 PROCEDURE — 84153 ASSAY OF PSA TOTAL: CPT

## 2024-07-01 PROCEDURE — 36415 COLL VENOUS BLD VENIPUNCTURE: CPT

## 2024-07-02 ENCOUNTER — TELEPHONE (OUTPATIENT)
Age: 74
End: 2024-07-02

## 2024-07-02 ENCOUNTER — TELEPHONE (OUTPATIENT)
Dept: CARDIOLOGY CLINIC | Facility: CLINIC | Age: 74
End: 2024-07-02

## 2024-07-02 NOTE — TELEPHONE ENCOUNTER
Mercy Villanueva RN  P Cardiology [facility] [place]  In need of ASA 81 mg daily hold instructions for TRANSPERINEAL MRI FUSION BIOPSY PROSTATE scheduled for 7/10/24.  Thanks

## 2024-08-27 DIAGNOSIS — I25.10 CORONARY ARTERY DISEASE INVOLVING NATIVE CORONARY ARTERY: ICD-10-CM

## 2024-08-28 RX ORDER — ROSUVASTATIN CALCIUM 20 MG/1
20 TABLET, COATED ORAL DAILY
Qty: 90 TABLET | Refills: 3 | Status: SHIPPED | OUTPATIENT
Start: 2024-08-28

## 2024-08-28 RX ORDER — METOPROLOL SUCCINATE 25 MG/1
12.5 TABLET, EXTENDED RELEASE ORAL DAILY
Qty: 45 TABLET | Refills: 3 | Status: SHIPPED | OUTPATIENT
Start: 2024-08-28

## 2024-10-10 NOTE — PROGRESS NOTES
Cardiology Follow Up    Massimo Burnett  1950  [id number]  [facility]  [address]  [place] [address] [phone]    Assessment & Plan  S/P AVR (aortic valve replacement)  2/06/24 TTE showed Bioprosthetic aortic valve no evidence of regurgitation no significant stenosis  Stopped all his medications due to side effects, Taking ASA 81mg twice a week   Encouraged ASA 81mg daily, Massimo is will to take daily  Refusing statin   Heart healthy diet,   Lifelong use of antibiotics 1 hour prior to dental procedures  S/P CABG x 1  Denies  CP   As above   Hyperlipidemia, unspecified hyperlipidemia type  5/17/24   TG 51, HDL 46,   Adverse reaction from Crestor with heel pain.  Off Crestor for 2 months.  Instructed to undergo fasting lipid panel in one month.  Briefly Educated on PCSK9I.    Heart healthy diet   Obesity (BMI 30.0-34.9)  Calorie control and daily walking for weight loss         Interval History:   Mr Massimo Burnett presents to our office for a follow up visit.  He is feeling well walking 3.5 miles a day.  According to Massimo he has been traveling all over the world and feeling well.  He is not taking any of his medications other than aspirin twice a week for the last 2 months.  He was experiencing adverse effects of his medications such as pain in his heel, bradycardia, difficulty sleeping, overall agitation.  Since stopping his medications he is feeling much better.  I have Educated  Massimo about the risk of stopping each medication.  He is abiding by a heart healthy diet but is not forthcoming to what he is actually eating.  He has lab studies to be done in the near future such as a lipid panel and hemoglobin A1c.      Medical History   Primary Cardiologist Dr Lagunas  Critical AS and CAD, 3/06/24  AVR #25mm Inspiris, CABG x 1 SALDANA to LAD by Dr Mitchell.   Hyperlipidemia 5/17/24   TG 51, HDL 46, LDL  110  Pre DM HgbA1C 5.8   on 5/17/24   Family hx of CAD in his father passing of a MENDIETA in his 60's.    2/06/24 LVEF 65% 1 abnormal relaxation right ventricular size systolic function normal.  Left atrium mildly dilated right atrium normal in size.  Bioprosthetic aortic valve no evidence of regurgitation no significant stenosis.  Trace MR, trace TR.  Patient Active Problem List   Diagnosis    ED (erectile dysfunction)    Impaired fasting glucose    Obesity    Dyslipidemia    Elevated PSA measurement    Critical aortic valve stenosis    Coronary artery disease involving native coronary artery    S/P CABG x 1    S/P AVR (aortic valve replacement)    Stage 3a chronic kidney disease (HCC)    Pain of right heel     Past Medical History:   Diagnosis Date    Asthma     as child    Colon, diverticulosis     Hyperlipidemia     Hypertension     Nonspecific reaction to 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 active tuberculosis     CXR HAS BEEN CLEAR.    Pneumonia      Social History     Socioeconomic History    Marital status: /Civil Union     Spouse name: Not on file    Number of children: Not on file    Years of education: Not on file    Highest education level: Not on file   Occupational History    Not on file   Tobacco Use    Smoking status: Never     Passive exposure: Never    Smokeless tobacco: Never   Vaping Use    Vaping status: Never Used   Substance and Sexual Activity    Alcohol use: No    Drug use: No    Sexual activity: Yes   Other Topics Concern    Not on file   Social History Narrative    Not on file     Social Determinants of Health     Financial Resource Strain: Low Risk  (11/14/2023)    Overall Financial Resource Strain (CARDIA)     Difficulty of Paying Living Expenses: Not hard at all   Food Insecurity: No Food Insecurity (3/7/2023)    Hunger Vital Sign     Worried About Running Out of Food in the Last Year: Never true     Ran Out of Food in the Last Year: Never true   Transportation Needs: No Transportation Needs  (11/14/2023)    PRAPARE - Transportation     Lack of Transportation (Medical): No     Lack of Transportation (Non-Medical): No   Physical Activity: Not on file   Stress: Not on file   Social Connections: Not on file   Intimate Partner Violence: Not on file   Housing Stability: Low Risk  (3/7/2023)    Housing Stability Vital Sign     Unable to Pay for Housing in the Last Year: No     Number of Places Lived in the Last Year: 1     Unstable Housing in the Last Year: No      Family History   Problem Relation Age of Onset    Diabetes Mother         MELLITUS    Coronary artery disease Mother     Coronary artery disease Father     Stroke Father         SYNDROME     Past Surgical History:   Procedure Laterality Date    CARDIAC CATHETERIZATION  02/17/2023    Procedure: Cardiac catheterization;  Surgeon: Lorena Alexandre DO;  Location: [facility];  Service: Cardiology    CARDIAC CATHETERIZATION N/A 02/17/2023    Procedure: Cardiac Coronary Angiogram;  Surgeon: Lorena Alexandre DO;  Location: [facility];  Service: Cardiology    COLONOSCOPY  11/2007    COMPLETE; DONE 11/2007 WITH NO POLYPS, +DIVERTICULA. 2/14    COLONOSCOPY  06/30/2022    VA RPLCMT AORTIC VALVE OPN W/STENTLESS TISSUE VALVE N/A 03/06/2023    Procedure: AVR WITH 25MM INSPIRIS VALVE/ CABG X 1, LIMA to LAD;  Surgeon: Taurus Mitchell DO;  Location: [facility];  Service: Cardiac Surgery    TONSILLECTOMY      LAST ASSESSED: 7/9/14       Current Outpatient Medications:     aspirin (ECOTRIN LOW STRENGTH) 81 mg EC tablet, Take 1 tablet (81 mg total) by mouth daily, Disp: 90 tablet, Rfl: 3    cholecalciferol (VITAMIN D3) 1,000 units tablet, Take 1,000 Units by mouth daily, Disp: , Rfl:     metoprolol succinate (TOPROL-XL) 25 mg 24 hr tablet, TAKE 1/2 TABLET BY MOUTH EVERY DAY, Disp: 45 tablet, Rfl: 3    rosuvastatin (CRESTOR) 20 MG tablet, TAKE 1 TABLET BY MOUTH EVERY DAY, Disp: 90 tablet, Rfl: 3  Allergies   Allergen Reactions    Eggs Or  "Egg-Derived Products - Food Allergy Hives    Fish-Derived Products - Food Allergy Hives    Tuberculin, Ppd Rash       Labs:  No visits with results within 2 Month(s) from this visit.   Latest known visit with results is:   Appointment on 07/01/2024   Component Date Value    PSA, Diagnostic 07/01/2024 7.674 (H)      Imaging: XR chest portable    Result Date: 3/8/2023  Narrative: CHEST INDICATION:   Post Open Heart Surgey. COMPARISON:  3/6/2023.  Chest CT dated 3/1/2023. EXAM PERFORMED/VIEWS:  XR CHEST PORTABLE FINDINGS:  Right IJ catheter tip in the high right atrium.  Intact median sternotomy wires.  Aortic valvular prosthesis.  Unchanged mediastinal and pleural drains.  Endotracheal tube, enteric tube, and pulmonary arterial catheter have been removed. Heart is enlarged but unchanged.  Mediastinum appears widened secondary to patient rotation and low lung volumes. Hypoventilation.  Streaky opacities at the right base appears similar to prior.  Increasing dense left retrocardiac consolidation. Bones are unremarkable.     Impression: Increasing left retrocardiac consolidation, likely atelectasis unless there is compelling clinical evidence for developing pneumonia. Workstation performed: [id number]     CT chest wo contrast    Result Date: 3/3/2023  Narrative: CT CHEST WITHOUT IV CONTRAST INDICATION:   I35.0: Nonrheumatic aortic (valve) stenosis I25.10: Atherosclerotic heart disease of native coronary artery without angina pectoris Z01.810: Encounter for preprocedural cardiovascular examination Z01.812: Encounter for preprocedural laboratory examination.  \"Rule out aortic calcification prior to CABG/AVR.\" COMPARISON:  CXR 4/3/2017. TECHNIQUE: Chest CT without intravenous contrast.  Axial, sagittal, coronal 2D reformats and coronal MIPS from source data. Radiation dose length product (DLP):  569.77 mGy-cm . Radiation dose exposure minimized using iterative reconstruction and automated exposure control. FINDINGS: " LUNGS:  Lungs clear. AIRWAYS: No significant filling defects. PLEURA:  Unremarkable. HEART/GREAT VESSELS:  Normal heart size.  Dense calcification of the aortic valve leaflets.  Normal caliber ascending aorta with no calcification. No pericardial effusion or adhesion. Moderate coronary artery calcification indicating atherosclerotic heart disease. MEDIASTINUM AND JOON:  Unremarkable. CHEST WALL AND LOWER NECK: Unremarkable. UPPER ABDOMEN:  A few tiny hypodensities, too small to characterize, likely cysts.  Benign hepatic calcification.  Colonic diverticuli. OSSEOUS STRUCTURES: Mild degenerative disease in the spine.     Impression: Normal caliber ascending aorta with no calcification. No acute pulmonary disease.  Workstation performed: [id number]     FLORIN Anesthesia    Result Date: 3/6/2023  Narrative: John Lema MD     3/6/2023 11:07 AM Procedure Performed: FLORIN Anesthesia Start Time:  3/6/2023 7:48 AM Preanesthesia Checklist Patient identified, IV assessed, risks and benefits discussed, monitors and equipment assessed, procedure being performed at surgeon's request and anesthesia consent obtained. Procedure Diagnostic Indications for FLORIN:  assessment of surgical repair. Type of FLORIN: complete FLORIN with interpretation. Images Saved: ultrasound permanent image saved. Physician Requesting Echo: Taurus Mitchell DO.  Location performed: OR. Intubated. Bite block not placed.  Heart visualized. Insertion of FLORIN Probe:  Atraumatic. Probe Type:  Multiplane. Modalities:  2D only, color flow mapping, continuous wave Doppler and pulse wave Doppler. Echocardiographic and Doppler Measurements PREPROCEDURE LEFT VENTRICLE: Systolic Function: normal. Ejection Fraction: 65%. Cavity size: normal.   RIGHT VENTRICLE: Systolic Function: normal.  Cavity size moderately dilated. No hypertrophy  AORTIC VALVE: Leaflets: bileaflet. Leaflet motions restricted. Stenosis: severe.     Regurgitation: trace.  MITRAL VALVE: Leaflets:  normal. Leaflet Motions: normal. Regurgitation: mild.   Stenosis: none.   TRICUSPID VALVE: Leaflets: normal. Leaflet Motions: normal. Stenosis: none. Regurgitation: trace. PULMONIC VALVE: Leaflets: normal. Regurgitation: none. Stenosis: none. ASCENDING AORTA: Size:  normal.  Dissection not present.  AORTIC ARCH: Size:  normal.  dissection not present. Grade 2: severe intimal thickening without protruding atheroma. DESCENDING AORTA: Size: normal.  Dissection not present. Grade 2: severe intimal thickening without protruding atheroma. RIGHT ATRIUM: Size:  normal. LEFT ATRIUM: Size: normal. LEFT ATRIAL APPENDAGE: Size: normal.  ATRIAL SEPTUM: Intra-atrial septal morphology: normal.  VENTRICULAR SEPTUM: Intra-ventricular septum morphology: normal. EPIAORTIC: Plaque Thickness: 0-5 mm. OTHER FINDINGS: Pericardium:  normal. Pleural Effusion:  none. POSTPROCEDURE LEFT VENTRICLE: Unchanged .    RIGHT VENTRICLE: Unchanged .  AORTIC VALVE: Leaflets: bioprosthetic. Stenosis: none. Mean Gradient: 6 mmHg. Regurgitation: none.  Valve Size: 25 mm. MITRAL VALVE: Unchanged .    TRICUSPID VALVE: Unchanged .   PULMONIC VALVE: Unchanged   ATRIA: Unchanged .   AORTA: Unchanged . REMOVAL: Probe Removal: atraumatic.      VAS carotid complete study    Result Date: 3/2/2023  Narrative:  [facility] REPORT CLINICAL: Indications: Pre-op evaluation for TVAR. Operative History: 2023-02-17 Cardiac catheterization Risk Factors The patient has history of Hyperlipidemia. Clinical Right Pressure:  110/ mm Hg, Left Pressure:  114/ mm Hg.  FINDINGS:  Right        Impression  PSV  EDV (cm/s)  Direction of Flow  Ratio  Dist. ICA                 41          22                      0.55  Mid. ICA                  63          29                      0.84  Prox. ICA    Normal       56          17                      0.75  Dist CCA                  63          21                            Mid CCA                   75          17                       1.25  Prox CCA                  60          12                      0.61  Ext Carotid               99          12                      1.32  Prox Vert                 55          15  Antegrade                 Subclavian               150           0                            Innominate                98           7                             Left         Impression  PSV  EDV (cm/s)  Direction of Flow  Ratio  Dist. ICA                 56          18                      0.85  Mid. ICA                  83          22                      1.26  Prox. ICA    1 - 49%      69          18                      1.04  Dist CCA                  60          17                            Mid CCA                   66          15                      1.20  Prox CCA                  55          17                            Ext Carotid               81          13                      1.22  Prox Vert                 46          19  Antegrade                 Subclavian               117          14                               CONCLUSION: Impression RIGHT: There is no evidence of arterial disease throughout the extracranial carotid system. Vertebral artery flow is antegrade. There is no significant subclavian artery disease.  LEFT: There is <50% stenosis noted in the internal carotid artery. Plaque is homogenous and smooth. Vertebral artery flow is antegrade. There is no significant subclavian artery disease.  There is no previous study for comparison.  SIGNATURE: Electronically Signed by: SANDIE SANCHEZ DO on 2023-03-02 12:51:56 PM    XR chest portable ICU    Result Date: 3/6/2023  Narrative: CHEST INDICATION:   S/P open heart. COMPARISON:  4/3/2017, CT chest 3/1/2023 EXAM PERFORMED/VIEWS:  XR CHEST PORTABLE ICU  AP supine Images: 3 FINDINGS: -ET tube terminates about 1.7 cm above the chepe. -Enteric tube courses to the stomach. -Right IJ central line and [place] catheter terminate in the region of the atriocaval junction and  main pulmonary artery respectively. Cardiomediastinal silhouette appears unremarkable. Status post median sternotomy with AVR, mediastinal and left pleural drains. Shallow depth of inspiration with resultant vascular crowding and subsegmental atelectasis medial right base. No discernible pneumothorax or layering pleural effusion on limited supine imaging. Osseous structures appear within normal limits for patient age.     Impression: Lines and tubes as above. Low lung volumes with resultant vascular crowding and subsegmental atelectasis medial right base. Workstation performed: [id number]     FLORIN intraop interventional w/realtime guidance of cardiac procedures    Result Date: 3/7/2023  Narrative: This order contains the linked images for the FLORIN that was performed by the Anesthesiologist.  Please see the  CARDIAC FLORIN ANESTHESIA procedure for results.      Review of Systems:  Review of Systems   Musculoskeletal:         Using a walker to assist with ambulation    All other systems reviewed and are negative.      Physical Exam:  Physical Exam  Vitals reviewed.   Constitutional:       Appearance: Normal appearance.   Cardiovascular:      Rate and Rhythm: Normal rate and regular rhythm.      Pulses: Normal pulses.      Heart sounds: Normal heart sounds.   Pulmonary:      Effort: Pulmonary effort is normal.      Breath sounds: Normal breath sounds.   Abdominal:      General: Bowel sounds are normal.      Palpations: Abdomen is soft.   Musculoskeletal:         General: Normal range of motion.      Cervical back: Normal range of motion and neck supple.      Right lower leg: No edema.      Left lower leg: No edema.   Skin:     General: Skin is warm and dry.      Capillary Refill: Capillary refill takes less than 2 seconds.   Neurological:      General: No focal deficit present.      Mental Status: He is alert and oriented to person, place, and time.   Psychiatric:         Mood and Affect: Mood normal.         Behavior:  Behavior normal.

## 2024-10-14 ENCOUNTER — OFFICE VISIT (OUTPATIENT)
Dept: CARDIOLOGY CLINIC | Facility: CLINIC | Age: 74
End: 2024-10-14
Payer: COMMERCIAL

## 2024-10-14 VITALS
HEIGHT: 66 IN | BODY MASS INDEX: 34.82 KG/M2 | SYSTOLIC BLOOD PRESSURE: 130 MMHG | DIASTOLIC BLOOD PRESSURE: 86 MMHG | HEART RATE: 71 BPM | WEIGHT: 216.7 LBS | OXYGEN SATURATION: 98 %

## 2024-10-14 DIAGNOSIS — Z95.2 S/P AVR (AORTIC VALVE REPLACEMENT): Primary | ICD-10-CM

## 2024-10-14 DIAGNOSIS — Z95.1 S/P CABG X 1: ICD-10-CM

## 2024-10-14 DIAGNOSIS — E66.811 OBESITY (BMI 30.0-34.9): ICD-10-CM

## 2024-10-14 DIAGNOSIS — E78.5 HYPERLIPIDEMIA, UNSPECIFIED HYPERLIPIDEMIA TYPE: ICD-10-CM

## 2024-10-14 PROCEDURE — 99215 OFFICE O/P EST HI 40 MIN: CPT | Performed by: NURSE PRACTITIONER

## 2024-10-14 NOTE — ASSESSMENT & PLAN NOTE
2/06/24 TTE showed Bioprosthetic aortic valve no evidence of regurgitation no significant stenosis  Stopped all his medications due to side effects, Taking ASA 81mg twice a week   Encouraged ASA 81mg daily, Massimo is will to take daily  Refusing statin   Heart healthy diet,   Lifelong use of antibiotics 1 hour prior to dental procedures

## 2024-10-14 NOTE — ASSESSMENT & PLAN NOTE
5/17/24   TG 51, HDL 46,   Adverse reaction from Crestor with heel pain.  Off Crestor for 2 months.  Instructed to undergo fasting lipid panel in one month.  Briefly Educated on PCSK9I.    Heart healthy diet

## 2024-10-16 ENCOUNTER — TELEPHONE (OUTPATIENT)
Age: 74
End: 2024-10-16

## 2024-10-16 NOTE — TELEPHONE ENCOUNTER
Patient was calling to confirm AMW . Also wanted to state some concerns.  As of 3 months ago he stopped taking his statins because of the side effects . He was having joint pain and foot pain. After stopping felt better. Is there another statin with less side effects that can be recommended? Also would like a PSA lab added due to the last PSA having concerning results? Please advise patient.

## 2024-10-17 DIAGNOSIS — R97.20 ELEVATED PSA MEASUREMENT: Primary | ICD-10-CM

## 2024-10-17 NOTE — TELEPHONE ENCOUNTER
Spoke to patient. States that it he did reach out to cardiology about the rosuvastatin. They told him to reach out to his PCP office for recommendations they didn't recommend any other statins. Please review and advise. Thank you

## 2024-11-21 ENCOUNTER — RA CDI HCC (OUTPATIENT)
Dept: OTHER | Facility: HOSPITAL | Age: 74
End: 2024-11-21

## 2024-11-26 ENCOUNTER — APPOINTMENT (OUTPATIENT)
Dept: LAB | Facility: CLINIC | Age: 74
End: 2024-11-26
Payer: COMMERCIAL

## 2024-11-26 DIAGNOSIS — E78.5 DYSLIPIDEMIA: ICD-10-CM

## 2024-11-26 DIAGNOSIS — I25.10 CORONARY ARTERY DISEASE INVOLVING NATIVE CORONARY ARTERY OF NATIVE HEART WITHOUT ANGINA PECTORIS: ICD-10-CM

## 2024-11-26 DIAGNOSIS — R97.20 ELEVATED PSA MEASUREMENT: ICD-10-CM

## 2024-11-26 DIAGNOSIS — N18.31 STAGE 3A CHRONIC KIDNEY DISEASE (HCC): ICD-10-CM

## 2024-11-26 DIAGNOSIS — R73.01 IMPAIRED FASTING GLUCOSE: ICD-10-CM

## 2024-11-26 LAB
ALBUMIN SERPL BCG-MCNC: 4 G/DL (ref 3.5–5)
ALP SERPL-CCNC: 75 U/L (ref 34–104)
ALT SERPL W P-5'-P-CCNC: 14 U/L (ref 7–52)
ANION GAP SERPL CALCULATED.3IONS-SCNC: 5 MMOL/L (ref 4–13)
AST SERPL W P-5'-P-CCNC: 17 U/L (ref 13–39)
BASOPHILS # BLD AUTO: 0.05 THOUSANDS/ΜL (ref 0–0.1)
BASOPHILS NFR BLD AUTO: 1 % (ref 0–1)
BILIRUB SERPL-MCNC: 0.39 MG/DL (ref 0.2–1)
BUN SERPL-MCNC: 15 MG/DL (ref 5–25)
CALCIUM SERPL-MCNC: 9.1 MG/DL (ref 8.4–10.2)
CHLORIDE SERPL-SCNC: 107 MMOL/L (ref 96–108)
CHOLEST SERPL-MCNC: 173 MG/DL (ref ?–200)
CO2 SERPL-SCNC: 27 MMOL/L (ref 21–32)
CREAT SERPL-MCNC: 1.23 MG/DL (ref 0.6–1.3)
EOSINOPHIL # BLD AUTO: 0.12 THOUSAND/ΜL (ref 0–0.61)
EOSINOPHIL NFR BLD AUTO: 2 % (ref 0–6)
ERYTHROCYTE [DISTWIDTH] IN BLOOD BY AUTOMATED COUNT: 12.5 % (ref 11.6–15.1)
EST. AVERAGE GLUCOSE BLD GHB EST-MCNC: 114 MG/DL
GFR SERPL CREATININE-BSD FRML MDRD: 58 ML/MIN/1.73SQ M
GLUCOSE P FAST SERPL-MCNC: 122 MG/DL (ref 65–99)
HBA1C MFR BLD: 5.6 %
HCT VFR BLD AUTO: 45.4 % (ref 36.5–49.3)
HDLC SERPL-MCNC: 48 MG/DL
HGB BLD-MCNC: 14.8 G/DL (ref 12–17)
IMM GRANULOCYTES # BLD AUTO: 0.02 THOUSAND/UL (ref 0–0.2)
IMM GRANULOCYTES NFR BLD AUTO: 0 % (ref 0–2)
LDLC SERPL CALC-MCNC: 115 MG/DL (ref 0–100)
LYMPHOCYTES # BLD AUTO: 2.69 THOUSANDS/ΜL (ref 0.6–4.47)
LYMPHOCYTES NFR BLD AUTO: 39 % (ref 14–44)
MCH RBC QN AUTO: 31.9 PG (ref 26.8–34.3)
MCHC RBC AUTO-ENTMCNC: 32.6 G/DL (ref 31.4–37.4)
MCV RBC AUTO: 98 FL (ref 82–98)
MONOCYTES # BLD AUTO: 0.64 THOUSAND/ΜL (ref 0.17–1.22)
MONOCYTES NFR BLD AUTO: 9 % (ref 4–12)
NEUTROPHILS # BLD AUTO: 3.45 THOUSANDS/ΜL (ref 1.85–7.62)
NEUTS SEG NFR BLD AUTO: 49 % (ref 43–75)
NONHDLC SERPL-MCNC: 125 MG/DL
NRBC BLD AUTO-RTO: 0 /100 WBCS
PLATELET # BLD AUTO: 170 THOUSANDS/UL (ref 149–390)
PMV BLD AUTO: 10.4 FL (ref 8.9–12.7)
POTASSIUM SERPL-SCNC: 4.1 MMOL/L (ref 3.5–5.3)
PROT SERPL-MCNC: 6.8 G/DL (ref 6.4–8.4)
PSA SERPL-MCNC: 11.46 NG/ML (ref 0–4)
RBC # BLD AUTO: 4.64 MILLION/UL (ref 3.88–5.62)
SODIUM SERPL-SCNC: 139 MMOL/L (ref 135–147)
TRIGL SERPL-MCNC: 48 MG/DL (ref ?–150)
WBC # BLD AUTO: 6.97 THOUSAND/UL (ref 4.31–10.16)

## 2024-11-26 PROCEDURE — 36415 COLL VENOUS BLD VENIPUNCTURE: CPT

## 2024-11-26 PROCEDURE — 85025 COMPLETE CBC W/AUTO DIFF WBC: CPT

## 2024-11-26 PROCEDURE — 80053 COMPREHEN METABOLIC PANEL: CPT

## 2024-11-26 PROCEDURE — 80061 LIPID PANEL: CPT

## 2024-11-26 PROCEDURE — 84153 ASSAY OF PSA TOTAL: CPT

## 2024-11-27 ENCOUNTER — OFFICE VISIT (OUTPATIENT)
Dept: FAMILY MEDICINE CLINIC | Facility: CLINIC | Age: 74
End: 2024-11-27
Payer: COMMERCIAL

## 2024-11-27 VITALS
SYSTOLIC BLOOD PRESSURE: 122 MMHG | BODY MASS INDEX: 34.52 KG/M2 | OXYGEN SATURATION: 100 % | HEART RATE: 61 BPM | WEIGHT: 214.8 LBS | RESPIRATION RATE: 18 BRPM | HEIGHT: 66 IN | DIASTOLIC BLOOD PRESSURE: 78 MMHG | TEMPERATURE: 97.6 F

## 2024-11-27 DIAGNOSIS — Z00.00 MEDICARE ANNUAL WELLNESS VISIT, SUBSEQUENT: ICD-10-CM

## 2024-11-27 DIAGNOSIS — R97.20 ELEVATED PSA MEASUREMENT: ICD-10-CM

## 2024-11-27 DIAGNOSIS — I25.10 CORONARY ARTERY DISEASE INVOLVING NATIVE CORONARY ARTERY OF NATIVE HEART WITHOUT ANGINA PECTORIS: Primary | ICD-10-CM

## 2024-11-27 DIAGNOSIS — E78.5 DYSLIPIDEMIA: ICD-10-CM

## 2024-11-27 DIAGNOSIS — E66.09 CLASS 1 OBESITY DUE TO EXCESS CALORIES WITH BODY MASS INDEX (BMI) OF 34.0 TO 34.9 IN ADULT, UNSPECIFIED WHETHER SERIOUS COMORBIDITY PRESENT: ICD-10-CM

## 2024-11-27 DIAGNOSIS — Z95.2 S/P AVR (AORTIC VALVE REPLACEMENT): ICD-10-CM

## 2024-11-27 DIAGNOSIS — E66.811 CLASS 1 OBESITY DUE TO EXCESS CALORIES WITH BODY MASS INDEX (BMI) OF 34.0 TO 34.9 IN ADULT, UNSPECIFIED WHETHER SERIOUS COMORBIDITY PRESENT: ICD-10-CM

## 2024-11-27 DIAGNOSIS — N18.31 STAGE 3A CHRONIC KIDNEY DISEASE (HCC): ICD-10-CM

## 2024-11-27 DIAGNOSIS — R73.01 IMPAIRED FASTING GLUCOSE: ICD-10-CM

## 2024-11-27 DIAGNOSIS — Z95.1 S/P CABG X 1: ICD-10-CM

## 2024-11-27 PROCEDURE — 99214 OFFICE O/P EST MOD 30 MIN: CPT | Performed by: FAMILY MEDICINE

## 2024-11-27 PROCEDURE — G0439 PPPS, SUBSEQ VISIT: HCPCS | Performed by: FAMILY MEDICINE

## 2024-11-27 NOTE — ASSESSMENT & PLAN NOTE
Patient canceled MRI fusion prostate biopsy in July.    He will call urology to schedule procedure in January 2025 after returning from trip to  [place].

## 2024-11-27 NOTE — ASSESSMENT & PLAN NOTE
S/P CABG x 1 in 3/2023.  Patient denies chest pain, dizziness, shortness of breath.    He stopped taking beta-blocker, statin due to adverse side effects.    Takes aspirin 81 mg only 2 times per week.    Recommended to take aspirin 81 mg daily as discussed with cardiology CRNP last month.    Orders:    Comprehensive metabolic panel; Future

## 2024-11-27 NOTE — PROGRESS NOTES
Name: Massimo Burnett      : 1950      MRN: [id number]  Encounter Provider: Kyra Vasquez MD  Encounter Date: 2024   Encounter department: [facility]    Chief Complaint   Patient presents with    Medicare Wellness Visit     Subsequent visit      Health Maintenance   Topic Date Due    Zoster Vaccine (1 of 2) Never done    Influenza Vaccine (1) Never done    COVID-19 Vaccine ( season) 2024    Medicare Annual Wellness Visit (AWV)  2024    Fall Risk  2025    Depression Screening  2025    RSV Vaccine Age 60+ Years (1 - 1-dose 75+ series) 2027    Colorectal Cancer Screening  2032    Hepatitis C Screening  Completed    Pneumococcal Vaccine: 65+ Years  Completed    RSV Vaccine age 0-20 Months  Aged Out    HIB Vaccine  Aged Out    IPV Vaccine  Aged Out    Hepatitis A Vaccine  Aged Out    Meningococcal ACWY Vaccine  Aged Out    HPV Vaccine  Aged Out       Assessment & Plan  Coronary artery disease involving native coronary artery of native heart without angina pectoris  S/P CABG x 1 in 3/2023.  Patient denies chest pain, dizziness, shortness of breath.    He stopped taking beta-blocker, statin due to adverse side effects.    Takes aspirin 81 mg only 2 times per week.    Recommended to take aspirin 81 mg daily as discussed with cardiology CRNP last month.    Orders:    Comprehensive metabolic panel; Future    S/P CABG x 1  Patient is asymptomatic.  Follow-up with cardiology.         S/P AVR (aortic valve replacement)  Continue follow-up with cardiology every 6 months.       Dyslipidemia  Refusing taking statin due to experiencing side effects while taking Rosuvastatin.  LDL goal is 70.  Recommended to follow a low-cholesterol diet, regular exercise.    Recheck lipid panel in 6 months.    Orders:    Comprehensive metabolic panel; Future    Lipid panel; Future    Stage 3a chronic kidney disease (HCC)  Lab Results   Component Value Date    EGFR  58 11/26/2024    EGFR 65 06/27/2024    EGFR 61 05/17/2024    CREATININE 1.23 11/26/2024    CREATININE 1.12 06/27/2024    CREATININE 1.17 05/17/2024     Encouraged patient to stay well-hydrated.  Avoid NSAIDs.    Continue to monitor labs.    Orders:    Comprehensive metabolic panel; Future    Impaired fasting glucose  Recommended to avoid starchy foods, concentrated sweets, refined sugars.    Orders:    Comprehensive metabolic panel; Future    Class 1 obesity due to excess calories with body mass index (BMI) of 34.0 to 34.9 in adult, unspecified whether serious comorbidity present  Recommended to work on dietary modifications, continue regular exercise.         Elevated PSA measurement  Patient canceled MRI fusion prostate biopsy in July.    He will call urology to schedule procedure in January 2025 after returning from trip to  [place].       Medicare annual wellness visit, subsequent            Preventive health issues were discussed with patient, and age appropriate screening tests were ordered as noted in patient's After Visit Summary. Personalized health advice and appropriate referrals for health education or preventive services given if needed, as noted in patient's After Visit Summary.    Schedule follow-up visit in 6 months.  Check labs prior to next visit.    History of Present Illness     HPI     Patient presents for 6 month follow-up visit, Medicare AWV.    PMHx: CAD, severe AS, S/P AVR, CABG x 1 in 3/2023,   Dyslipidemia, Obesity, IFG, CKD 3,  elevated PSA, ED.    Patient states that she he feels well, travels a lot with his wife, walks for 2 hr. daily.  He is planning on leaving to [place] for 3 weeks and will be back home in January.      Blood work done on 11/26/24.  Cholesterol 173, HDL 48, , triglycerides 48,  Hb 14.8, fasting blood sugar 122, creatinine 1.23, GFR 58, LFTs - within normal range, potassium 4.1.  PSA 11.457 ( was 7.674)    Patient stopped taking all his medications  due to side effects.  He takes aspirin 81 mg 2 times a week, MVI, Vit D 1000 IU a few tomes per week.    CAD/ S/P AVR - followed by cardiology Dr. Lagunas, was seen by cardiology CRNP in October 2024.    Denies chest pain, SOB, dizziness.  Patient stopped taking statin, beta-blocker due to side effects.    He was feeling tired, sleepy, noticed low heart rates, pain in joints. Feels great now.      Denies tobacco, alcohol use.    Colonoscopy performed by colorectal surgeon Dr. Camarillo in June 2022.    No further screening colonoscopy recommended.    Denies family history of colon cancer, prostate cancer.    Declined vaccinations.    Patient Care Team:  Kyra Vasquez MD as PCP - General    Review of Systems   Constitutional:  Negative for activity change, appetite change, chills, fatigue and fever.   HENT:  Negative for congestion, ear pain, hearing loss and sore throat.    Eyes: Negative.    Respiratory:  Negative for cough and shortness of breath.    Cardiovascular:  Negative for chest pain, palpitations and leg swelling.   Gastrointestinal:  Negative for abdominal pain, blood in stool, constipation, diarrhea, nausea and vomiting.   Genitourinary:  Negative for dysuria and hematuria.   Musculoskeletal:  Negative for arthralgias, back pain, gait problem and joint swelling.   Skin:  Negative for rash.   Neurological:  Negative for dizziness, syncope and headaches.   Hematological: Negative.    Psychiatric/Behavioral:  Negative for dysphoric mood and sleep disturbance. The patient is not nervous/anxious.      Medical History Reviewed by provider this encounter:  Tobacco  Allergies  Meds  Problems  Med Hx  Surg Hx  Fam Hx       Annual Wellness Visit Questionnaire   Massimo is here for his Subsequent Wellness visit.     Health Risk Assessment:   Patient rates overall health as very good. Patient feels that their physical health rating is slightly better. Patient is very satisfied with their life. Eyesight was  rated as same. Hearing was rated as same. Patient feels that their emotional and mental health rating is much better. Patients states they are never, rarely angry. Patient states they are never, rarely unusually tired/fatigued. Pain experienced in the last 7 days has been none. Patient states that he has experienced no weight loss or gain in last 6 months.     Depression Screening:   PHQ-2 Score: 0      Fall Risk Screening:   In the past year, patient has experienced: no history of falling in past year      Home Safety:  Patient does not have trouble with stairs inside or outside of their home. Patient has working smoke alarms and has working carbon monoxide detector. Home safety hazards include: none.     Nutrition:   Current diet is Regular.     Medications:   Patient is not currently taking any over-the-counter supplements. Patient is able to manage medications.     Activities of Daily Living (ADLs)/Instrumental Activities of Daily Living (IADLs):   Walk and transfer into and out of bed and chair?: Yes  Dress and groom yourself?: Yes    Bathe or shower yourself?: Yes    Feed yourself? Yes  Do your laundry/housekeeping?: Yes  Manage your money, pay your bills and track your expenses?: Yes  Make your own meals?: Yes    Do your own shopping?: Yes    Durable Medical Equipment Suppliers  n/a    Previous Hospitalizations:   Any hospitalizations or ED visits within the last 12 months?: No      Advance Care Planning:   Living will: Yes    Durable POA for healthcare: Yes    Advanced directive: Yes    Advanced directive counseling given: Yes    End of Life Decisions reviewed with patient: Yes      Cognitive Screening:   Provider or family/friend/caregiver concerned regarding cognition?: No    PREVENTIVE SCREENINGS      Cardiovascular Screening:    General: Screening Not Indicated and History Lipid Disorder      Diabetes Screening:     General: Screening Current      Colorectal Cancer Screening:     General: Screening  Current      Prostate Cancer Screening:    General: Screening Current      Osteoporosis Screening:    General: Risks and Benefits Discussed      Abdominal Aortic Aneurysm (AAA) Screening:    Risk factors include: age between 65-74 yo        General: Screening Not Indicated and Risks and Benefits Discussed      Lung Cancer Screening:     General: Screening Not Indicated      Hepatitis C Screening:    General: Screening Current    Screening, Brief Intervention, and Referral to Treatment (SBIRT)    Screening  Typical number of drinks in a day: 0  Typical number of drinks in a week: 0  Interpretation: Low risk drinking behavior.    AUDIT-C Screenin) How often did you have a drink containing alcohol in the past year? never  2) How many drinks did you have on a typical day when you were drinking in the past year? 0  3) How often did you have 6 or more drinks on one occasion in the past year? never    AUDIT-C Score: 0  Interpretation: Score 0-3 (male): Negative screen for alcohol misuse    Single Item Drug Screening:  How often have you used an illegal drug (including marijuana) or a prescription medication for non-medical reasons in the past year? never    Single Item Drug Screen Score: 0  Interpretation: Negative screen for possible drug use disorder    Other Counseling Topics:   Car/seat belt/driving safety, skin self-exam and calcium and vitamin D intake and regular weightbearing exercise.     Social Drivers of Health     Financial Resource Strain: Low Risk  (2023)    Overall Financial Resource Strain (CARDIA)     Difficulty of Paying Living Expenses: Not hard at all   Food Insecurity: No Food Insecurity (2024)    Hunger Vital Sign     Worried About Running Out of Food in the Last Year: Never true     Ran Out of Food in the Last Year: Never true   Transportation Needs: No Transportation Needs (2024)    PRAPARE - Transportation     Lack of Transportation (Medical): No     Lack of Transportation  "(Non-Medical): No   Housing Stability: Unknown (11/27/2024)    Housing Stability Vital Sign     Unable to Pay for Housing in the Last Year: No     Homeless in the Last Year: No   Utilities: Not At Risk (11/27/2024)    [facility] Utilities     Threatened with loss of utilities: No     No results found.    Objective     /78 (BP Location: Left arm, Patient Position: Sitting, Cuff Size: Standard)   Pulse 61   Temp 97.6 °F (36.4 °C) (Tympanic)   Resp 18   Ht 5' 6\" (1.676 m)   Wt 97.4 kg (214 lb 12.8 oz)   SpO2 100%   BMI 34.67 kg/m²     Physical Exam  Vitals and nursing note reviewed.   Constitutional:       Appearance: Normal appearance. He is obese.   HENT:      Head: Normocephalic and atraumatic.      Right Ear: Tympanic membrane normal.      Left Ear: Tympanic membrane normal.   Eyes:      Conjunctiva/sclera: Conjunctivae normal.      Pupils: Pupils are equal, round, and reactive to light.   Neck:      Vascular: No carotid bruit.   Cardiovascular:      Rate and Rhythm: Normal rate and regular rhythm.      Heart sounds: No murmur heard.  Pulmonary:      Effort: Pulmonary effort is normal.      Breath sounds: Normal breath sounds.   Abdominal:      General: Bowel sounds are normal. There is no distension.      Palpations: Abdomen is soft.      Tenderness: There is no abdominal tenderness.   Musculoskeletal:         General: No swelling. Normal range of motion.      Cervical back: Normal range of motion and neck supple.      Right lower leg: No edema.      Left lower leg: No edema.   Skin:     General: Skin is warm and dry.      Findings: No rash.   Neurological:      General: No focal deficit present.      Mental Status: He is alert and oriented to person, place, and time.      Motor: No weakness.      Gait: Gait normal.   Psychiatric:         Mood and Affect: Mood normal.         "

## 2024-11-27 NOTE — ASSESSMENT & PLAN NOTE
Refusing taking statin due to experiencing side effects while taking Rosuvastatin.  LDL goal is 70.  Recommended to follow a low-cholesterol diet, regular exercise.    Recheck lipid panel in 6 months.    Orders:    Comprehensive metabolic panel; Future    Lipid panel; Future

## 2024-11-27 NOTE — ASSESSMENT & PLAN NOTE
Recommended to avoid starchy foods, concentrated sweets, refined sugars.    Orders:    Comprehensive metabolic panel; Future

## 2024-11-27 NOTE — ASSESSMENT & PLAN NOTE
Lab Results   Component Value Date    EGFR 58 11/26/2024    EGFR 65 06/27/2024    EGFR 61 05/17/2024    CREATININE 1.23 11/26/2024    CREATININE 1.12 06/27/2024    CREATININE 1.17 05/17/2024     Encouraged patient to stay well-hydrated.  Avoid NSAIDs.    Continue to monitor labs.    Orders:    Comprehensive metabolic panel; Future

## 2024-12-25 PROBLEM — Z00.00 MEDICARE ANNUAL WELLNESS VISIT, SUBSEQUENT: Status: RESOLVED | Noted: 2021-10-25 | Resolved: 2024-12-25

## 2025-05-19 ENCOUNTER — TELEPHONE (OUTPATIENT)
Age: 75
End: 2025-05-19

## 2025-05-19 NOTE — TELEPHONE ENCOUNTER
Massimo is requesting to have a PSA added to his lab work as he is monitoring it; nothing mentioned in last AVS.    Please contact to advise.

## 2025-05-23 ENCOUNTER — TELEPHONE (OUTPATIENT)
Age: 75
End: 2025-05-23

## 2025-05-23 DIAGNOSIS — R97.20 ELEVATED PSA MEASUREMENT: ICD-10-CM

## 2025-05-23 NOTE — ASSESSMENT & PLAN NOTE
Lab Results   Component Value Date    EGFR 59 05/24/2025    EGFR 58 11/26/2024    EGFR 65 06/27/2024    CREATININE 1.21 05/24/2025    CREATININE 1.23 11/26/2024    CREATININE 1.12 06/27/2024   stable

## 2025-05-23 NOTE — TELEPHONE ENCOUNTER
Patient returned call, called office and spoke with Sophia.  She said she tired to call him to let him  know the doctor put lab orders in for him, but kept getting cut off.

## 2025-05-23 NOTE — PROGRESS NOTES
[facility]  General Cardiology Note  Massimo Burnett  1950  [id number]      Referring Provider - No ref. provider found  Chief Complaint   Patient presents with    Follow-up     6 mo f/u; no cardiac complaints.        Assessment & Plan  S/P AVR (aortic valve replacement)  Critical AS and CAD, 3/06/24  AVR #25mm Inspiris, CABG x 1 SALDANA to LAD by Dr Mitchell.   2/06/24 TTE showed Bioprosthetic aortic valve no evidence of regurgitation no significant stenosis  Lifelong use of antibiotics 1 hour prior to dental procedures  S/P CABG x 1  Denies CP   Stopped all his medications due to side effects  Only taking baby aspirin daily  Coronary artery disease involving native coronary artery of native heart without angina pectoris  Denies any angina or anginal equivalent  Refusing statin, refusing PCSK9I  Continue heart healthy diet and regular physical activity  Dyslipidemia  11/26/24 , TG 48, HD 48,   5/24/25 , TG 70, HDL 47,   Adverse reaction from Crestor with heel pain.  Off Crestor  Briefly Educated on PCSK9I. He would not like further interventions and knows the risks  He wants to continue with lifestyle modification. Heart healthy diet   The 10-year ASCVD risk score (Lencho DK, et al., 2019) is: 15.5%    Values used to calculate the score:      Age: 75 years      Clincally relevant sex: Male      Is Non- : No      Diabetic: No      Tobacco smoker: No      Systolic Blood Pressure: 106 mmHg      Is BP treated: No      HDL Cholesterol: 47 mg/dL      Total Cholesterol: 166 mg/dL  Stage 3a chronic kidney disease (HCC)  Lab Results   Component Value Date    EGFR 59 05/24/2025    EGFR 58 11/26/2024    EGFR 65 06/27/2024    CREATININE 1.21 05/24/2025    CREATININE 1.23 11/26/2024    CREATININE 1.12 06/27/2024   stable  Class 1 obesity due to excess calories with body mass index (BMI) of 34.0 to 34.9 in adult, unspecified whether serious comorbidity  present  BMI 34.22 continue with heart healthy diet and regular physical activity  Impaired fasting glucose  HgbA1c 5.6%     Relevant testing  - 2/06/24 LVEF 65% 1 abnormal relaxation right ventricular size systolic function normal.  Left atrium mildly dilated right atrium normal in size.  Bioprosthetic aortic valve no evidence of regurgitation no significant stenosis.  Trace MR, trace TR.      Will RTO in 6 months or sooner if necessary  Patient / Caretaker was advised and educated to call our office  immediately if  patient has any new symptoms of chest pain/shortness of breath, near-syncope, syncope, light headedness sustained palpitations  or any other cardiovascular symptoms before their scheduled follow-up appointment.  ED precautions reviewed    Interval History: 75 y.o.  male  with PMH as below is here for 6 month routine visit  Patient of Dr. Lagunas  Critical AS and CAD, 3/06/24  AVR #25mm Inspiris, CABG x 1 SALDANA to LAD by Dr Mitchell.   Family hx of CAD in his father passing of a HA in his 60's.     Today, he denies any acute symptoms. Compliant with medications   Has cut out sweets. He stays active about 45 min- 1hr at the gym. He was recently in [facility] and walked close to 20,000 steps daily for 10 days     Patient Active Problem List    Diagnosis Date Noted    Pain of right heel 05/21/2024    Stage 3a chronic kidney disease (HCC) 11/18/2023    S/P CABG x 1 03/06/2023    S/P AVR (aortic valve replacement) 03/06/2023    Coronary artery disease involving native coronary artery 02/17/2023    Critical aortic valve stenosis 02/09/2023    Elevated PSA measurement 07/09/2018    Dyslipidemia 07/03/2018    Class 1 obesity due to excess calories in adult 09/02/2015    ED (erectile dysfunction) 07/09/2014    Impaired fasting glucose 08/17/2012     Past Medical History[1]  Social History[2]   Family History[3]  Past Surgical History[4]  Current Medications[5]    Allergies   Allergen Reactions    Eggs Or Egg-Derived  "Products - Food Allergy Hives    Fish-Derived Products - Food Allergy Hives    Tuberculin, Ppd Rash       Vitals:    05/27/25 0832   BP: 106/72   BP Location: Left arm   Patient Position: Sitting   Cuff Size: Large   Pulse: 70   SpO2: 98%   Weight: 96.2 kg (212 lb)   Height: 5' 6\" (1.676 m)        Vitals:    05/27/25 0832   Weight: 96.2 kg (212 lb)      Height: 5' 6\" (167.6 cm)   Body mass index is 34.22 kg/m².    Labs:   Lab Results   Component Value Date/Time    CHOLESTEROL 166 05/24/2025 08:02 AM    TRIG 70 05/24/2025 08:02 AM    TRIG 34 07/08/2014 07:35 AM    HDL 47 05/24/2025 08:02 AM    HDL 55 07/08/2014 07:35 AM    LDLCALC 105 (H) 05/24/2025 08:02 AM    LDLCALC 109 (H) 07/08/2014 07:35 AM      Lab Results   Component Value Date    SODIUM 138 05/24/2025    K 4.4 05/24/2025     05/24/2025    CREATININE 1.21 05/24/2025    EGFR 59 05/24/2025    BUN 21 05/24/2025    CO2 28 05/24/2025    ALT 16 05/24/2025    AST 16 05/24/2025    INR 1.36 (H) 03/06/2023    GLUF 116 (H) 05/24/2025    HGBA1C 5.6 11/26/2024    WBC 6.97 11/26/2024    HGB 14.8 11/26/2024    HCT 45.4 11/26/2024     11/26/2024         Imaging: No results found.    ECG:     Reviewed by MEI Cedeño      Review of Systems   All other systems reviewed and are negative.    Except as noted in HPI, is otherwise reviewed in detail and a 12 point review of systems is negative.    Physical Exam  Vitals reviewed.   Constitutional:       General: He is not in acute distress.     Appearance: Normal appearance. He is not diaphoretic.   HENT:      Head: Normocephalic and atraumatic.     Cardiovascular:      Rate and Rhythm: Normal rate and regular rhythm.      Pulses: Normal pulses.           Radial pulses are 2+ on the right side and 2+ on the left side.      Heart sounds: Normal heart sounds, S1 normal and S2 normal.   Pulmonary:      Effort: Pulmonary effort is normal. No tachypnea or respiratory distress.      Breath sounds: Normal breath sounds. " No wheezing or rales.   Abdominal:      General: Bowel sounds are normal. There is no distension.      Palpations: Abdomen is soft.     Musculoskeletal:      Right lower leg: No edema.      Left lower leg: No edema.     Skin:     General: Skin is warm and dry.      Capillary Refill: Capillary refill takes less than 2 seconds.     Neurological:      Mental Status: He is alert and oriented to person, place, and time.      Gait: Gait normal.     Psychiatric:         Mood and Affect: Mood normal.         Behavior: Behavior normal.          **Please Note: This note may have been constructed using a voice recognition system**     Thank you for the opportunity to participate in the care of this patient.   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    Pneumonia    [2]   Social History  Tobacco Use    Smoking status: Never     Passive exposure: Never    Smokeless tobacco: Never   Vaping Use    Vaping status: Never Used   Substance Use Topics    Alcohol use: No    Drug use: No   [3]   Family History  Problem Relation Name Age of Onset    Diabetes Mother          MELLITUS    Coronary artery disease Mother      Coronary artery disease Father      Stroke Father          SYNDROME   [4]   Past Surgical History:  Procedure Laterality Date    CARDIAC CATHETERIZATION  02/17/2023    Procedure: Cardiac catheterization;  Surgeon: Lorena Alexandre DO;  Location: [facility];  Service: Cardiology    CARDIAC CATHETERIZATION N/A 02/17/2023    Procedure: Cardiac Coronary Angiogram;  Surgeon: Lorena Alexandre DO;  Location: [facility];  Service: Cardiology    COLONOSCOPY  11/2007    COMPLETE; DONE 11/2007 WITH NO POLYPS, +DIVERTICULA. 2/14    COLONOSCOPY  06/30/2022    HI RPLCMT AORTIC VALVE OPN W/STENTLESS  TISSUE VALVE N/A 03/06/2023    Procedure: AVR WITH 25MM INSPIRIS VALVE/ CABG X 1, LIMA to LAD;  Surgeon: Taurus Mitchell DO;  Location: [facility];  Service: Cardiac Surgery    TONSILLECTOMY      LAST ASSESSED: 7/9/14   [5]   Current Outpatient Medications:     aspirin (ECOTRIN LOW STRENGTH) 81 mg EC tablet, Take 1 tablet (81 mg total) by mouth daily, Disp: 90 tablet, Rfl: 3    cholecalciferol (VITAMIN D3) 1,000 units tablet, Take 1,000 Units by mouth once a week, Disp: , Rfl:

## 2025-05-24 ENCOUNTER — APPOINTMENT (OUTPATIENT)
Dept: LAB | Facility: CLINIC | Age: 75
End: 2025-05-24
Attending: FAMILY MEDICINE
Payer: COMMERCIAL

## 2025-05-24 DIAGNOSIS — N18.31 STAGE 3A CHRONIC KIDNEY DISEASE (HCC): ICD-10-CM

## 2025-05-24 DIAGNOSIS — E78.5 DYSLIPIDEMIA: ICD-10-CM

## 2025-05-24 DIAGNOSIS — R73.01 IMPAIRED FASTING GLUCOSE: ICD-10-CM

## 2025-05-24 DIAGNOSIS — I25.10 CORONARY ARTERY DISEASE INVOLVING NATIVE CORONARY ARTERY OF NATIVE HEART WITHOUT ANGINA PECTORIS: ICD-10-CM

## 2025-05-24 DIAGNOSIS — R97.20 ELEVATED PSA MEASUREMENT: ICD-10-CM

## 2025-05-24 LAB
ALBUMIN SERPL BCG-MCNC: 4.2 G/DL (ref 3.5–5)
ALP SERPL-CCNC: 66 U/L (ref 34–104)
ALT SERPL W P-5'-P-CCNC: 16 U/L (ref 7–52)
ANION GAP SERPL CALCULATED.3IONS-SCNC: 5 MMOL/L (ref 4–13)
AST SERPL W P-5'-P-CCNC: 16 U/L (ref 13–39)
BILIRUB SERPL-MCNC: 0.53 MG/DL (ref 0.2–1)
BUN SERPL-MCNC: 21 MG/DL (ref 5–25)
CALCIUM SERPL-MCNC: 9.4 MG/DL (ref 8.4–10.2)
CHLORIDE SERPL-SCNC: 105 MMOL/L (ref 96–108)
CHOLEST SERPL-MCNC: 166 MG/DL (ref ?–200)
CO2 SERPL-SCNC: 28 MMOL/L (ref 21–32)
CREAT SERPL-MCNC: 1.21 MG/DL (ref 0.6–1.3)
GFR SERPL CREATININE-BSD FRML MDRD: 59 ML/MIN/1.73SQ M
GLUCOSE P FAST SERPL-MCNC: 116 MG/DL (ref 65–99)
HDLC SERPL-MCNC: 47 MG/DL
LDLC SERPL CALC-MCNC: 105 MG/DL (ref 0–100)
NONHDLC SERPL-MCNC: 119 MG/DL
POTASSIUM SERPL-SCNC: 4.4 MMOL/L (ref 3.5–5.3)
PROT SERPL-MCNC: 7 G/DL (ref 6.4–8.4)
PSA SERPL-MCNC: 10.55 NG/ML (ref 0–4)
SODIUM SERPL-SCNC: 138 MMOL/L (ref 135–147)
TRIGL SERPL-MCNC: 70 MG/DL (ref ?–150)

## 2025-05-24 PROCEDURE — 84153 ASSAY OF PSA TOTAL: CPT

## 2025-05-24 PROCEDURE — 80053 COMPREHEN METABOLIC PANEL: CPT

## 2025-05-24 PROCEDURE — 36415 COLL VENOUS BLD VENIPUNCTURE: CPT

## 2025-05-24 PROCEDURE — 80061 LIPID PANEL: CPT

## 2025-05-27 ENCOUNTER — OFFICE VISIT (OUTPATIENT)
Dept: CARDIOLOGY CLINIC | Facility: CLINIC | Age: 75
End: 2025-05-27
Payer: COMMERCIAL

## 2025-05-27 VITALS
HEART RATE: 70 BPM | SYSTOLIC BLOOD PRESSURE: 106 MMHG | HEIGHT: 66 IN | BODY MASS INDEX: 34.07 KG/M2 | WEIGHT: 212 LBS | OXYGEN SATURATION: 98 % | DIASTOLIC BLOOD PRESSURE: 72 MMHG

## 2025-05-27 DIAGNOSIS — I25.10 CORONARY ARTERY DISEASE INVOLVING NATIVE CORONARY ARTERY OF NATIVE HEART WITHOUT ANGINA PECTORIS: ICD-10-CM

## 2025-05-27 DIAGNOSIS — R73.01 IMPAIRED FASTING GLUCOSE: ICD-10-CM

## 2025-05-27 DIAGNOSIS — Z95.1 S/P CABG X 1: ICD-10-CM

## 2025-05-27 DIAGNOSIS — E78.5 DYSLIPIDEMIA: ICD-10-CM

## 2025-05-27 DIAGNOSIS — N18.31 STAGE 3A CHRONIC KIDNEY DISEASE (HCC): ICD-10-CM

## 2025-05-27 DIAGNOSIS — E66.811 CLASS 1 OBESITY DUE TO EXCESS CALORIES WITH BODY MASS INDEX (BMI) OF 34.0 TO 34.9 IN ADULT, UNSPECIFIED WHETHER SERIOUS COMORBIDITY PRESENT: ICD-10-CM

## 2025-05-27 DIAGNOSIS — Z95.2 S/P AVR (AORTIC VALVE REPLACEMENT): Primary | ICD-10-CM

## 2025-05-27 DIAGNOSIS — E66.09 CLASS 1 OBESITY DUE TO EXCESS CALORIES WITH BODY MASS INDEX (BMI) OF 34.0 TO 34.9 IN ADULT, UNSPECIFIED WHETHER SERIOUS COMORBIDITY PRESENT: ICD-10-CM

## 2025-05-27 PROCEDURE — 99214 OFFICE O/P EST MOD 30 MIN: CPT

## 2025-05-27 NOTE — ASSESSMENT & PLAN NOTE
Denies any angina or anginal equivalent  Refusing statin, refusing PCSK9I  Continue heart healthy diet and regular physical activity

## 2025-05-27 NOTE — ASSESSMENT & PLAN NOTE
11/26/24 , TG 48, HD 48,   5/24/25 , TG 70, HDL 47,   Adverse reaction from Crestor with heel pain.  Off Crestor  Briefly Educated on PCSK9I. He would not like further interventions and knows the risks  He wants to continue with lifestyle modification. Heart healthy diet   The 10-year ASCVD risk score (Lencho VILLATORO, et al., 2019) is: 15.5%    Values used to calculate the score:      Age: 75 years      Clincally relevant sex: Male      Is Non- : No      Diabetic: No      Tobacco smoker: No      Systolic Blood Pressure: 106 mmHg      Is BP treated: No      HDL Cholesterol: 47 mg/dL      Total Cholesterol: 166 mg/dL

## 2025-05-27 NOTE — ASSESSMENT & PLAN NOTE
Critical AS and CAD, 3/06/24  AVR #25mm Inspiris, CABG x 1 SALDANA to LAD by Dr Mitchell.   2/06/24 TTE showed Bioprosthetic aortic valve no evidence of regurgitation no significant stenosis  Lifelong use of antibiotics 1 hour prior to dental procedures

## 2025-06-03 ENCOUNTER — OFFICE VISIT (OUTPATIENT)
Dept: FAMILY MEDICINE CLINIC | Facility: CLINIC | Age: 75
End: 2025-06-03
Payer: COMMERCIAL

## 2025-06-03 VITALS
SYSTOLIC BLOOD PRESSURE: 138 MMHG | HEART RATE: 70 BPM | WEIGHT: 212.6 LBS | TEMPERATURE: 98 F | OXYGEN SATURATION: 98 % | RESPIRATION RATE: 18 BRPM | DIASTOLIC BLOOD PRESSURE: 86 MMHG | HEIGHT: 66 IN | BODY MASS INDEX: 34.17 KG/M2

## 2025-06-03 DIAGNOSIS — E78.5 DYSLIPIDEMIA: Primary | ICD-10-CM

## 2025-06-03 DIAGNOSIS — R73.01 IMPAIRED FASTING GLUCOSE: ICD-10-CM

## 2025-06-03 DIAGNOSIS — E66.09 CLASS 1 OBESITY DUE TO EXCESS CALORIES WITH BODY MASS INDEX (BMI) OF 34.0 TO 34.9 IN ADULT, UNSPECIFIED WHETHER SERIOUS COMORBIDITY PRESENT: ICD-10-CM

## 2025-06-03 DIAGNOSIS — Z95.1 S/P CABG X 1: ICD-10-CM

## 2025-06-03 DIAGNOSIS — I25.10 CORONARY ARTERY DISEASE INVOLVING NATIVE CORONARY ARTERY OF NATIVE HEART WITHOUT ANGINA PECTORIS: ICD-10-CM

## 2025-06-03 DIAGNOSIS — R97.20 ELEVATED PSA MEASUREMENT: ICD-10-CM

## 2025-06-03 DIAGNOSIS — N18.31 STAGE 3A CHRONIC KIDNEY DISEASE (HCC): ICD-10-CM

## 2025-06-03 DIAGNOSIS — E66.811 CLASS 1 OBESITY DUE TO EXCESS CALORIES WITH BODY MASS INDEX (BMI) OF 34.0 TO 34.9 IN ADULT, UNSPECIFIED WHETHER SERIOUS COMORBIDITY PRESENT: ICD-10-CM

## 2025-06-03 DIAGNOSIS — Z95.2 S/P AVR (AORTIC VALVE REPLACEMENT): ICD-10-CM

## 2025-06-03 PROBLEM — I35.0 CRITICAL AORTIC VALVE STENOSIS: Status: RESOLVED | Noted: 2023-02-09 | Resolved: 2025-06-03

## 2025-06-03 PROBLEM — M79.671 PAIN OF RIGHT HEEL: Status: RESOLVED | Noted: 2024-05-21 | Resolved: 2025-06-03

## 2025-06-03 PROCEDURE — G2211 COMPLEX E/M VISIT ADD ON: HCPCS | Performed by: NURSE PRACTITIONER

## 2025-06-03 PROCEDURE — 99214 OFFICE O/P EST MOD 30 MIN: CPT | Performed by: NURSE PRACTITIONER

## 2025-06-03 NOTE — ASSESSMENT & PLAN NOTE
Patient's BMI is stable at 34.  Patient states he recently lost approximately 8 pounds.  He is watching his diet.  He is active.

## 2025-06-03 NOTE — PROGRESS NOTES
Name: Massimo Burnett      : 1950      MRN: [id number]  Encounter Provider: MEI Dorado  Encounter Date: 6/3/2025   Encounter department: [facility]  :  Assessment & Plan  Dyslipidemia    Recent lipid panel reviewed with the patient.  He had been on Crestor for risk reduction however this was discontinued due to muscle pain.  Cardiology did speak with him regarding PCSK91 medications however the patient declined.  He is aware of the risks of not taking any the but medications.  He continues with lifestyle modifications.  Recently lost 8 pounds.    Component      Latest Ref Rng 2025   Cholesterol      See Comment mg/dL 166    Triglycerides      See Comment mg/dL 70    HDL      >=40 mg/dL 47    LDL Calculated      0 - 100 mg/dL 105 (H)    Non-HDL Cholesterol      mg/dl 119       Legend:  (H) High    Orders:    Lipid panel; Future    Comprehensive metabolic panel; Future  The 10-year ASCVD risk score (Lencho VILLATORO, et al., 2019) is: 23.5%    Values used to calculate the score:      Age: 75 years      Clincally relevant sex: Male      Is Non- : No      Diabetic: No      Tobacco smoker: No      Systolic Blood Pressure: 138 mmHg      Is BP treated: No      HDL Cholesterol: 47 mg/dL      Total Cholesterol: 166 mg/dL    Class 1 obesity due to excess calories with body mass index (BMI) of 34.0 to 34.9 in adult, unspecified whether serious comorbidity present  Patient's BMI is stable at 34.  Patient states he recently lost approximately 8 pounds.  He is watching his diet.  He is active.       S/P CABG x 1  Patient with a history of CABG.  He continues with baby aspirin.  He did stop his metoprolol and Crestor due to side effects.  He denies any chest pain or leg swelling.       S/P AVR (aortic valve replacement)  Patient with a aortic valve replacement in 2024.  Continues with antibiotics prior to dental procedures.       Elevated PSA measurement  Lab Results    Component Value Date    PSA 10.554 (H) 05/24/2025    PSA 11.457 (H) 11/26/2024    PSA 7.674 (H) 07/01/2024     Patient's PSA continues to be elevated.  He was scheduled for an MRI guided biopsy last year however decided to cancel this appointment.  He is asymptomatic at this time.  He does not wish to follow with urology.  Orders:    PSA Total, Diagnostic; Future    Stage 3a chronic kidney disease (HCC)  Lab Results   Component Value Date    EGFR 59 05/24/2025    EGFR 58 11/26/2024    EGFR 65 06/27/2024    CREATININE 1.21 05/24/2025    CREATININE 1.23 11/26/2024    CREATININE 1.12 06/27/2024     Patient aware of chronic kidney disease diagnosis.  Nephrotoxins discussed.  Orders:    Comprehensive metabolic panel; Future    CBC and differential; Future    Impaired fasting glucose  Lab Results   Component Value Date    HGBA1C 5.6 11/26/2024     Last A1c 5.6.  Continue to watch carbs in diet.  Orders:    Comprehensive metabolic panel; Future    Coronary artery disease involving native coronary artery of native heart without angina pectoris  Patient asymptomatic at this time.  Refusing statin or any other like medications.  Continue heart healthy diet.  Continue exercise.  Orders:    Comprehensive metabolic panel; Future          Depression Screening and Follow-up Plan: Patient was screened for depression during today's encounter. They screened negative with a PHQ-2 score of 0.        History of Present Illness   Patient presents to the office today for follow-up.  Overall he feels well.  He recently lost 8 pounds.  Recent blood work reviewed with the patient.  Declines COVID and flu vaccines.  Up-to-date with tetanus booster.  Continues to follow with cardiology.      Review of Systems   Constitutional: Negative.  Negative for fatigue.   HENT: Negative.  Negative for congestion, postnasal drip, rhinorrhea and trouble swallowing.    Eyes: Negative.  Negative for visual disturbance.   Respiratory: Negative.  Negative  "for choking and shortness of breath.    Cardiovascular: Negative.  Negative for chest pain.   Gastrointestinal: Negative.    Endocrine: Negative.    Genitourinary: Negative.    Musculoskeletal: Negative.  Negative for arthralgias, back pain, myalgias and neck pain.   Skin: Negative.    Neurological:  Negative for dizziness and headaches.   Psychiatric/Behavioral: Negative.         Objective   /86   Pulse 70   Temp 98 °F (36.7 °C) (Tympanic)   Resp 18   Ht 5' 6\" (1.676 m)   Wt 96.4 kg (212 lb 9.6 oz)   SpO2 98%   BMI 34.31 kg/m²      Physical Exam  Vitals reviewed.   Constitutional:       Appearance: Normal appearance.   HENT:      Head: Normocephalic and atraumatic.      Nose: Nose normal.      Mouth/Throat:      Mouth: Mucous membranes are moist.     Eyes:      Extraocular Movements: Extraocular movements intact.      Pupils: Pupils are equal, round, and reactive to light.       Cardiovascular:      Rate and Rhythm: Normal rate and regular rhythm.      Pulses: Normal pulses.      Heart sounds: Normal heart sounds.   Pulmonary:      Effort: Pulmonary effort is normal.      Breath sounds: Normal breath sounds.     Musculoskeletal:         General: Normal range of motion.     Skin:     General: Skin is warm.      Capillary Refill: Capillary refill takes less than 2 seconds.     Neurological:      General: No focal deficit present.      Mental Status: He is alert and oriented to person, place, and time. Mental status is at baseline.     Psychiatric:         Mood and Affect: Mood normal.         Behavior: Behavior normal.         Thought Content: Thought content normal.         Judgment: Judgment normal.     BMI Counseling: Body mass index is 34.31 kg/m². The BMI is above normal. Nutrition recommendations include reducing portion sizes, decreasing overall calorie intake, 3-5 servings of fruits/vegetables daily, reducing fast food intake, consuming healthier snacks, decreasing soda and/or juice intake, and " moderation in carbohydrate intake. Exercise recommendations include moderate aerobic physical activity for 150 minutes/week and exercising 3-5 times per week.

## 2025-06-03 NOTE — ASSESSMENT & PLAN NOTE
Lab Results   Component Value Date    PSA 10.554 (H) 05/24/2025    PSA 11.457 (H) 11/26/2024    PSA 7.674 (H) 07/01/2024     Patient's PSA continues to be elevated.  He was scheduled for an MRI guided biopsy last year however decided to cancel this appointment.  He is asymptomatic at this time.  He does not wish to follow with urology.  Orders:    PSA Total, Diagnostic; Future

## 2025-06-03 NOTE — PATIENT INSTRUCTIONS
"Patient Education     DASH diet   The Basics   Written by the doctors and editors at [facility]   What is the DASH diet? -- DASH stands for \"dietary approaches to stop hypertension.\" It is an eating plan that can help lower blood pressure. It can also help prevent high blood pressure, which doctors call \"hypertension.\" You don't need special foods or recipes to follow the DASH diet. It is more about eating certain types of foods in certain amounts.  The DASH diet has lots of fruits and vegetables, whole grains, lean meats, healthy fats, and low-fat or fat-free dairy products (figure 1). It is low in saturated fats, trans fats, cholesterol, added sugars, and sodium (salt).  The standard DASH diet limits sodium to no more than 2300 mg a day. Your doctor or nurse can talk to you about what your specific goals should be.  Why do I need the DASH diet? -- The DASH diet can help you:   Lower your blood pressure and cholesterol   Lower your risk for cancer, heart disease, heart attack, and stroke. It might also lower your risk for heart failure, kidney stones, and diabetes.   Lose weight or keep a healthy weight  What can I eat and drink on the DASH diet? -- Below are some guidelines and examples for your daily and weekly nutrition goals. These are based on a 2000-calorie-per-day eating plan.  Daily goals:   Grains - Try to eat 6 to 8 servings of whole-grain, high-fiber foods each day. Examples of a serving include 1 slice of bread, 1 ounce (30 grams) of dry cereal, or 1/2 cup (120 grams) of cooked cereal, pasta, or brown rice.   Fruits - Try to eat 4 to 5 servings of fruit each day. Examples of a serving include 1 medium fruit or 1/2 cup (75 grams) of fresh, frozen, or canned fruit. Try to eat different kinds and colors. Frozen or canned fruit should not have added sugar. Look for frozen or canned fruits with 100 percent fruit juice or water.   Vegetables - Try to eat 4 to 5 servings of vegetables each day. Examples of a " "serving include 1 cup (40 grams) of leafy greens or 1/2 cup (75 grams) of fresh or cooked vegetables. Try to pick many kinds and colors. If you buy canned vegetables, look for \"low sodium\" or \"salt free.\" Buy plain, frozen vegetables to avoid added fat and sodium.   Dairy - Try to eat 2 to 3 servings of fat-free or low-fat milk products each day. Examples of a serving include 1 cup (240 mL) of milk or yogurt or 1.5 ounces (45 grams) of cheese.   Lean meats, poultry, and seafood - Try to eat 6 or fewer servings of lean meat, poultry, and seafood each day. Examples of a serving include 1 egg or 1 ounce (30 grams) of cooked meat, poultry, or fish. Try to choose more low-fat or lean meats like chicken, fish, or turkey. Eat less red meat.   Fats and oils - Try to eat 2 to 3 servings of fats and oils each day. Examples of a serving include 1 teaspoon (5 mL) of soft margarine or vegetable oil, or 1 tablespoon (18 grams) of mayonnaise. Eat healthy fats like those found in fish, nuts, and avocados. Try using olive oil or vegetable oils such as canola oil. You can also try corn, safflower, sunflower, or soybean oils. Use low-sodium and low-fat salad dressing and mayonnaise.  Weekly goals:   Nuts, seeds, and legumes (dry beans and peas) - Try to eat 4 to 5 servings each week. Examples of a serving include 1/3 cup (45 grams) of nuts, 2 tablespoons (50 grams) of nut butter or seeds, or 1/2 cup (75 grams) of cooked legumes. Try almonds and walnuts, sunflower seeds, peanut or other nut butters, soybeans, lentils, kidney beans, and split peas.   Sweets - Try to eat fewer than 5 servings each week. Examples of a serving include 1 tablespoon (14 grams) of sugar or jelly, or 1/2 cup (120 grams) of gelatin. Choose low-fat and trans fat-free desserts. These include fruit-flavored gelatin, sorbet, jellybeans, amor crackers, animal crackers, low-fat fig bars, and jayjay snaps. Eat fruit to satisfy the desire for sweets.  To add flavor, " use pepper, herbs, spices, vinegar, or lemon or lime juices. Choose low-sodium or salt-free products whenever you can. This is especially important for foods like broths, soups, or soy sauce.  What foods and drinks should I avoid on the DASH diet?    Grains to avoid - Salted breads, rolls, crackers, quick breads, self-rising flours, biscuit mixes, regular breadcrumbs, instant hot cereals, commercially prepared rice, pasta, stuffing mixes.   Fruits and vegetables to avoid - Store-bought prepared potatoes and vegetable mixes, regular canned vegetables and juices, vegetables frozen with sauce, pickled vegetables, processed fruits with salt or sodium.   Dairy products to avoid - Whole milk, malted milk, chocolate milk, buttermilk, full-fat cheese, ice cream.   Meats to avoid - Smoked, cured, salted, or canned fish such as sardines or anchovies. High-fat cuts of meat like beef, lamb, pork, singletary and sausage, and chicken with the skin on it.   Fats and oils to avoid - Eat fewer solid fats like butter, lard, and hard stick margarine. Eat less saturated fat, trans fat, and total fat.   Condiments and snacks to avoid - Salted and canned peas, beans, and olives. Salted snack foods, fried foods, soda, other sweetened drinks.   Sweets to avoid - High-fat baked goods such as muffins, donuts, pastries, and commercial baked goods. Candy bars.   Alcohol - If you choose to drink alcohol, limit the amount. Most doctors recommend limiting alcohol to no more than 1 drink a day (for females) or 2 drinks a day (for males).  What else do I need to know?    Get regular physical activity to make this diet help you even more. Even gentle forms of activity, like walking, are good for your health.   Try baking or broiling instead of frying foods.   Write down the foods that you eat. This will help you track what you have eaten each week.   When you go to the grocery store, have a list or a meal plan. Don't shop when you are hungry, since this  might lead you to buy more unhealthy foods.   Read food labels with care (figure 2). They show you how much is in a serving. The amount is given as a percentage of the total amount that you need each day. Reading labels helps you make healthy food choices.  All topics are updated as new evidence becomes available and our peer review process is complete.  This topic retrieved from [employer] on: Feb 26, 2024.  Topic 312933 Version 1.0  Release: 32.2.4 - C32.56  © 2024 UpToDate, Inc. and/or its affiliates. All rights reserved.  figure 1: DASH diet     Graphic 766707 Version 1.0  figure 2: Food label     Graphic 207691 Version 1.0  Consumer Information Use and Disclaimer   Disclaimer: This generalized information is a limited summary of diagnosis, treatment, and/or medication information. It is not meant to be comprehensive and should be used as a tool to help the user understand and/or assess potential diagnostic and treatment options. It does NOT include all information about conditions, treatments, medications, side effects, or risks that may apply to a specific patient. It is not intended to be medical advice or a substitute for the medical advice, diagnosis, or treatment of a health care provider based on the health care provider's examination and assessment of a patient's specific and unique circumstances. Patients must speak with a health care provider for complete information about their health, medical questions, and treatment options, including any risks or benefits regarding use of medications. This information does not endorse any treatments or medications as safe, effective, or approved for treating a specific patient. UpToDate, Inc. and its affiliates disclaim any warranty or liability relating to this information or the use thereof.The use of this information is governed by the Terms of Use, available at https://www.w[employer]tersSurefielduwer.com/en/know/clinical-effectiveness-terms. 2024© UpToDate, Inc. and its  "affiliates and/or licensors. All rights reserved.  Copyright   © 2024 UpToDate, Inc. and/or its affiliates. All rights reserved.      Patient Education     Chronic kidney disease   The Basics   Written by the doctors and editors at [employer]   What is chronic kidney disease? -- Chronic kidney disease, or \"CKD,\" is when the kidneys stop working as well as they should. When they are working normally, the kidneys filter blood and remove waste and excess salt and water (figure 1).  In people with CKD, the kidneys slowly lose the ability to filter blood. In time, the kidneys can stop working completely. That is why it is so important to keep CKD from getting worse.  What are the symptoms of CKD? -- At first, CKD causes no symptoms. As the disease gets worse, it can:   Make your feet, ankles, or legs swell (called \"edema\")   Give you high blood pressure   Make you very tired   Damage your bones  Will I need tests? -- Yes. Your doctor will want to see you regularly. You will probably have appointments at least once a year, and you will get regular tests to check your kidneys. These include blood and urine tests.  If your CKD gets worse over time, you will probably need to see a \"nephrologist.\" This is a doctor who takes care of people with kidney disease.  Is there anything I can do to keep my kidneys from getting worse? -- Yes. If you have CKD, you can protect your kidneys if you:   Take all of your prescribed medicines every day, and follow all of your doctor's instructions for how to take them.   Keep your blood sugar in a healthy range, if you have diabetes.   Change your diet, if your doctor or nurse recommends to. They might suggest working with a dietitian (nutrition expert).   Quit smoking, if you smoke.   Lose weight, if you have excess body weight.   Avoid medicines that can harm the kidneys - One example is nonsteroidal antiinflammatory drugs (\"NSAIDs\"). These medicines include ibuprofen (sample brand names: " "Advil, Motrin) and naproxen (sample brand name: Aleve). There are other medicines that people with CKD need to avoid, too. Check with your doctor, nurse, or kidney specialist before starting any new medicines or supplements, even those you can buy without a prescription.  How is CKD treated? -- People in the early stages of CKD can take medicines to keep the disease from getting worse. For example, many people with CKD should take medicines known as \"ACE inhibitors\" or \"angiotensin receptor blockers.\" If your doctor or nurse prescribes these medicines, it is very important that you take them every day as directed. If they cause side effects or cost too much, tell your doctor or nurse. They might have solutions to offer.  What happens if my kidneys stop working completely? -- If your kidneys can no longer filter blood properly, you can choose between 3 different treatments to take over their job. Your choices are:   Kidney transplant - After transplant surgery, the new kidney can do the job of your own kidneys. If you have a kidney transplant, you will need to take medicines for the rest of your life to keep your body from reacting badly to the new kidney. (You only need 1 kidney to live.)   Hemodialysis - This is a procedure in which a dialysis machine takes over the job of the kidneys. The machine pumps blood out of the body, filters it, and returns it to the body. If you choose hemodialysis, you will need to be hooked up to the machine at least 3 times a week for several hours for the rest of your life. Before you start, you will also need to have surgery to prepare a blood vessel for attachment to the machine.   Peritoneal dialysis - This involves piping a special fluid into the belly every day. If you choose peritoneal dialysis, you will need surgery to have a tube implanted in your belly. Then, you will have to learn how to pipe the fluid in and out through that tube.  How do I choose between the different " treatment options? -- You and your doctor will need to work together to find a treatment that's right for you. Kidney transplant surgery is usually the best option for most people. But often, there are no kidneys available for transplant.  Ask your doctor to explain all of your options and how they might work for you. Then, talk openly with them about how you feel about all of the options. You might even decide that you do not want any treatment. That is your choice.  All topics are updated as new evidence becomes available and our peer review process is complete.  This topic retrieved from [employer] on: May 18, 2024.  Topic 98319 Version 34.0  Release: 32.4.3 - C32.137  © 2024 UpToDate, Inc. and/or its affiliates. All rights reserved.  figure 1: Anatomy of the urinary tract     Urine is made by the kidneys. It passes from the kidneys into the bladder through 2 tubes called the ureters. Then, it leaves the bladder through another tube called the urethra.  Graphic 09670 Version 8.0  Consumer Information Use and Disclaimer   Disclaimer: This generalized information is a limited summary of diagnosis, treatment, and/or medication information. It is not meant to be comprehensive and should be used as a tool to help the user understand and/or assess potential diagnostic and treatment options. It does NOT include all information about conditions, treatments, medications, side effects, or risks that may apply to a specific patient. It is not intended to be medical advice or a substitute for the medical advice, diagnosis, or treatment of a health care provider based on the health care provider's examination and assessment of a patient's specific and unique circumstances. Patients must speak with a health care provider for complete information about their health, medical questions, and treatment options, including any risks or benefits regarding use of medications. This information does not endorse any treatments or  medications as safe, effective, or approved for treating a specific patient. UpToDate, Inc. and its affiliates disclaim any warranty or liability relating to this information or the use thereof.The use of this information is governed by the Terms of Use, available at https://www.[employer].com/en/know/clinical-effectiveness-terms. 2024© UpToDate, Inc. and its affiliates and/or licensors. All rights reserved.  Copyright   © 2024 UpToDate, Inc. and/or its affiliates. All rights reserved.

## 2025-06-03 NOTE — ASSESSMENT & PLAN NOTE
Patient with a aortic valve replacement in March 2024.  Continues with antibiotics prior to dental procedures.

## 2025-06-03 NOTE — ASSESSMENT & PLAN NOTE
Recent lipid panel reviewed with the patient.  He had been on Crestor for risk reduction however this was discontinued due to muscle pain.  Cardiology did speak with him regarding PCSK91 medications however the patient declined.  He is aware of the risks of not taking any the but medications.  He continues with lifestyle modifications.  Recently lost 8 pounds.    Component      Latest Ref Rng 5/24/2025   Cholesterol      See Comment mg/dL 166    Triglycerides      See Comment mg/dL 70    HDL      >=40 mg/dL 47    LDL Calculated      0 - 100 mg/dL 105 (H)    Non-HDL Cholesterol      mg/dl 119       Legend:  (H) High    Orders:    Lipid panel; Future    Comprehensive metabolic panel; Future  The 10-year ASCVD risk score (Lencho VILLATORO, et al., 2019) is: 23.5%    Values used to calculate the score:      Age: 75 years      Clincally relevant sex: Male      Is Non- : No      Diabetic: No      Tobacco smoker: No      Systolic Blood Pressure: 138 mmHg      Is BP treated: No      HDL Cholesterol: 47 mg/dL      Total Cholesterol: 166 mg/dL

## 2025-06-03 NOTE — ASSESSMENT & PLAN NOTE
Lab Results   Component Value Date    EGFR 59 05/24/2025    EGFR 58 11/26/2024    EGFR 65 06/27/2024    CREATININE 1.21 05/24/2025    CREATININE 1.23 11/26/2024    CREATININE 1.12 06/27/2024     Patient aware of chronic kidney disease diagnosis.  Nephrotoxins discussed.  Orders:    Comprehensive metabolic panel; Future    CBC and differential; Future

## 2025-06-03 NOTE — ASSESSMENT & PLAN NOTE
Lab Results   Component Value Date    HGBA1C 5.6 11/26/2024     Last A1c 5.6.  Continue to watch carbs in diet.  Orders:    Comprehensive metabolic panel; Future

## 2025-06-03 NOTE — ASSESSMENT & PLAN NOTE
Patient asymptomatic at this time.  Refusing statin or any other like medications.  Continue heart healthy diet.  Continue exercise.  Orders:    Comprehensive metabolic panel; Future

## 2025-06-03 NOTE — ASSESSMENT & PLAN NOTE
Patient with a history of CABG.  He continues with baby aspirin.  He did stop his metoprolol and Crestor due to side effects.  He denies any chest pain or leg swelling.

## 2025-07-22 ENCOUNTER — TELEPHONE (OUTPATIENT)
Dept: FAMILY MEDICINE CLINIC | Facility: CLINIC | Age: 75
End: 2025-07-22

## 2025-07-22 DIAGNOSIS — Z95.2 S/P AVR (AORTIC VALVE REPLACEMENT): Primary | ICD-10-CM

## 2025-07-22 RX ORDER — AMOXICILLIN 500 MG/1
CAPSULE ORAL
Qty: 4 CAPSULE | Refills: 0 | Status: SHIPPED | OUTPATIENT
Start: 2025-07-22 | End: 2025-07-22

## (undated) DEVICE — BONE WAX WHITE: Brand: BONE WAX WHITE

## (undated) DEVICE — SUCTION CATH 18 FR

## (undated) DEVICE — RADIFOCUS OPTITORQUE ANGIOGRAPHIC CATHETER: Brand: OPTITORQUE

## (undated) DEVICE — ADHESIVE SKIN HIGH VISCOSITY EXOFIN 1ML

## (undated) DEVICE — SPONGE 4 X 4 XRAY 16 PLY STRL LF RFD

## (undated) DEVICE — RED RUBBER URETHRAL CATHETER: Brand: DOVER

## (undated) DEVICE — CATH CVP 5FR SINGLE LUMEN SAFETY TRAY

## (undated) DEVICE — TRAY FOLEY 16FR SURESTEP TEMP SENS URIMETER STAT LOK

## (undated) DEVICE — GAUZE SPONGES,16 PLY: Brand: CURITY

## (undated) DEVICE — PACK CABG PBDS

## (undated) DEVICE — RECIP.STERNUM SAW BLADE 34/7.5/0.7MM: Brand: AESCULAP

## (undated) DEVICE — CATH DIAG 5FR IMPULSE 100CM FL4

## (undated) DEVICE — 40601 PROLONGED POSITIONING SYSTEM: Brand: 40601 PROLONGED POSITIONING SYSTEM

## (undated) DEVICE — BLADE BEAVER MINI SZ 69

## (undated) DEVICE — 32 FR STRAIGHT – SOFT PVC CATHETER: Brand: PVC THORACIC CATHETERS

## (undated) DEVICE — ANTIBACTERIAL UNDYED BRAIDED (POLYGLACTIN 910), SYNTHETIC ABSORBABLE SUTURE: Brand: COATED VICRYL

## (undated) DEVICE — PENCIL ELECTROSURG E-Z CLEAN -0035H

## (undated) DEVICE — CATH DIAG 5FR IMPULSE 100CM FL3.5

## (undated) DEVICE — HEMOCLIP CARTRIDGE LRG

## (undated) DEVICE — SURGICEL NU-KNIT 3 X 4

## (undated) DEVICE — LIGHT HANDLE COVER SLEEVE DISP BLUE STELLAR

## (undated) DEVICE — PLEDGET CARDIO PTFE 9.5 X 4.8 SOFT LF (6EA/PK)

## (undated) DEVICE — THERMOFLECT BLANKET, L, 25EA                               TS THERMOFLECT BLANKET, 48" X 84", SILVER, 5/BG, 5 BG/CS NW: Brand: THERMOFLECT

## (undated) DEVICE — INTENDED FOR TISSUE SEPARATION, AND OTHER PROCEDURES THAT REQUIRE A SHARP SURGICAL BLADE TO PUNCTURE OR CUT.: Brand: BARD-PARKER ® CARBON RIB-BACK BLADES

## (undated) DEVICE — GLOVE SRG BIOGEL ECLIPSE 7

## (undated) DEVICE — FILTER SMOKE EVAC VIROSAFE

## (undated) DEVICE — PLUMEPEN PRO 10FT

## (undated) DEVICE — 3000CC GUARDIAN II: Brand: GUARDIAN

## (undated) DEVICE — SUT ETHIBOND 2-0 V-5/V-5 30 IN PXX52

## (undated) DEVICE — GLOVE INDICATOR PI UNDERGLOVE SZ 7 BLUE

## (undated) DEVICE — STERNAL WIRE

## (undated) DEVICE — SUT SILK 2 60 IN SA8H

## (undated) DEVICE — SUTURE GUIDE

## (undated) DEVICE — TUBING INSUFFLATION SET ISO CONNECTOR

## (undated) DEVICE — OASIS DRAIN, SINGLE, INLINE & ATS COMPATIBLE: Brand: OASIS

## (undated) DEVICE — DGW .035 FC J3MM 260CM TEF: Brand: EMERALD

## (undated) DEVICE — SUT PROLENE 4-0 SH 36 IN 8521H

## (undated) DEVICE — ALCON OPHTHALMIC KNIFE 15 °: Brand: ALCON

## (undated) DEVICE — EVERGRIP INSERT SET 61MM: Brand: FOGARTY EVERGRIP

## (undated) DEVICE — JP PERF DRN SIL FLT 10MM FULL: Brand: CARDINAL HEALTH

## (undated) DEVICE — ELECTRODE BLADE E-Z CLEAN 4IN -0014A

## (undated) DEVICE — SUT SILK 3-0 SH CR/8 18 IN C013D

## (undated) DEVICE — PACK CUSTOM PERFUSION PLEG PK

## (undated) DEVICE — CATH GUIDE LAUNCHER 5FR EBU 3.5

## (undated) DEVICE — INTENDED FOR TISSUE SEPARATION, AND OTHER PROCEDURES THAT REQUIRE A SHARP SURGICAL BLADE TO PUNCTURE OR CUT.: Brand: BARD-PARKER SAFETY BLADES SIZE 15, STERILE

## (undated) DEVICE — SURGICEL 4 X 8

## (undated) DEVICE — DRESSING ALLEVYN LIFE HEEL 25 X 25.2CM

## (undated) DEVICE — EVERGRIP INSERT SET 86MM: Brand: FOGARTY EVERGRIP

## (undated) DEVICE — MICROPUNCTURE 401

## (undated) DEVICE — SUT PDS PLUS 1 CTB 36 IN PDPB359T

## (undated) DEVICE — SILVER-COATED ANTIBACTERIAL BARRIER DRESSING: Brand: ACTICOAT SURGIC 10X25CM 5PK US

## (undated) DEVICE — LIGACLIP MCA MULTIPLE CLIP APPLIERS, 20 SMALL CLIPS: Brand: LIGACLIP

## (undated) DEVICE — TR BAND RADIAL ARTERY COMPRESSION DEVICE: Brand: TR BAND

## (undated) DEVICE — 32 FR RIGHT ANGLE – SOFT PVC CATHETER: Brand: PVC THORACIC CATHETERS

## (undated) DEVICE — SUT SILK 0 CT-1 30 IN 424H

## (undated) DEVICE — SUT PROLENE 6-0 C-1/C-1 30 IN 8307H

## (undated) DEVICE — SILVER-COATED ANTIBACTERIAL BARRIER DRESSING: Brand: ACTICOAT SURGIC 10X12CM 5PK US

## (undated) DEVICE — GLIDESHEATH BASIC HYDROPHILIC COATED INTRODUCER SHEATH: Brand: GLIDESHEATH

## (undated) DEVICE — SUT PROLENE 7-0 BV175-8/BV175-8 24 IN EPM8747

## (undated) DEVICE — SUT VICRYL 2 TP-1 27 IN J849G

## (undated) DEVICE — BULB SYRINGE,IRRIGATION WITH PROTECTIVE CAP: Brand: DOVER

## (undated) DEVICE — SUT ETHIBOND 2-0 SH-1/SH-1 30 IN X763H

## (undated) DEVICE — BLANKET HYPOTHERMIA ADULT GAYMAR

## (undated) DEVICE — PACK CUSTOM PERFUSION AV LOOP

## (undated) DEVICE — HEMOSTATIC MATRIX SURGIFLO 8ML W/THROMBIN

## (undated) DEVICE — SUT PROLENE 7-0 BV-1/BV-1 24 IN 8304H

## (undated) DEVICE — SUT PROLENE 4-0 BB 36 IN 8581H

## (undated) DEVICE — PUMP TUBING FUSION PACK

## (undated) DEVICE — UMBILICAL TAPE: Brand: DEROYAL

## (undated) DEVICE — GUIDEWIRE WHOLEY HI TORQUE INTERM MOD J .035 145CM

## (undated) DEVICE — SYRINGE 50ML LL